# Patient Record
Sex: MALE | Race: WHITE | Employment: OTHER | URBAN - METROPOLITAN AREA
[De-identification: names, ages, dates, MRNs, and addresses within clinical notes are randomized per-mention and may not be internally consistent; named-entity substitution may affect disease eponyms.]

---

## 2017-01-24 ENCOUNTER — CARE COORDINATION (OUTPATIENT)
Dept: GERIATRIC MEDICINE | Facility: CLINIC | Age: 77
End: 2017-01-24

## 2017-01-24 NOTE — PROGRESS NOTES
1/24/2017  CM received notification from member's wife, aMttie, that she would like respite stay from Mid May until Mid June 2017.  Mattie stated she would like to arrange care at [facility] this year as she felt member received excellent care there.    CM informed Mattie that CM will contact [facility] this spring to arrange.    Jossy Cortes RN  [facility]   [phone]

## 2017-01-31 DIAGNOSIS — Z79.4 TYPE 2 DIABETES MELLITUS WITH DIABETIC NEUROPATHY, WITH LONG-TERM CURRENT USE OF INSULIN (H): Primary | ICD-10-CM

## 2017-01-31 DIAGNOSIS — E11.40 TYPE 2 DIABETES MELLITUS WITH DIABETIC NEUROPATHY, WITH LONG-TERM CURRENT USE OF INSULIN (H): Primary | ICD-10-CM

## 2017-01-31 NOTE — TELEPHONE ENCOUNTER
Recent Labs   Lab Test  03/23/16   0837  10/13/14   1335   08/09/12   0619  02/26/12   0608   CHOL  149   --    --   162  183   HDL  43   --    --   33*  39*   LDL  68  112   < >  90  111   TRIG  188*   --    --   195*  170*   CHOLHDLRATIO   --    --    --   5.0  5.0    < > = values in this interval not displayed.     AST        9   10/26/2016  ALT       16   10/26/2016  A1C      5.9   10/26/2016  A1C      6.5   3/23/2016  A1C      6.2   4/16/2015  A1C      6.2   10/13/2014  A1C      6.2   4/10/2014  CREATININE   Date Value Ref Range Status   10/26/2016 0.74 0.66 - 1.25 mg/dL Final   ]  POTASSIUM   Date Value Ref Range Status   10/26/2016 4.2 3.4 - 5.3 mmol/L Final   ]  TSH   Date Value Ref Range Status   10/26/2016 1.70 0.40 - 4.00 mU/L Final   10/13/2014 1.18 0.40 - 4.00 mU/L Final     Comment:     Effective 7/30/2014, the reference range for this assay has changed to reflect   new instrumentation/methodology.     09/24/2013 1.21 0.4 - 5.0 mU/L Final   08/29/2012 1.25 0.4 - 5.0 mU/L Final     BP Readings from Last 4 Encounters:   10/26/16 120/66   08/02/16 113/70   03/23/16 118/62   07/31/15 105/56       Last PHQ-9 score on record= 15

## 2017-02-08 ENCOUNTER — TELEPHONE (OUTPATIENT)
Dept: CARE COORDINATION | Facility: CLINIC | Age: 77
End: 2017-02-08

## 2017-02-08 NOTE — TELEPHONE ENCOUNTER
Ok to continue SN every other week for community wellness, assessments and education     Bettie Cabrera RN  [facility]   [phone]    [facility] now requests orders and shares plan of care/discharge summaries for some patients through [employer].  Please REPLY TO THIS MESSAGE in order to give authorization for orders when needed.  This is considered a verbal order, you will still receive a faxed copy of orders for signature.  Thank you for your assistance in improving collaboration for our patients.

## 2017-02-15 DIAGNOSIS — K59.00 CONSTIPATION, UNSPECIFIED CONSTIPATION TYPE: ICD-10-CM

## 2017-02-15 DIAGNOSIS — Z79.4 TYPE 2 DIABETES MELLITUS WITH COMPLICATION, WITH LONG-TERM CURRENT USE OF INSULIN (H): Primary | ICD-10-CM

## 2017-02-15 DIAGNOSIS — E11.8 TYPE 2 DIABETES MELLITUS WITH COMPLICATION, WITH LONG-TERM CURRENT USE OF INSULIN (H): Primary | ICD-10-CM

## 2017-02-17 NOTE — TELEPHONE ENCOUNTER
Metformin  Routing refill request to provider for review/approval because:  Labs out of range:  Microalbumin  A break in medication, patient should have been out of medication 8 months ago    Colace  Routing refill request to provider for review/approval because:  A break in medication, patient should have been out of medication in December    Maty Reyna RN  [facility]

## 2017-02-17 NOTE — TELEPHONE ENCOUNTER
Metformin         Last Written Prescription Date: 06/03/15  Last Fill Quantity: 60, # refills: 11  Last Office Visit with [facility], [facility] or  Health prescribing provider:  10/26/16        BP Readings from Last 3 Encounters:   10/26/16 120/66   08/02/16 113/70   03/23/16 118/62     Lab Results   Component Value Date    MICROL 60 03/23/2016     Lab Results   Component Value Date    MICROALBUMIN  10/28/2002      Comment:      SEE SCAN     Creatinine   Date Value Ref Range Status   10/26/2016 0.74 0.66 - 1.25 mg/dL Final   ]  GFR Estimate   Date Value Ref Range Status   10/26/2016 >90  Non  GFR Calc   >60 mL/min/1.7m2 Final   03/23/2016 >90  Non  GFR Calc   >60 mL/min/1.7m2 Final   07/31/2015 >90  Non  GFR Calc   >60 mL/min/1.7m2 Final     GFR Estimate If Black   Date Value Ref Range Status   10/26/2016 >90   GFR Calc   >60 mL/min/1.7m2 Final   03/23/2016 >90   GFR Calc   >60 mL/min/1.7m2 Final   07/31/2015 >90   GFR Calc   >60 mL/min/1.7m2 Final     Lab Results   Component Value Date    CHOL 149 03/23/2016     Lab Results   Component Value Date    HDL 43 03/23/2016     Lab Results   Component Value Date    LDL 68 03/23/2016     10/13/2014     Lab Results   Component Value Date    TRIG 188 03/23/2016     Lab Results   Component Value Date    CHOLHDLRATIO 5.0 08/09/2012     Lab Results   Component Value Date    AST 9 10/26/2016     Lab Results   Component Value Date    ALT 16 10/26/2016     Lab Results   Component Value Date    A1C 5.9 10/26/2016    A1C 6.5 03/23/2016    A1C 6.2 04/16/2015    A1C 6.2 10/13/2014    A1C 6.2 04/10/2014     Potassium   Date Value Ref Range Status   10/26/2016 4.2 3.4 - 5.3 mmol/L Final     colace      Last Written Prescription Date: 10/26/16  Last Fill Quantity: 60,  # refills: 1   Last Office Visit with [facility], [facility] or  Health prescribing provider: 10/26/16

## 2017-02-20 RX ORDER — ASPIRIN 81 MG
100 TABLET, DELAYED RELEASE (ENTERIC COATED) ORAL DAILY
Qty: 60 TABLET | Refills: 3 | Status: ON HOLD | OUTPATIENT
Start: 2017-02-20 | End: 2019-01-01

## 2017-02-28 DIAGNOSIS — E11.8 TYPE 2 DIABETES MELLITUS WITH COMPLICATION, WITH LONG-TERM CURRENT USE OF INSULIN (H): Primary | ICD-10-CM

## 2017-02-28 DIAGNOSIS — E11.40 TYPE 2 DIABETES MELLITUS WITH DIABETIC NEUROPATHY, WITH LONG-TERM CURRENT USE OF INSULIN (H): ICD-10-CM

## 2017-02-28 DIAGNOSIS — Z79.4 TYPE 2 DIABETES MELLITUS WITH COMPLICATION, WITH LONG-TERM CURRENT USE OF INSULIN (H): Primary | ICD-10-CM

## 2017-02-28 DIAGNOSIS — Z79.4 TYPE 2 DIABETES MELLITUS WITH DIABETIC NEUROPATHY, WITH LONG-TERM CURRENT USE OF INSULIN (H): ICD-10-CM

## 2017-02-28 RX ORDER — INSULIN GLARGINE 100 [IU]/ML
INJECTION, SOLUTION SUBCUTANEOUS
Qty: 45 ML | Refills: 1 | Status: SHIPPED | OUTPATIENT
Start: 2017-02-28 | End: 2018-01-19

## 2017-02-28 NOTE — TELEPHONE ENCOUNTER
TAE  UNIT/ML        Last Written Prescription Date: 10/26/16  Last Fill Quantity: 3 month, # refills: 1  Last Office Visit with [facility], P or [facility] prescribing provider:  10/26/16        BP Readings from Last 3 Encounters:   10/26/16 120/66   08/02/16 113/70   03/23/16 118/62     Lab Results   Component Value Date    MICROL 60 03/23/2016     Lab Results   Component Value Date    MICROALBUMIN  10/28/2002      Comment:      SEE SCAN     Creatinine   Date Value Ref Range Status   10/26/2016 0.74 0.66 - 1.25 mg/dL Final   ]  GFR Estimate   Date Value Ref Range Status   10/26/2016 >90  Non  GFR Calc   >60 mL/min/1.7m2 Final   03/23/2016 >90  Non  GFR Calc   >60 mL/min/1.7m2 Final   07/31/2015 >90  Non  GFR Calc   >60 mL/min/1.7m2 Final     GFR Estimate If Black   Date Value Ref Range Status   10/26/2016 >90   GFR Calc   >60 mL/min/1.7m2 Final   03/23/2016 >90   GFR Calc   >60 mL/min/1.7m2 Final   07/31/2015 >90   GFR Calc   >60 mL/min/1.7m2 Final     Lab Results   Component Value Date    CHOL 149 03/23/2016     Lab Results   Component Value Date    HDL 43 03/23/2016     Lab Results   Component Value Date    LDL 68 03/23/2016     10/13/2014     Lab Results   Component Value Date    TRIG 188 03/23/2016     Lab Results   Component Value Date    CHOLHDLRATIO 5.0 08/09/2012     Lab Results   Component Value Date    AST 9 10/26/2016     Lab Results   Component Value Date    ALT 16 10/26/2016     Lab Results   Component Value Date    A1C 5.9 10/26/2016    A1C 6.5 03/23/2016    A1C 6.2 04/16/2015    A1C 6.2 10/13/2014    A1C 6.2 04/10/2014     Potassium   Date Value Ref Range Status   10/26/2016 4.2 3.4 - 5.3 mmol/L Final         Recent Labs   Lab Test  03/23/16   0837  10/13/14   1335   08/09/12   0619  02/26/12   0608   CHOL  149   --    --   162  183   HDL  43   --    --   33*  39*   LDL  68  112   < >  90   111   TRIG  188*   --    --   195*  170*   CHOLHDLRATIO   --    --    --   5.0  5.0    < > = values in this interval not displayed.     Lab Results   Component Value Date    AST 9 10/26/2016     Lab Results   Component Value Date    ALT 16 10/26/2016     Lab Results   Component Value Date    A1C 5.9 10/26/2016    A1C 6.5 03/23/2016    A1C 6.2 04/16/2015    A1C 6.2 10/13/2014    A1C 6.2 04/10/2014     Creatinine   Date Value Ref Range Status   10/26/2016 0.74 0.66 - 1.25 mg/dL Final   ]  Potassium   Date Value Ref Range Status   10/26/2016 4.2 3.4 - 5.3 mmol/L Final   ]  TSH   Date Value Ref Range Status   10/26/2016 1.70 0.40 - 4.00 mU/L Final   10/13/2014 1.18 0.40 - 4.00 mU/L Final     Comment:     Effective 7/30/2014, the reference range for this assay has changed to reflect   new instrumentation/methodology.     09/24/2013 1.21 0.4 - 5.0 mU/L Final   08/29/2012 1.25 0.4 - 5.0 mU/L Final     BP Readings from Last 4 Encounters:   10/26/16 120/66   08/02/16 113/70   03/23/16 118/62   07/31/15 105/56       Last PHQ-9 score on record= 15

## 2017-03-17 DIAGNOSIS — I10 HTN, GOAL BELOW 140/90: ICD-10-CM

## 2017-03-20 RX ORDER — FUROSEMIDE 20 MG
TABLET ORAL
Qty: 30 TABLET | Refills: 5 | Status: SHIPPED | OUTPATIENT
Start: 2017-03-20 | End: 2017-10-09

## 2017-03-20 RX ORDER — LISINOPRIL 20 MG/1
TABLET ORAL
Qty: 30 TABLET | Refills: 5 | Status: SHIPPED | OUTPATIENT
Start: 2017-03-20 | End: 2017-10-09

## 2017-03-20 NOTE — TELEPHONE ENCOUNTER
Lasix      Last Written Prescription Date: 3/23/16  Last Fill Quantity: 30, # refills: 11  Last Office Visit with [facility], [facility] or [facility] prescribing provider: 10/26/16       Potassium   Date Value Ref Range Status   10/26/2016 4.2 3.4 - 5.3 mmol/L Final     Creatinine   Date Value Ref Range Status   10/26/2016 0.74 0.66 - 1.25 mg/dL Final     BP Readings from Last 3 Encounters:   10/26/16 120/66   08/02/16 113/70   03/23/16 118/62     Lisinopril      Last Written Prescription Date: 3/23/16  Last Fill Quantity: 30, # refills: 11  Last Office Visit with [facility], [facility] or [facility] prescribing provider: 10/26/16       Potassium   Date Value Ref Range Status   10/26/2016 4.2 3.4 - 5.3 mmol/L Final     Creatinine   Date Value Ref Range Status   10/26/2016 0.74 0.66 - 1.25 mg/dL Final     BP Readings from Last 3 Encounters:   10/26/16 120/66   08/02/16 113/70   03/23/16 118/62

## 2017-03-20 NOTE — TELEPHONE ENCOUNTER
Prescription approved per [facility] Refill Protocol.    Maty Reyna RN  [facility]

## 2017-04-10 ENCOUNTER — TELEPHONE (OUTPATIENT)
Dept: CARE COORDINATION | Facility: CLINIC | Age: 77
End: 2017-04-10

## 2017-04-10 NOTE — TELEPHONE ENCOUNTER
Ok to continue SN every 2 weeks for skilled assesement and education     Bettie Cabrera RN  [facility]   [phone]    [facility] now requests orders and shares plan of care/discharge summaries for some patients through [employer].  Please REPLY TO THIS MESSAGE in order to give authorization for orders when needed.  This is considered a verbal order, you will still receive a faxed copy of orders for signature.  Thank you for your assistance in improving collaboration for our patients.

## 2017-04-16 DIAGNOSIS — M79.662 PAIN OF LEFT LOWER LEG: ICD-10-CM

## 2017-04-16 DIAGNOSIS — E78.5 HYPERLIPIDEMIA LDL GOAL <100: ICD-10-CM

## 2017-04-17 NOTE — TELEPHONE ENCOUNTER
Gabapentin      Last Written Prescription Date:  12/30/16  Last Fill Quantity: 90,   # refills: 0  Last Office Visit with [facility], [facility] or [facility] prescribing provider: 10/26/16  Future Office visit:       Routing refill request to provider for review/approval because:  Drug not on the [facility], [facility] or [facility] refill protocol or controlled substance    Maty Reyna RN  [facility]

## 2017-04-17 NOTE — TELEPHONE ENCOUNTER
Simvastatin     Last Written Prescription Date: 6/3/15  Last Fill Quantity: 30, # refills: 11  Last Office Visit with [facility], P or [facility] prescribing provider: 10/26/16       Lab Results   Component Value Date    CHOL 149 03/23/2016     Lab Results   Component Value Date    HDL 43 03/23/2016     Lab Results   Component Value Date    LDL 68 03/23/2016     Lab Results   Component Value Date    TRIG 188 03/23/2016     Lab Results   Component Value Date    CHOLHDLRATIO 5.0 08/09/2012

## 2017-04-17 NOTE — TELEPHONE ENCOUNTER
Routing refill request to provider for review/approval because:  A break in medication, patient should have been out of medication 10 months ago    Maty Reyna RN  [facility]

## 2017-04-18 ENCOUNTER — CARE COORDINATION (OUTPATIENT)
Dept: GERIATRIC MEDICINE | Facility: CLINIC | Age: 77
End: 2017-04-18

## 2017-04-18 RX ORDER — GABAPENTIN 300 MG/1
CAPSULE ORAL
Qty: 90 CAPSULE | Refills: 0 | Status: SHIPPED | OUTPATIENT
Start: 2017-04-18 | End: 2017-09-09

## 2017-04-18 RX ORDER — SIMVASTATIN 20 MG
TABLET ORAL
Qty: 30 TABLET | Refills: 5 | Status: SHIPPED | OUTPATIENT
Start: 2017-04-18 | End: 2017-05-22

## 2017-04-18 NOTE — PROGRESS NOTES
4/18/2017  CM received a call from members spouse Mattie requesting preparation for members yearly respite stay.    CM confirmed that Mattie wishes to have member go to the [facility].  Mattie confirmed and stated she would like an admit date of May 36th, 27th or 28th.  Respite to be for the full month of June, Returning home the beginning of July.     CM left a VM message for Hanane in admissions at [facility]. [phone])  To review admission process and possibility of openings.  Awaiting return call.    Jossy Cortes RN  [facility]   [phone]       4/18/2017  JENNIFER received a call from Hanane in Admissions.  POC will be for Respite admission to occur on 5/26/2017.  Member will discharge home the first week of July. CM forwarded H&P and Demographics to Hanane today. (Fax: [phone])  (Email: [email or web address].)     Mattie notified of POC.  Mattie to schedule an OV with PCP the week of 5/22/2017.    No additional tasks to be done at this time.    Jossy Cortes RN  [facility]   [phone]

## 2017-05-16 ENCOUNTER — CARE COORDINATION (OUTPATIENT)
Dept: GERIATRIC MEDICINE | Facility: CLINIC | Age: 77
End: 2017-05-16

## 2017-05-16 NOTE — PROGRESS NOTES
5/16/2017  JENNIFER spoke with Hanane Fields and Mattie, members spouse.  Respite stay will begin on Thursday 5/25/2017 with a drop off of 1p.m.    Jossy Cortes RN-[facility]   [phone]

## 2017-05-22 ENCOUNTER — OFFICE VISIT (OUTPATIENT)
Dept: FAMILY MEDICINE | Facility: OTHER | Age: 77
End: 2017-05-22
Payer: COMMERCIAL

## 2017-05-22 VITALS
WEIGHT: 276 LBS | DIASTOLIC BLOOD PRESSURE: 65 MMHG | HEIGHT: 69 IN | RESPIRATION RATE: 24 BRPM | TEMPERATURE: 97 F | HEART RATE: 74 BPM | BODY MASS INDEX: 40.88 KG/M2 | SYSTOLIC BLOOD PRESSURE: 112 MMHG | OXYGEN SATURATION: 95 %

## 2017-05-22 DIAGNOSIS — I10 ESSENTIAL HYPERTENSION WITH GOAL BLOOD PRESSURE LESS THAN 140/90: ICD-10-CM

## 2017-05-22 DIAGNOSIS — F33.0 MAJOR DEPRESSIVE DISORDER, RECURRENT EPISODE, MILD (H): ICD-10-CM

## 2017-05-22 DIAGNOSIS — Z79.4 TYPE 2 DIABETES MELLITUS WITH DIABETIC NEUROPATHY, WITH LONG-TERM CURRENT USE OF INSULIN (H): Primary | ICD-10-CM

## 2017-05-22 DIAGNOSIS — E78.5 HYPERLIPIDEMIA LDL GOAL <100: ICD-10-CM

## 2017-05-22 DIAGNOSIS — E11.40 TYPE 2 DIABETES MELLITUS WITH DIABETIC NEUROPATHY, WITH LONG-TERM CURRENT USE OF INSULIN (H): Primary | ICD-10-CM

## 2017-05-22 DIAGNOSIS — B37.2 YEAST DERMATITIS: ICD-10-CM

## 2017-05-22 LAB
ALBUMIN SERPL-MCNC: 3.4 G/DL (ref 3.4–5)
ALP SERPL-CCNC: 110 U/L (ref 40–150)
ALT SERPL W P-5'-P-CCNC: 12 U/L (ref 0–70)
ANION GAP SERPL CALCULATED.3IONS-SCNC: 10 MMOL/L (ref 3–14)
AST SERPL W P-5'-P-CCNC: 12 U/L (ref 0–45)
BILIRUB SERPL-MCNC: 0.3 MG/DL (ref 0.2–1.3)
BUN SERPL-MCNC: 26 MG/DL (ref 7–30)
CALCIUM SERPL-MCNC: 9.3 MG/DL (ref 8.5–10.1)
CHLORIDE SERPL-SCNC: 105 MMOL/L (ref 94–109)
CHOLEST SERPL-MCNC: 179 MG/DL
CO2 SERPL-SCNC: 28 MMOL/L (ref 20–32)
CREAT SERPL-MCNC: 0.77 MG/DL (ref 0.66–1.25)
GFR SERPL CREATININE-BSD FRML MDRD: ABNORMAL ML/MIN/1.7M2
GLUCOSE SERPL-MCNC: 119 MG/DL (ref 70–99)
HBA1C MFR BLD: 6 % (ref 4.3–6)
HDLC SERPL-MCNC: 39 MG/DL
LDLC SERPL CALC-MCNC: 72 MG/DL
NONHDLC SERPL-MCNC: 140 MG/DL
POTASSIUM SERPL-SCNC: 4.7 MMOL/L (ref 3.4–5.3)
PROT SERPL-MCNC: 7.7 G/DL (ref 6.8–8.8)
SODIUM SERPL-SCNC: 143 MMOL/L (ref 133–144)
TRIGL SERPL-MCNC: 339 MG/DL
TSH SERPL DL<=0.05 MIU/L-ACNC: 1.48 MU/L (ref 0.4–4)

## 2017-05-22 PROCEDURE — 84443 ASSAY THYROID STIM HORMONE: CPT | Performed by: FAMILY MEDICINE

## 2017-05-22 PROCEDURE — 99213 OFFICE O/P EST LOW 20 MIN: CPT | Performed by: FAMILY MEDICINE

## 2017-05-22 PROCEDURE — 83036 HEMOGLOBIN GLYCOSYLATED A1C: CPT | Performed by: FAMILY MEDICINE

## 2017-05-22 PROCEDURE — 36415 COLL VENOUS BLD VENIPUNCTURE: CPT | Performed by: FAMILY MEDICINE

## 2017-05-22 PROCEDURE — 80053 COMPREHEN METABOLIC PANEL: CPT | Performed by: FAMILY MEDICINE

## 2017-05-22 PROCEDURE — 80061 LIPID PANEL: CPT | Performed by: FAMILY MEDICINE

## 2017-05-22 RX ORDER — MICONAZOLE NITRATE
POWDER (GRAM) MISCELLANEOUS DAILY PRN
Qty: 2 BOTTLE | Refills: 1 | Status: ON HOLD | OUTPATIENT
Start: 2017-05-22 | End: 2019-01-01

## 2017-05-22 ASSESSMENT — PAIN SCALES - GENERAL: PAINLEVEL: NO PAIN (0)

## 2017-05-22 NOTE — PROGRESS NOTES
"SUBJECTIVE:                                                    Derek Aragon is a 77 year old male who presents to clinic today for the following health issues:    Chief Complaint   Patient presents with     Consult     Nursing Home Placement on 5/25/17         Amount of exercise or physical activity: PT and OT    Problems taking medications regularly: No    Medication side effects: none    Diet: diabetic      Problem list and histories reviewed & adjusted, as indicated.    C: NEGATIVE for fever, chills, change in weight  E/M: NEGATIVE for ear, mouth and throat problems  R: NEGATIVE for significant cough or SOB  CV: NEGATIVE for chest pain, palpitations or peripheral edema  NEURO: hx CVA    OBJECTIVE:                                                    /65 (BP Location: Right arm, Patient Position: Chair, Cuff Size: Adult Large)  Pulse 74  Temp 97  F (36.1  C) (Temporal)  Resp 24  Ht 5' 9\" (1.753 m)  Wt 276 lb (125.2 kg)  SpO2 95%  BMI 40.76 kg/m2  Body mass index is 40.76 kg/(m^2).    See dictated note     ASSESSMENT/PLAN:                                                    Ok for admit to respite NH care    Salvador Duarte MD, MD  [facility]          "

## 2017-05-22 NOTE — MR AVS SNAPSHOT
After Visit Summary   5/22/2017    Derek Aragon    MRN: [id number]           Patient Information     Date Of Birth          1940        Visit Information        Provider Department      5/22/2017 1:30 PM Salvador Duarte MD [facility]        Today's Diagnoses     Type 2 diabetes mellitus with diabetic neuropathy, with long-term current use of insulin (H)    -  1    Yeast dermatitis        Essential hypertension with goal blood pressure less than 140/90        Major depressive disorder, recurrent episode, mild (H)        Hyperlipidemia LDL goal <100           Follow-ups after your visit        Who to contact     If you have questions or need follow up information about today's clinic visit or your schedule please contact [facility] directly at [phone].  Normal or non-critical lab and imaging results will be communicated to you by [employer], letter or phone within 4 business days after the clinic has received the results. If you do not hear from us within 7 days, please contact the clinic through [employer] or phone. If you have a critical or abnormal lab result, we will notify you by phone as soon as possible.  Submit refill requests through [employer] or call your pharmacy and they will forward the refill request to us. Please allow 3 business days for your refill to be completed.          Additional Information About Your Visit        MyChart Information     [employer] gives you secure access to your electronic health record. If you see a primary care provider, you can also send messages to your care team and make appointments. If you have questions, please call your primary care clinic.  If you do not have a primary care provider, please call [phone] and they will assist you.        Care EveryWhere ID     This is your Care EveryWhere ID. This could be used by other organizations to access your [place] medical records  [id number]        Your Vitals Were      "Pulse Temperature Respirations Height Pulse Oximetry BMI (Body Mass Index)    74 97  F (36.1  C) (Temporal) 24 5' 9\" (1.753 m) 95% 40.76 kg/m2       Blood Pressure from Last 3 Encounters:   05/22/17 112/65   10/26/16 120/66   08/02/16 113/70    Weight from Last 3 Encounters:   05/22/17 276 lb (125.2 kg)   10/26/16 287 lb (130.2 kg)   08/02/16 287 lb (130.2 kg)              We Performed the Following     C FOOT EXAM  NO CHARGE     Comprehensive metabolic panel     DEPRESSION ACTION PLAN (DAP)     EYE EXAM (SIMPLE-NONBILLABLE)     Hemoglobin A1c     Lipid panel reflex to direct LDL     TSH          Today's Medication Changes          These changes are accurate as of: 5/22/17  2:50 PM.  If you have any questions, ask your nurse or doctor.               Start taking these medicines.        Dose/Directions    Miconazole Nitrate Powd powder   Used for:  Yeast dermatitis   Started by:  Salvador Duarte MD        Apply topically daily as needed   Quantity:  2 Bottle   Refills:  1            Where to get your medicines      These medications were sent to [id number] - [place], [place] - [address] [place] [address]  [address] [place] [address]    Hours:  M-F 8:30-6:30; Sat 9-4; closed Sunday Phone:  [phone]     Miconazole Nitrate Powd powder                Primary Care Provider Office Phone # Fax #    Salvador Duarte [phone]       [facility] [address] [facility] [address]        Thank you!     Thank you for choosing [facility]  for your care. Our goal is always to provide you with excellent care. Hearing back from our patients is one way we can continue to improve our services. Please take a few minutes to complete the written survey that you may receive in the mail after your visit with us. Thank you!             Your Updated Medication List - Protect others around you: Learn how to safely use, store and throw away your medicines at www.disposemymeds.org. "          This list is accurate as of: 5/22/17  2:50 PM.  Always use your most recent med list.                   Brand Name Dispense Instructions for use    acyclovir 5 % cream    ZOVIRAX    5 g    Apply topically 5 times daily       albuterol (2.5 MG/3ML) 0.083% neb solution     90 mL    NEBULIZE 1 VIAL BY MOUTH EV NANCY 6 HOURS AS NEEDED FOR W HEEZING / SHORTNESS OF DREW TH /DYSPNEA       ALLERGY RELIEF 10 MG tablet   Generic drug:  loratadine     30 tablet    Take 1 tablet (10 mg) by mouth daily as needed for allergies BRAND NAME IS EQUATE       aspirin 325 MG EC tablet     100 tablet    Take 1 tablet (325 mg) by mouth daily       blood glucose monitoring lancets     3 Box    Use to test blood sugar 4 times daily or as directed.       blood glucose monitoring test strip    no brand specified    100 each    Test 4 times daily.       docusate sodium 100 MG tablet    COLACE    60 tablet    Take 100 mg by mouth daily       DULoxetine 30 MG EC capsule    CYMBALTA    180 capsule    Take 1 capsule (30 mg) by mouth 2 times daily       furosemide 20 MG tablet    LASIX    30 tablet    TAKE 1 TABLET BY MOUTH EVER Y DAY       gabapentin 300 MG capsule    NEURONTIN    90 capsule    TAKE 1 CAPSULE BY MOUTH RHONDA LY AT BEDTIME       hydrochlorothiazide 25 MG tablet    HYDRODIURIL    90 tablet    TAKE 1 TABLET BY MOUTH EVER Y DAY       insulin pen needle 30G X 8 MM    NOVOFINE    100 each    1 Device 2 times daily       LANTUS SOLOSTAR 100 UNIT/ML injection   Generic drug:  insulin glargine     45 mL    15 units every morning and 25 units every HS       lidocaine 5 % Patch    LIDODERM    30 patch    Place 1 patch onto the skin daily as needed To back PRN       lisinopril 20 MG tablet    PRINIVIL/ZESTRIL    30 tablet    TAKE 1 TABLET BY MOUTH EVER Y DAY       metFORMIN 1000 MG tablet    GLUCOPHAGE    60 tablet    TAKE 1 TABLET BY MOUTH TWIC E A DAY WITH MEALS       Miconazole Nitrate Powd powder     2 Bottle    Apply topically daily  as needed       NYSTOP 304947 UNIT/GM Powd   Generic drug:  nystatin     30 g    APPLY TO BUTTOCKS; GROIN; AND PERINEUM 3 TIMES A DAY AS NEEDED.       order for DME      Equipment being ordered: CPAP at previous home settings       rOPINIRole 0.25 MG tablet    REQUIP    90 tablet    Take 1 tablet (0.25 mg) by mouth At Bedtime       simvastatin 20 MG tablet    ZOCOR    30 tablet    TAKE 1 TABLET BY MOUTH EVER Y EVENING

## 2017-05-23 ENCOUNTER — TELEPHONE (OUTPATIENT)
Dept: FAMILY MEDICINE | Facility: OTHER | Age: 77
End: 2017-05-23

## 2017-05-23 ASSESSMENT — PATIENT HEALTH QUESTIONNAIRE - PHQ9: SUM OF ALL RESPONSES TO PHQ QUESTIONS 1-9: 9

## 2017-05-23 NOTE — PROGRESS NOTES
SUBJECTIVE:  Mr. Derek Aragon is a 77-year-old man in accompanied by his wife.      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  He continues to be obese but has had some weight loss recently.  He is in today because he is to be admitted to [facility] for respite for the next month or so as his wife has company coming and would not be able to care for him full-time.  He is really in for clearance exam.  The nursing home has requested a history and physical and sent a paper form.  I do fill that out, scanned it into the chart and faxed it.  He really has not had any change in his situation.  His wife is requesting  occupational therapy and physical therapy and does mention that he will need a 2-person lift to be managed and I put in orders for that on paper.      OBJECTIVE:     GENERAL:  Obese man in a wheelchair.   HEART:  Regular.    LUNGS:  Lungs sound clear.   ABDOMEN:  Obese but soft.     EXTREMITIES:  His left arm is in a sling.  He is weak in both the arm and leg.      MEDICATIONS:  List is included for the nursing home.         SAULO MEJIA M.D.             D: 2017 18:04   T: 2017 09:52   MT: TOLU#[id number]      Name:     DEREK ARAGON   MRN:      [id number]        Account:      [id number]   :      1940           Visit Date:   2017      Document: [id number]

## 2017-05-23 NOTE — TELEPHONE ENCOUNTER
Hanane from admissions at [facility].  Derek will be there for respite stay on Thursday.  They need you to fax the copy of the [place] script.  Fax [phone]  And Phone:  [phone]

## 2017-06-16 ENCOUNTER — CARE COORDINATION (OUTPATIENT)
Dept: GERIATRIC MEDICINE | Facility: CLINIC | Age: 77
End: 2017-06-16

## 2017-06-16 NOTE — PROGRESS NOTES
6/16/2017  CM received a VM message from members spouse Mattie.  Mattie stated that she would be picking up member early from [facility] on June 25th.  CM updated Lakehsia S. Of upcoming d/c date.      Jossy Cortes RN-[facility]   [phone]

## 2017-06-26 ENCOUNTER — CARE COORDINATION (OUTPATIENT)
Dept: GERIATRIC MEDICINE | Facility: CLINIC | Age: 77
End: 2017-06-26

## 2017-06-26 NOTE — PROGRESS NOTES
6/26/2017  Yearly LTCC visit scheduled for July 6th @ [facility]..    Jossy Cortes RN-[facility]   [phone]

## 2017-07-03 DIAGNOSIS — K59.00 CONSTIPATION, UNSPECIFIED CONSTIPATION TYPE: ICD-10-CM

## 2017-07-03 RX ORDER — SENNA AND DOCUSATE SODIUM 50; 8.6 MG/1; MG/1
TABLET, FILM COATED ORAL
Qty: 90 TABLET | Refills: 1 | Status: SHIPPED | OUTPATIENT
Start: 2017-07-03 | End: 2017-12-08

## 2017-07-03 NOTE — TELEPHONE ENCOUNTER
Routing refill request to provider for review/approval because:  Drug not active on patient's medication list    Maty Reyna RN  [facility]

## 2017-07-03 NOTE — TELEPHONE ENCOUNTER
Sennosides-Docusate      Last Written Prescription Date: NA  Last Fill Quantity: NA,  # refills: NA   Last Office Visit with [facility], [facility] or [facility] prescribing provider: 5/22/17

## 2017-07-06 ENCOUNTER — CARE COORDINATION (OUTPATIENT)
Dept: GERIATRIC MEDICINE | Facility: CLINIC | Age: 77
End: 2017-07-06

## 2017-07-07 ENCOUNTER — CARE COORDINATION (OUTPATIENT)
Dept: GERIATRIC MEDICINE | Facility: CLINIC | Age: 77
End: 2017-07-07

## 2017-07-07 ENCOUNTER — TRANSFERRED RECORDS (OUTPATIENT)
Dept: HEALTH INFORMATION MANAGEMENT | Facility: CLINIC | Age: 77
End: 2017-07-07

## 2017-07-10 ENCOUNTER — CARE COORDINATION (OUTPATIENT)
Dept: GERIATRIC MEDICINE | Facility: CLINIC | Age: 77
End: 2017-07-10

## 2017-07-10 NOTE — PROGRESS NOTES
Home visit/Colin Risk Assessment/EW screening completed on: 7/6/2017  Member resides: in a private home with his spouse.   Member currently receiving the following services: Case management, MA Transportation, Home Delivered Meals, 10.5 hrs of PCA Services and Supervision, Lifeline, Incontinent Supplies and SNV 2x Monthly  See EMR for a list of client's diagnoses and medications.   Medication management: Medications reviewed:Yes. Medication management: Care giver administers medication. Medication understanding:Caregiver has understanding of regimen and is able to complete med set up/administration Yes. MTM offered. Declined at this time.   Member Mood/behavior-PHQ9 score: 0/2  Any needs to f/u with PCP on PHQ9 score. No  Plan of Care: All services above will continue except Home Del Meals as spouse states member does not care for them.  PCA hours will increase to 11.5 hours. (Agency Notified of increase.)  Members spouse would also like an equipment review / safety visit to ensure ted lift and bed are working properly.   DTR completed for Home Del Meals and forwarded to Lakeshia KEARNS  CM reviewed Respite stay for 2018 and spouse verbalized understanding that it needs to be scheduled for less than 30 days for each stay.    Follow-Up Plan: Member informed of future contact, plan to f/u with member with a 6 month telephone assessment.  Contact information shared with member and family, encouraged member to call with any questions or concerns prior to this.  See [facility] for further detailed information    Jossy Cortes RN-[facility]   [phone]      7/11/2017  CM received the following notification:    Hello,     The DTR review team has reviewed your request for the member Derek Aragon.    Decision: The reviewer agrees with CC recommendation to terminate services.  Item/Service: Home Delivered Meals.  Amount:   Start date of termination: 07/21/17.      For the current and most up to date forms  please visit the Care Coordinator website at: [email or web address].    A letter will be sent to the member, provider/agency and MD and an authorization/denial has been put into the system so there is no need to send in a referral request form.    Thanks so much!  Sheila Beard, [facility]     No additional f/u needed.    Jossy Cortes RN-[facility]   [phone]     7/11/2017  CM notified [facility] of maintenance call that is needed for ted lift and hospital bed. Awaiting response from [facility] on cost for this service.    Jossy Cortes RN-[facility]   [phone]

## 2017-07-10 NOTE — PROGRESS NOTES
Received a request to submit a DTR for the termination of delivered meals. Documentation completed and faxed to the health plan.  aware.    Lakeshia Winkler RN  Utilization   [facility]  [phone]

## 2017-07-10 NOTE — PROGRESS NOTES
7/6/2017  New Episode to be created for continuation of charting.    Jossy Cortes RN-[facility]   [phone]

## 2017-07-12 ENCOUNTER — CARE COORDINATION (OUTPATIENT)
Dept: GERIATRIC MEDICINE | Facility: CLINIC | Age: 77
End: 2017-07-12

## 2017-07-12 NOTE — PROGRESS NOTES
7/12/2017   received notification from Jhonatan @ [facility] of the following:    Jossy,              We would charge 4 hours of labor.  So the cost would be $224.00.  Let me know if you are OK with this and I ll get order entered.  Thanks,  --Jhonatan    CPS updated with cost and submitted to CMS.      Members wife notified that [facility] will be contacting them.     Jossy Cortes RN-[facility]   [phone]

## 2017-07-13 ENCOUNTER — CARE COORDINATION (OUTPATIENT)
Dept: GERIATRIC MEDICINE | Facility: CLINIC | Age: 77
End: 2017-07-13

## 2017-07-13 NOTE — PROGRESS NOTES
Received after visit chart from care coordinator.  Completed following tasks: Mailed copy of care plan to client  Updated services in access  Submitted referrals/auths for ted lift and electric bed maintenance visit  Entered MMIS  Chart was returned to CC.     [employer]:  Faxed completed PCA assessment and authorization letter to PCA Agency and mailed copies to member.  Faxed authorization letter and MD Communication to PCP.  Emailed referral form for auth to Nathan.      Danelle Gant  Case Management Specialist   [facility] [place]   [phone]

## 2017-07-20 ENCOUNTER — TELEPHONE (OUTPATIENT)
Dept: FAMILY MEDICINE | Facility: OTHER | Age: 77
End: 2017-07-20

## 2017-07-20 ENCOUNTER — CARE COORDINATION (OUTPATIENT)
Dept: GERIATRIC MEDICINE | Facility: CLINIC | Age: 77
End: 2017-07-20

## 2017-07-20 NOTE — TELEPHONE ENCOUNTER
Reason for Call: Request for an order or referral:    Order or referral being requested: new episode of home care to be opened on 7/25/17    Date needed: as soon as possible    Has the patient been seen by the PCP for this problem? YES    Additional comments: ok to leave verbal on her secure cell number    Phone number Patient can be reached at:  Home number on file [phone] (home)    Best Time:  any    Can we leave a detailed message on this number?  YES, Mattie Santiago [phone]    Call taken on 7/20/2017 at 10:28 AM by So Jackman

## 2017-07-20 NOTE — PROGRESS NOTES
7/20/17: Covering CM received notification from [facility] that member's wife, Mattie, had dental coverage questions.   Covering CM placed call to wife, Mattie - she states member sees Dr. Alfonso in [place] and is in process of being fitted for bottom dentures.  Mattie states that if the bottom dentures do not fit properly then Dr. Alfonso mentioned that member would need mini implants. Mattie states they are still about 4 weeks out of knowing for sure but just wanted to find out her options.  Mattie was inquiring on coverage of mini implants.   Explained to her that Dr. Alfonso should be sending in pre treatment auth to [employer] and that he most likely is aware of this.   Implants are not a covered benefit.   Mattie had further questions.  JENNIFER and Mattie made conference call to [facility] - spoke with Kaylah and she verified for Mattie that dental implants are not a covered benefit and no exceptions.       Mattie verbalized understanding and asked if PCA hours could be reduced to use the money to pay for dental implants if needed - JENNIFER explained that this is not an option.  She verbalized understanding.       Mattie asked that JENNIFER Fenton, would f/u further with Mattie re: this.   Covering CM will inform Jossy.     Raina Hawkins RN, PHN  [employer] [facility]

## 2017-07-25 ENCOUNTER — TELEPHONE (OUTPATIENT)
Dept: CARE COORDINATION | Facility: CLINIC | Age: 77
End: 2017-07-25

## 2017-07-25 NOTE — TELEPHONE ENCOUNTER
Ok for SN  Every 2 weeks for cp assess, disease management education     Bettie Cabrera RN  [facility]   [phone]    [facility] utilizes an encounter to take the place of a direct phone call to your office. Please take a moment to review the below request. Your reply to this encounter will act as a verbal OK of orders if requested below. Thank you.

## 2017-08-02 ENCOUNTER — CARE COORDINATION (OUTPATIENT)
Dept: GERIATRIC MEDICINE | Facility: CLINIC | Age: 77
End: 2017-08-02

## 2017-08-02 NOTE — PROGRESS NOTES
8/2/2017  CM received notification from Sena DUPREE, Supervisor, that members spouse had concerns with the restart of SNV from [facility] following his Respite stay in June. Mattie, member's spouse, had reported that no visit was made until she contacted [facility]. Mattie was informed that the Home Care team was not aware that member had returned home from his respite stay. First SNV visit completed was during the week of July 24th.  CM believed she had contacted  Home Care to restart services, CM apologized for the oversight if the call had not been made or if the information was not relayed to the Home Care staff.    Mattie also verbalized that PCA staff were not aware of his return.  CM verbalized concern that I do not schedule the PCA as Mattie has always had direct communication with them as she chooses their schedule and when she wants coverage. CM informed Mattie that I had assumed that she had arranged for PCA coverage prior to his return home since he has PCA daily and that schedule would need to have been arranged or Mattie would not have had any PCA assistance on his 1st day home. CM verbalized apology for the confusion.    CM questioned if [facility] Maintenance visit had been scheduled / completed. Mattie stated she had not received a call from anyone regarding this service. Mattie stated she was not aware of who would be coming out as she had never been updated. CM reviewed that CM had contacted Mattie on 7/12/2017:  See note below       CM received notification from Jhonatan @ [facility] of the following:     Jossy,              We would charge 4 hours of labor.  So the cost would be $224.00.  Let me know if you are OK with this and I ll get order entered.  Thanks,  --[facility]     CPS updated with cost and submitted to CMS.       Members wife notified that [facility] will be contacting them.      Jossy Cortes RN-[facility]   [phone]    Mattie stated she had contacted the [employer] that the items were  delivered by since she was not updated on the POC.   CM encouraged Mattie to look at the Care Plan that was mailed by CMS on 7/13/2017 by CMS. As it had the name / number of provider.  Mattie denied receipt of the Care Plan summary and stated she has not received anything in the mail.  CM printed another copy and placed in 1st class mail today.    JENNIFER contacted [facility] and requested he contact Mattie as soon as possible to schedule the mainenance visit.  [facility] stated it was on their agenda and he would inform individual that was making the visit to call and schedule a time. [facility] stated that it will not be until the 2nd week of August.     CM updated Mattie of the conversation held with [facility].   She will contact  if she does not receive a call.    No additional needs at this time.     Jossy Cortes RN-[facility]   [phone]

## 2017-08-10 DIAGNOSIS — I10 ESSENTIAL HYPERTENSION WITH GOAL BLOOD PRESSURE LESS THAN 140/90: ICD-10-CM

## 2017-08-10 DIAGNOSIS — E78.5 HYPERLIPIDEMIA LDL GOAL <100: ICD-10-CM

## 2017-08-10 DIAGNOSIS — F43.21 ADJUSTMENT DISORDER WITH DEPRESSED MOOD: ICD-10-CM

## 2017-08-10 DIAGNOSIS — E11.40 TYPE 2 DIABETES MELLITUS WITH DIABETIC NEUROPATHY (H): ICD-10-CM

## 2017-08-10 NOTE — TELEPHONE ENCOUNTER
HCTZ  Routing refill request to provider for review/approval because:  A break in medication, patient should have been out of medication >1 month ago    Cymbalta  Routing refill request to provider for review/approval because:  PHQ-9 >4    Maty Reyna RN  [facility]

## 2017-08-10 NOTE — TELEPHONE ENCOUNTER
Aspirin      Last Written Prescription Date: 8/10/16  Last Fill Quantity: 100,  # refills: 3   Last Office Visit with G, P or [facility] prescribing provider: 5/22/17

## 2017-08-10 NOTE — TELEPHONE ENCOUNTER
Hydrochlorothiazide,    Last Written Prescription Date: 12/30/16  Last Fill Quantity: 90, # refills: 1  Last Office Visit with [facility] or [facility] prescribing provider: 5/22/17       Potassium   Date Value Ref Range Status   05/22/2017 4.7 3.4 - 5.3 mmol/L Final     Creatinine   Date Value Ref Range Status   05/22/2017 0.77 0.66 - 1.25 mg/dL Final     BP Readings from Last 3 Encounters:   05/22/17 112/65   10/26/16 120/66   08/02/16 113/70       Cymbalta       Last Written Prescription Date: 12/12/16  Last Fill Quantity: 180, # refills: 1  Last Office Visit with [facility], [facility] or [facility] prescribing provider: 5/22/17        BP Readings from Last 3 Encounters:   05/22/17 112/65   10/26/16 120/66   08/02/16 113/70     Pulse: (for Fetzima)  Creatinine   Date Value Ref Range Status   05/22/2017 0.77 0.66 - 1.25 mg/dL Final   ]    Last PHQ-9 score on record=   PHQ-9 SCORE 5/22/2017   Total Score -   Total Score 9

## 2017-08-10 NOTE — TELEPHONE ENCOUNTER
Prescription approved per [facility] Refill Protocol.    Maty Reyna RN  [facility]

## 2017-08-11 RX ORDER — HYDROCHLOROTHIAZIDE 25 MG/1
TABLET ORAL
Qty: 90 TABLET | Refills: 0 | Status: SHIPPED | OUTPATIENT
Start: 2017-08-11 | End: 2017-11-08

## 2017-08-11 RX ORDER — DULOXETIN HYDROCHLORIDE 30 MG/1
CAPSULE, DELAYED RELEASE ORAL
Qty: 180 CAPSULE | Refills: 0 | Status: SHIPPED | OUTPATIENT
Start: 2017-08-11 | End: 2017-11-08

## 2017-08-16 DIAGNOSIS — Z79.4 TYPE 2 DIABETES MELLITUS WITH COMPLICATION, WITH LONG-TERM CURRENT USE OF INSULIN (H): Primary | ICD-10-CM

## 2017-08-16 DIAGNOSIS — E11.8 TYPE 2 DIABETES MELLITUS WITH COMPLICATION, WITH LONG-TERM CURRENT USE OF INSULIN (H): Primary | ICD-10-CM

## 2017-09-07 ENCOUNTER — CARE COORDINATION (OUTPATIENT)
Dept: GERIATRIC MEDICINE | Facility: CLINIC | Age: 77
End: 2017-09-07

## 2017-09-07 NOTE — PROGRESS NOTES
9/7/2017  JENNIFER received a call from Mattie stating she had received a call from Niyah @ [facility].  There were concerns noted with the his bi-pap machine and Mattie was informed it needed to be fixed.  Niyah was unsure how much would be covered by insurance.   has approx. 10 hours of banked PCA weekly.   also has a small amount unallocated in his budget.  informed Mattie that what is not covered by insurance can be covered under members EW.  Mattie will update Niyah on CM contact information so that she can provide information regarding any uncovered amount and an Auth can be submitted.    CPS and CP to be updated when information is received.     [facility]   [phone]       9/8/2017  JENNIFER received a call from Niyah stating that when they bill insurance for the repair, any left over amount will need to be billed to family. Niyah stated that she would have family update me when / if they receive a bill.    CM to work with Mattie and cover the remaining cost, if any, under members EW.    [facility]   [phone]         9/22/2017  JENNIFER received notification regarding repair of home DME from Jhonatan at [facility]:    Holger Fenton is indicating next week.  --[facility]   [phone]       10/9/2017  JENNIFER contacted Mattie marybeth spouse.  Mattie stated that [facility] did come out and fix the sit to stand lift. A new pump was placed on it and the brakes were fixed.    Mattie informed  that members bi-pap machine is being repaired at this time.  Member has a loaner machine from [facility].  Mattie stated that his should be done this week and she will go return the loaner and  his machine.    No additional concerns or needs at this time.    [facility]   [phone]

## 2017-09-09 DIAGNOSIS — M79.662 PAIN OF LEFT LOWER LEG: ICD-10-CM

## 2017-09-11 RX ORDER — GABAPENTIN 300 MG/1
CAPSULE ORAL
Qty: 90 CAPSULE | Refills: 0 | Status: SHIPPED | OUTPATIENT
Start: 2017-09-11 | End: 2018-01-19

## 2017-09-11 NOTE — TELEPHONE ENCOUNTER
Gabapentin      Last Written Prescription Date:  4/18/17  Last Fill Quantity: 90,   # refills: 0  Last Office Visit with [facility], P or  Health prescribing provider: 5/22/17  Future Office visit:       Routing refill request to provider for review/approval because:  Drug not on the [facility] or [facility] refill protocol or controlled substance

## 2017-09-19 ENCOUNTER — TELEPHONE (OUTPATIENT)
Dept: CARE COORDINATION | Facility: CLINIC | Age: 77
End: 2017-09-19

## 2017-09-19 NOTE — TELEPHONE ENCOUNTER
Ok to continue home care skilled nursing visits every other week for community wellness, disease management education    Bettie Cabrera RN  [facility]   [phone]    [facility] utilizes an encounter to take the place of a direct phone call to your office. Please take a moment to review the below request. Your reply to this encounter will act as a verbal OK of orders if requested below. Thank you.

## 2017-10-09 DIAGNOSIS — Z79.4 TYPE 2 DIABETES MELLITUS WITH COMPLICATION, WITH LONG-TERM CURRENT USE OF INSULIN (H): ICD-10-CM

## 2017-10-09 DIAGNOSIS — E11.8 TYPE 2 DIABETES MELLITUS WITH COMPLICATION, WITH LONG-TERM CURRENT USE OF INSULIN (H): ICD-10-CM

## 2017-10-09 DIAGNOSIS — I10 HTN, GOAL BELOW 140/90: ICD-10-CM

## 2017-10-09 RX ORDER — FUROSEMIDE 20 MG
TABLET ORAL
Qty: 30 TABLET | Refills: 5 | Status: SHIPPED | OUTPATIENT
Start: 2017-10-09 | End: 2018-04-10

## 2017-10-09 RX ORDER — LISINOPRIL 20 MG/1
TABLET ORAL
Qty: 30 TABLET | Refills: 5 | Status: SHIPPED | OUTPATIENT
Start: 2017-10-09 | End: 2018-04-10

## 2017-10-09 NOTE — TELEPHONE ENCOUNTER
Lasix  Routing refill request to provider for review/approval because:  A break in medication, patient should have been out of medication >1 month ago    Lisinopril  Routing refill request to provider for review/approval because:  A break in medication, patient should have been out of medication >1 month ago    Metformin  Routing refill request to provider for review/approval because:  A break in medication, patient should have been out of medication >2 month ago    Maty Reyna RN  [facility]

## 2017-10-09 NOTE — TELEPHONE ENCOUNTER
Lasix      Last Written Prescription Date: 3/20/17  Last Fill Quantity: 30, # refills: 5  Last Office Visit with [facility] or [facility] prescribing provider: 5/22/17       Potassium   Date Value Ref Range Status   05/22/2017 4.7 3.4 - 5.3 mmol/L Final     Creatinine   Date Value Ref Range Status   05/22/2017 0.77 0.66 - 1.25 mg/dL Final     BP Readings from Last 3 Encounters:   05/22/17 112/65   10/26/16 120/66 08/02/16 113/70     Lisinopril      Last Written Prescription Date: 3/20/17  Last Fill Quantity: 30, # refills: 5  Last Office Visit with [facility], [facility] or [facility] prescribing provider: 5/22/17       Potassium   Date Value Ref Range Status   05/22/2017 4.7 3.4 - 5.3 mmol/L Final     Creatinine   Date Value Ref Range Status   05/22/2017 0.77 0.66 - 1.25 mg/dL Final     BP Readings from Last 3 Encounters:   05/22/17 112/65   10/26/16 120/66 08/02/16 113/70     Metformin         Last Written Prescription Date: 2/20/17  Last Fill Quantity: 60, # refills: 5  Last Office Visit with [facility], [facility] or [facility] prescribing provider:  5/22/17        BP Readings from Last 3 Encounters:   05/22/17 112/65   10/26/16 120/66   08/02/16 113/70     Lab Results   Component Value Date    MICROL 60 03/23/2016     Lab Results   Component Value Date    UMALCR 60.93 03/23/2016     Creatinine   Date Value Ref Range Status   05/22/2017 0.77 0.66 - 1.25 mg/dL Final   ]  GFR Estimate   Date Value Ref Range Status   05/22/2017 >90  Non  GFR Calc   >60 mL/min/1.7m2 Final   10/26/2016 >90  Non  GFR Calc   >60 mL/min/1.7m2 Final   03/23/2016 >90  Non  GFR Calc   >60 mL/min/1.7m2 Final     GFR Estimate If Black   Date Value Ref Range Status   05/22/2017 >90   GFR Calc   >60 mL/min/1.7m2 Final   10/26/2016 >90   GFR Calc   >60 mL/min/1.7m2 Final   03/23/2016 >90   GFR Calc   >60 mL/min/1.7m2 Final     Lab Results   Component Value Date    CHOL 179  05/22/2017     Lab Results   Component Value Date    HDL 39 05/22/2017     Lab Results   Component Value Date    LDL 72 05/22/2017     Lab Results   Component Value Date    TRIG 339 05/22/2017     Lab Results   Component Value Date    CHOLHDLRATIO 5.0 08/09/2012     Lab Results   Component Value Date    AST 12 05/22/2017     Lab Results   Component Value Date    ALT 12 05/22/2017     Lab Results   Component Value Date    A1C 6.0 05/22/2017    A1C 5.9 10/26/2016    A1C 6.5 03/23/2016    A1C 6.2 04/16/2015    A1C 6.2 10/13/2014     Potassium   Date Value Ref Range Status   05/22/2017 4.7 3.4 - 5.3 mmol/L Final

## 2017-10-11 ENCOUNTER — ALLIED HEALTH/NURSE VISIT (OUTPATIENT)
Dept: FAMILY MEDICINE | Facility: OTHER | Age: 77
End: 2017-10-11
Payer: COMMERCIAL

## 2017-10-11 DIAGNOSIS — Z23 NEED FOR PROPHYLACTIC VACCINATION AND INOCULATION AGAINST INFLUENZA: Primary | ICD-10-CM

## 2017-10-11 PROCEDURE — 90662 IIV NO PRSV INCREASED AG IM: CPT

## 2017-10-11 PROCEDURE — G0008 ADMIN INFLUENZA VIRUS VAC: HCPCS

## 2017-10-11 NOTE — PROGRESS NOTES
Injectable Influenza Immunization Documentation    1.  Is the person to be vaccinated sick today?   No    2. Does the person to be vaccinated have an allergy to a component   of the vaccine?   No    3. Has the person to be vaccinated ever had a serious reaction   to influenza vaccine in the past?   No    4. Has the person to be vaccinated ever had Guillain-Barré syndrome?   No    Form completed by Tracey De Jesus MA     10/11/2017

## 2017-10-11 NOTE — MR AVS SNAPSHOT
After Visit Summary   10/11/2017    Derek Aragon    MRN: [id number]           Patient Information     Date Of Birth          1940        Visit Information        Provider Department      10/11/2017 1:30 PM JOEL GARRETT NURSE, [facility]        Today's Diagnoses     Need for prophylactic vaccination and inoculation against influenza    -  1       Follow-ups after your visit        Who to contact     If you have questions or need follow up information about today's clinic visit or your schedule please contact [facility] directly at [phone].  Normal or non-critical lab and imaging results will be communicated to you by [email or web address], letter or phone within 4 business days after the clinic has received the results. If you do not hear from us within 7 days, please contact the clinic through [employer] or phone. If you have a critical or abnormal lab result, we will notify you by phone as soon as possible.  Submit refill requests through [employer] or call your pharmacy and they will forward the refill request to us. Please allow 3 business days for your refill to be completed.          Additional Information About Your Visit        MyChart Information     [employer] gives you secure access to your electronic health record. If you see a primary care provider, you can also send messages to your care team and make appointments. If you have questions, please call your primary care clinic.  If you do not have a primary care provider, please call [phone] and they will assist you.        Care EveryWhere ID     This is your Care EveryWhere ID. This could be used by other organizations to access your [place] medical records  [id number]         Blood Pressure from Last 3 Encounters:   05/22/17 112/65   10/26/16 120/66   08/02/16 113/70    Weight from Last 3 Encounters:   05/22/17 276 lb (125.2 kg)   10/26/16 287 lb (130.2 kg)   08/02/16 287 lb (130.2 kg)              We Performed the  Following     ADMIN INFLUENZA (For MEDICARE Patients ONLY) []     FLU VACCINE, INCREASED ANTIGEN, PRESV FREE, AGE 65+ [32289]        Primary Care Provider Office Phone # Fax #    Salvador Duarte [phone]       [address] [facility] [address]        Equal Access to Services     CASSIA HAWKINS : Hadii aad ku hadasho Soomaali, waaxda luqadaha, qaybta kaalmada adeegyada, waxay indioin hayaan adeeg kharash laderickn . So [facility] [phone].    ATENCIÓN: Si habla español, tiene a castro disposición servicios gratuitos de asistencia lingüística. Llame al [phone].    We comply with applicable federal civil rights laws and [place] laws. We do not discriminate on the basis of race, color, national origin, age, disability, sex, sexual orientation, or gender identity.            Thank you!     Thank you for choosing [facility]  for your care. Our goal is always to provide you with excellent care. Hearing back from our patients is one way we can continue to improve our services. Please take a few minutes to complete the written survey that you may receive in the mail after your visit with us. Thank you!             Your Updated Medication List - Protect others around you: Learn how to safely use, store and throw away your medicines at www.disposemymeds.org.          This list is accurate as of: 10/11/17  2:44 PM.  Always use your most recent med list.                   Brand Name Dispense Instructions for use Diagnosis    acyclovir 5 % cream    ZOVIRAX    5 g    Apply topically 5 times daily    HSV (herpes simplex virus) infection       albuterol (2.5 MG/3ML) 0.083% neb solution     90 mL    NEBULIZE 1 VIAL BY MOUTH EV NANCY 6 HOURS AS NEEDED FOR W HEEZING / SHORTNESS OF DREW TH /DYSPNEA    Chest congestion       ALLERGY RELIEF 10 MG tablet   Generic drug:  loratadine     30 tablet    Take 1 tablet (10 mg) by mouth daily as needed for allergies BRAND NAME IS EQUATE    Seasonal allergic  rhinitis       aspirin  MG EC tablet     100 tablet    TAKE 1 TABLET BY MOUTH EVERY DAY    Hyperlipidemia LDL goal <100, Type 2 diabetes mellitus with diabetic neuropathy (H), Essential hypertension with goal blood pressure less than 140/90       blood glucose monitoring lancets     3 Box    Use to test blood sugar 4 times daily or as directed.    Type 2 diabetes mellitus with diabetic neuropathy, with long-term current use of insulin (H)       * blood glucose monitoring test strip    no brand specified    100 each    Test 4 times daily.    Type 2 diabetes, HbA1c goal < 7% (H)       * blood glucose monitoring test strip    no brand specified    100 strip    Use to test blood sugar daily or as  times daily or as directed.    Type 2 diabetes mellitus with complication, with long-term current use of insulin (H)       docusate sodium 100 MG tablet    COLACE    60 tablet    Take 100 mg by mouth daily    Constipation, unspecified constipation type       DULoxetine 30 MG EC capsule    CYMBALTA    180 capsule    TAKE 1 CAPSULE BY MOUTH TWICE A DAY    Adjustment disorder with depressed mood       furosemide 20 MG tablet    LASIX    30 tablet    TAKE 1 TABLET BY MOUTH EVERY DAY    HTN, goal below 140/90       gabapentin 300 MG capsule    NEURONTIN    90 capsule    TAKE 1 CAPSULE BY MOUTH DAILY AT BEDTIME    Pain of left lower leg       hydrochlorothiazide 25 MG tablet    HYDRODIURIL    90 tablet    TAKE 1 TABLET BY MOUTH EVERY DAY    Essential hypertension with goal blood pressure less than 140/90       insulin pen needle 30G X 8 MM    NOVOFINE    100 each    1 Device 2 times daily    Type 2 diabetes mellitus with complication, with long-term current use of insulin (H), Type 2 diabetes mellitus with diabetic neuropathy, with long-term current use of insulin (H)       LANTUS SOLOSTAR 100 UNIT/ML injection   Generic drug:  insulin glargine     45 mL    15 units every morning and 25 units every HS    Type 2 diabetes  mellitus with complication, with long-term current use of insulin (H)       lidocaine 5 % Patch    LIDODERM    30 patch    Place 1 patch onto the skin daily as needed To back PRN    Back pain       lisinopril 20 MG tablet    PRINIVIL/ZESTRIL    30 tablet    TAKE 1 TABLET BY MOUTH EVERY DAY    HTN, goal below 140/90       * metFORMIN 1000 MG tablet    GLUCOPHAGE    60 tablet    TAKE 1 TABLET BY MOUTH TWIC E A DAY WITH MEALS    Type 2 diabetes, HbA1c goal < 7% (H)       * metFORMIN 1000 MG tablet    GLUCOPHAGE    60 tablet    TAKE 1 TABLET BY MOUTH TWICE A DAY WITH MEALS    Type 2 diabetes mellitus with complication, with long-term current use of insulin (H)       Miconazole Nitrate Powd powder     2 Bottle    Apply topically daily as needed    Yeast dermatitis       NYSTOP [id number] UNIT/GM Powd   Generic drug:  nystatin     30 g    APPLY TO BUTTOCKS; GROIN; AND PERINEUM 3 TIMES A DAY AS NEEDED.    Yeast dermatitis       order for DME      Equipment being ordered: CPAP at previous home settings    MIO (obstructive sleep apnea)       rOPINIRole 0.25 MG tablet    REQUIP    90 tablet    Take 1 tablet (0.25 mg) by mouth At Bedtime    Restless leg syndrome       SENNA-docusate sodium 8.6-50 MG Tabs     90 tablet    TAKE 1 TABLET BY MOUTH EVERY DAY    Constipation, unspecified constipation type       simvastatin 20 MG tablet    ZOCOR    30 tablet    TAKE 1 TABLET BY MOUTH EVER Y EVENING    Hyperlipidemia LDL goal <100       * Notice:  This list has 4 medication(s) that are the same as other medications prescribed for you. Read the directions carefully, and ask your doctor or other care provider to review them with you.

## 2017-11-08 DIAGNOSIS — F43.21 ADJUSTMENT DISORDER WITH DEPRESSED MOOD: ICD-10-CM

## 2017-11-08 DIAGNOSIS — I10 ESSENTIAL HYPERTENSION WITH GOAL BLOOD PRESSURE LESS THAN 140/90: ICD-10-CM

## 2017-11-08 RX ORDER — HYDROCHLOROTHIAZIDE 25 MG/1
TABLET ORAL
Qty: 90 TABLET | Refills: 0 | Status: SHIPPED | OUTPATIENT
Start: 2017-11-08 | End: 2018-02-01

## 2017-11-08 NOTE — TELEPHONE ENCOUNTER
Cymbalta  Routing refill request to provider for review/approval because:  PHQ-9 >4    HCTZ  Prescription approved per [facility] Refill Protocol.    Maty Reyna RN  [facility]

## 2017-11-08 NOTE — TELEPHONE ENCOUNTER
Requested Prescriptions   Pending Prescriptions Disp Refills     DULoxetine (CYMBALTA) 30 MG EC capsule [Pharmacy Med Name: DULOXETINE 30MG DR CAPSULE] 180 capsule      Sig: TAKE 1 CAPSULE BY MOUTH TWICE A DAY    Serotonin-Norepinephrine Reuptake Inhibitors  Failed    11/8/2017  8:56 AM       Failed - PHQ-9 score of less than 5 in past 6 months    Please review PHQ-9 score.          Passed - Blood pressure under 140/90    BP Readings from Last 3 Encounters:   05/22/17 112/65   10/26/16 120/66 08/02/16 113/70                Passed - Patient is age 18 or older       Passed - Recent (6 mo) or future visit with authorizing provider's specialty    Patient had office visit in the last 6 months or has a visit in the next 30 days with authorizing provider.  See chart review.             hydrochlorothiazide (HYDRODIURIL) 25 MG tablet [Pharmacy Med Name: HYDROCHLOROTHIAZIDE 25MG TAB] 90 tablet      Sig: TAKE 1 TABLET BY MOUTH EVERY DAY    Diuretics (Including Combos) Protocol Passed    11/8/2017  8:56 AM       Passed - Blood pressure under 140/90    BP Readings from Last 3 Encounters:   05/22/17 112/65   10/26/16 120/66   08/02/16 113/70                Passed - Recent or future visit with authorizing provider's specialty    Patient had office visit in the last year or has a visit in the next 30 days with authorizing provider.  See chart review.              Passed - Patient is age 18 or older       Passed - Normal serum creatinine on file in past 12 months    Recent Labs   Lab Test  05/22/17   1405   CR  0.77             Passed - Normal serum potassium on file in past 12 months    Recent Labs   Lab Test  05/22/17   1405   POTASSIUM  4.7                   Passed - Normal serum sodium on file in past 12 months    Recent Labs   Lab Test  05/22/17   1405   NA  143              PHQ-9 score:    PHQ-9 SCORE 5/22/2017   Total Score -   Total Score 9

## 2017-11-09 RX ORDER — DULOXETIN HYDROCHLORIDE 30 MG/1
CAPSULE, DELAYED RELEASE ORAL
Qty: 180 CAPSULE | Refills: 0 | Status: SHIPPED | OUTPATIENT
Start: 2017-11-09 | End: 2017-11-24

## 2017-11-10 ENCOUNTER — CARE COORDINATION (OUTPATIENT)
Dept: GERIATRIC MEDICINE | Facility: CLINIC | Age: 77
End: 2017-11-10

## 2017-11-10 NOTE — PROGRESS NOTES
11/10/2017  CM received notification from members spouse that they continue to get a bill from [facility] regarding the equipment repairs.    JENNIFER contacted [facility] to review these charges and determine if the fees being charged are part of the members monthly spend down.    Awaiting return call.    Jossy Cortes RN-[facility]   [phone]       11/10/2017  JENNIFER received the following response from [facility]:    Yup.  We have a spenddown here.  --[facility]        From: Jossy Cortes [[email or web address]]      updated Mattie, members spouse that she does need to pay this bill and does not need to pay the late fee.    Jossy Cortes RN-[facility]   [phone]       11/10/2017  JENNIFER received this confirmation r/t the late fee:    Jossy,              OK.  My  said they add that charge (even though it doesn t show up on the account).  But she says she will waive that fee if they pay the statement.  So you can let her know that it is just the $224.00 that she needs to pay and we will be  all good .  --Jhonatan Cortes RN-[facility]   [phone]

## 2017-11-22 ENCOUNTER — TELEPHONE (OUTPATIENT)
Dept: CARE COORDINATION | Facility: CLINIC | Age: 77
End: 2017-11-22

## 2017-11-22 NOTE — TELEPHONE ENCOUNTER
Ok to continue skilled nursing every other week, [facility] for disease management assessment and education     Ok for late recert, last day of recert was yesterday    Bettie Cabrera RN  [facility]   [phone]    [facility] utilizes an encounter to take the place of a direct phone call to your office. Please take a moment to review the below request. Your reply to this encounter will act as a verbal OK of orders if requested below. Thank you.

## 2017-11-24 DIAGNOSIS — F43.21 ADJUSTMENT DISORDER WITH DEPRESSED MOOD: ICD-10-CM

## 2017-11-24 NOTE — TELEPHONE ENCOUNTER
DULoxetine (CYMBALTA) 30 MG EC capsule    Last Written Prescription Date: 11-9-17  Last Fill Quantity: 180, # refills: 0  Last Office Visit with G, P or [facility] prescribing provider: 5-22-17        BP Readings from Last 3 Encounters:   05/22/17 112/65   10/26/16 120/66   08/02/16 113/70     Pulse: (for Fetzima)  Creatinine   Date Value Ref Range Status   05/22/2017 0.77 0.66 - 1.25 mg/dL Final   ]    Last PHQ-9 score on record=   PHQ-9 SCORE 5/22/2017   Total Score -   Total Score 9

## 2017-11-24 NOTE — TELEPHONE ENCOUNTER
Note from pharmacy: PT STATES TAKING 1C QD. IF CORRECT CAN WE GET A CURRENTPRESCRIPTION? THANKS    Will route to provider to advise.     Maty eRyna RN  [facility] [place] [facility]

## 2017-11-24 NOTE — TELEPHONE ENCOUNTER
Requested Prescriptions   Pending Prescriptions Disp Refills     DULoxetine (CYMBALTA) 30 MG EC capsule [Pharmacy Med Name: DULOXETINE 30MG DR CAPSULE] 180 capsule      Sig: TAKE 1 CAPSULE BY MOUTH TWICE A DAY    Serotonin-Norepinephrine Reuptake Inhibitors  Failed    11/24/2017 12:23 PM       Failed - PHQ-9 score of less than 5 in past 6 months    Please review PHQ-9 score.          Failed - Recent (6 mo) or future visit with authorizing provider's specialty    Patient had office visit in the last 6 months or has a visit in the next 30 days with authorizing provider.  See chart review.            Passed - Blood pressure under 140/90    BP Readings from Last 3 Encounters:   05/22/17 112/65   10/26/16 120/66   08/02/16 113/70                Passed - Recent or future visit with authorizing provider's specialty    Patient had office visit in the last year or has a visit in the next 30 days with authorizing provider.  See chart review.              Passed - Patient is age 18 or older        PHQ-9 score:    PHQ-9 SCORE 5/22/2017   Total Score -   Total Score 9

## 2017-11-27 PROCEDURE — G0179 MD RECERTIFICATION HHA PT: HCPCS | Performed by: FAMILY MEDICINE

## 2017-11-27 RX ORDER — DULOXETIN HYDROCHLORIDE 30 MG/1
CAPSULE, DELAYED RELEASE ORAL
Qty: 180 CAPSULE | Refills: 0 | Status: SHIPPED | OUTPATIENT
Start: 2017-11-27 | End: 2018-01-19

## 2017-12-08 DIAGNOSIS — E78.5 HYPERLIPIDEMIA LDL GOAL <100: ICD-10-CM

## 2017-12-08 DIAGNOSIS — K59.00 CONSTIPATION, UNSPECIFIED CONSTIPATION TYPE: ICD-10-CM

## 2017-12-08 NOTE — TELEPHONE ENCOUNTER
Requested Prescriptions   Pending Prescriptions Disp Refills     simvastatin (ZOCOR) 20 MG tablet [Pharmacy Med Name: SIMVASTATIN 20MG TAB] 30 tablet      Sig: TAKE 1 TABLET BY MOUTH EVERY EVENING    Statins Protocol Passed    12/8/2017 10:07 AM       Passed - LDL on file in past 12 months    Recent Labs   Lab Test  05/22/17   1405   LDL  72            Passed - No abnormal creatine kinase in past 12 months    No lab results found.         Passed - Recent or future visit with authorizing provider    Patient had office visit in the last year or has a visit in the next 30 days with authorizing provider.  See chart review.              Passed - Patient is age 18 or older        Sennosides-Docusate Sodium (SENNA-DOCUSATE SODIUM) 8.6-50 MG TABS [Pharmacy Med Name: DOCUSATE/SENNA 50MG-8.6MG TAB] 90 tablet      Sig: TAKE 1 TABLET BY MOUTH EVERY DAY    There is no refill protocol information for this order

## 2017-12-11 RX ORDER — SIMVASTATIN 20 MG
TABLET ORAL
Qty: 30 TABLET | Refills: 5 | Status: SHIPPED | OUTPATIENT
Start: 2017-12-11 | End: 2018-01-20

## 2017-12-11 RX ORDER — SENNA AND DOCUSATE SODIUM 50; 8.6 MG/1; MG/1
TABLET, FILM COATED ORAL
Qty: 90 TABLET | Refills: 1 | Status: SHIPPED | OUTPATIENT
Start: 2017-12-11 | End: 2018-01-01

## 2017-12-11 NOTE — TELEPHONE ENCOUNTER
Routing refill request to provider for review/approval because:  Labs out of range:  BRUCE Reyna RN  [facility]

## 2018-01-01 ENCOUNTER — TELEPHONE (OUTPATIENT)
Dept: FAMILY MEDICINE | Facility: OTHER | Age: 78
End: 2018-01-01

## 2018-01-01 ENCOUNTER — PATIENT OUTREACH (OUTPATIENT)
Dept: GERIATRIC MEDICINE | Facility: CLINIC | Age: 78
End: 2018-01-01

## 2018-01-01 ENCOUNTER — OFFICE VISIT (OUTPATIENT)
Dept: FAMILY MEDICINE | Facility: OTHER | Age: 78
End: 2018-01-01
Payer: COMMERCIAL

## 2018-01-01 ENCOUNTER — TELEPHONE (OUTPATIENT)
Dept: CARE COORDINATION | Facility: CLINIC | Age: 78
End: 2018-01-01

## 2018-01-01 VITALS
RESPIRATION RATE: 20 BRPM | BODY MASS INDEX: 41.5 KG/M2 | DIASTOLIC BLOOD PRESSURE: 58 MMHG | WEIGHT: 281 LBS | HEART RATE: 89 BPM | TEMPERATURE: 97.5 F | SYSTOLIC BLOOD PRESSURE: 110 MMHG | OXYGEN SATURATION: 96 %

## 2018-01-01 VITALS
RESPIRATION RATE: 20 BRPM | DIASTOLIC BLOOD PRESSURE: 70 MMHG | TEMPERATURE: 95.8 F | HEART RATE: 74 BPM | OXYGEN SATURATION: 97 % | SYSTOLIC BLOOD PRESSURE: 126 MMHG

## 2018-01-01 DIAGNOSIS — H61.23 BILATERAL IMPACTED CERUMEN: ICD-10-CM

## 2018-01-01 DIAGNOSIS — I10 ESSENTIAL HYPERTENSION WITH GOAL BLOOD PRESSURE LESS THAN 140/90: ICD-10-CM

## 2018-01-01 DIAGNOSIS — E66.01 MORBID OBESITY (H): ICD-10-CM

## 2018-01-01 DIAGNOSIS — Z79.4 TYPE 2 DIABETES MELLITUS WITH COMPLICATION, WITH LONG-TERM CURRENT USE OF INSULIN (H): ICD-10-CM

## 2018-01-01 DIAGNOSIS — K59.00 CONSTIPATION, UNSPECIFIED CONSTIPATION TYPE: ICD-10-CM

## 2018-01-01 DIAGNOSIS — F43.21 ADJUSTMENT DISORDER WITH DEPRESSED MOOD: ICD-10-CM

## 2018-01-01 DIAGNOSIS — Z79.4 TYPE 2 DIABETES MELLITUS WITH DIABETIC NEUROPATHY, WITH LONG-TERM CURRENT USE OF INSULIN (H): ICD-10-CM

## 2018-01-01 DIAGNOSIS — E11.8 TYPE 2 DIABETES MELLITUS WITH COMPLICATION, WITH LONG-TERM CURRENT USE OF INSULIN (H): ICD-10-CM

## 2018-01-01 DIAGNOSIS — Z02.2 ENCOUNTER FOR EXAMINATION FOR ADMISSION TO NURSING HOME: Primary | ICD-10-CM

## 2018-01-01 DIAGNOSIS — E11.8 TYPE 2 DIABETES MELLITUS WITH COMPLICATION, WITH LONG-TERM CURRENT USE OF INSULIN (H): Primary | ICD-10-CM

## 2018-01-01 DIAGNOSIS — E11.40 TYPE 2 DIABETES MELLITUS WITH DIABETIC NEUROPATHY, WITH LONG-TERM CURRENT USE OF INSULIN (H): ICD-10-CM

## 2018-01-01 DIAGNOSIS — Z23 NEED FOR PROPHYLACTIC VACCINATION AND INOCULATION AGAINST INFLUENZA: ICD-10-CM

## 2018-01-01 DIAGNOSIS — I10 HTN, GOAL BELOW 140/90: ICD-10-CM

## 2018-01-01 DIAGNOSIS — G25.81 RESTLESS LEG SYNDROME: ICD-10-CM

## 2018-01-01 DIAGNOSIS — Z79.4 TYPE 2 DIABETES MELLITUS WITH COMPLICATION, WITH LONG-TERM CURRENT USE OF INSULIN (H): Primary | ICD-10-CM

## 2018-01-01 DIAGNOSIS — E78.5 HYPERLIPIDEMIA LDL GOAL <100: ICD-10-CM

## 2018-01-01 DIAGNOSIS — Z86.73 HISTORY OF CVA (CEREBROVASCULAR ACCIDENT): ICD-10-CM

## 2018-01-01 LAB — HBA1C MFR BLD: 6.4 % (ref 0–5.6)

## 2018-01-01 PROCEDURE — 99214 OFFICE O/P EST MOD 30 MIN: CPT | Mod: 25 | Performed by: FAMILY MEDICINE

## 2018-01-01 PROCEDURE — 90662 IIV NO PRSV INCREASED AG IM: CPT | Performed by: FAMILY MEDICINE

## 2018-01-01 PROCEDURE — 36415 COLL VENOUS BLD VENIPUNCTURE: CPT | Performed by: FAMILY MEDICINE

## 2018-01-01 PROCEDURE — 99214 OFFICE O/P EST MOD 30 MIN: CPT | Performed by: FAMILY MEDICINE

## 2018-01-01 PROCEDURE — 83036 HEMOGLOBIN GLYCOSYLATED A1C: CPT | Performed by: FAMILY MEDICINE

## 2018-01-01 PROCEDURE — G0008 ADMIN INFLUENZA VIRUS VAC: HCPCS | Performed by: FAMILY MEDICINE

## 2018-01-01 RX ORDER — FUROSEMIDE 20 MG
20 TABLET ORAL DAILY
Qty: 90 TABLET | Refills: 3 | Status: SHIPPED | OUTPATIENT
Start: 2018-01-01

## 2018-01-01 RX ORDER — ASPIRIN 325 MG
TABLET, DELAYED RELEASE (ENTERIC COATED) ORAL
Qty: 100 TABLET | Refills: 1 | Status: ON HOLD | OUTPATIENT
Start: 2018-01-01 | End: 2019-01-01

## 2018-01-01 RX ORDER — LISINOPRIL 20 MG/1
20 TABLET ORAL DAILY
Qty: 90 TABLET | Refills: 1 | Status: SHIPPED | OUTPATIENT
Start: 2018-01-01 | End: 2019-01-01

## 2018-01-01 RX ORDER — HYDROCHLOROTHIAZIDE 25 MG/1
25 TABLET ORAL DAILY
Qty: 90 TABLET | Refills: 3 | Status: ON HOLD | OUTPATIENT
Start: 2018-01-01 | End: 2019-01-01

## 2018-01-01 RX ORDER — INSULIN GLARGINE 100 [IU]/ML
INJECTION, SOLUTION SUBCUTANEOUS
Qty: 45 ML | Refills: 5 | Status: ON HOLD | OUTPATIENT
Start: 2018-01-01 | End: 2019-01-01

## 2018-01-01 RX ORDER — DOCUSATE SODIUM AND SENNOSIDES 8.6; 5 MG/1; MG/1
TABLET, FILM COATED ORAL
Qty: 90 TABLET | Refills: 1 | Status: SHIPPED | OUTPATIENT
Start: 2018-01-01 | End: 2019-01-01

## 2018-01-01 RX ORDER — HYDROCHLOROTHIAZIDE 25 MG/1
TABLET ORAL
Qty: 90 TABLET | Refills: 0 | Status: SHIPPED | OUTPATIENT
Start: 2018-01-01 | End: 2018-01-01

## 2018-01-01 RX ORDER — DULOXETIN HYDROCHLORIDE 30 MG/1
CAPSULE, DELAYED RELEASE ORAL
Qty: 180 CAPSULE | Refills: 1 | Status: SHIPPED | OUTPATIENT
Start: 2018-01-01

## 2018-01-01 ASSESSMENT — PAIN SCALES - GENERAL
PAINLEVEL: NO PAIN (0)
PAINLEVEL: NO PAIN (0)

## 2018-01-01 ASSESSMENT — ACTIVITIES OF DAILY LIVING (ADL)
DEPENDENT_IADLS:: CLEANING;COOKING;LAUNDRY;SHOPPING;MEAL PREPARATION;MEDICATION MANAGEMENT;MONEY MANAGEMENT;TRANSPORTATION

## 2018-01-01 ASSESSMENT — PATIENT HEALTH QUESTIONNAIRE - PHQ9: SUM OF ALL RESPONSES TO PHQ QUESTIONS 1-9: 6

## 2018-01-09 ENCOUNTER — CARE COORDINATION (OUTPATIENT)
Dept: GERIATRIC MEDICINE | Facility: CLINIC | Age: 78
End: 2018-01-09

## 2018-01-09 NOTE — PROGRESS NOTES
Arranged transportation thru [facility] provider [facility] (phone: [phone]) for the below appt:  Appt Date & Time: 1/19/18 @ 9am  Clinic Name & Address:  [facility]  Transportation Provider: Ania   time:  8:30    Notified Mattie of  time.    Danelle Gant  Case Management Specialist   [facility]   [phone]

## 2018-01-12 ENCOUNTER — CARE COORDINATION (OUTPATIENT)
Dept: GERIATRIC MEDICINE | Facility: CLINIC | Age: 78
End: 2018-01-12

## 2018-01-12 NOTE — PROGRESS NOTES
1/12/2018  CM spoke with ;members spouse Mattie.  Mattie updated CM that she will be requesting Respite June 1 - 30th, 2018. CM requested she update CM if the dates change.    CM reviewed long-term of previous PCP.  Mattie stated they do have a clinic appointment scheduled the end of this month and will be selecting a new PCP at that time.      [facility] Six-Month Telephone Assessment    6 month telephone assessment completed on 1/12/2018.    ER visits: No  Hospitalizations: No  TCU stays Only for Respite Stays.   Significant health status changes: None   Falls/Injuries: No  ADL/IADL changes: No  Changes in services: No    Caregiver Assessment follow up: No concerns per Mattie    Goals: See POC in chart for goal progress documentation.     Will see client in 6 months for an annual health risk assessment.   Encouraged client to call CM with any questions or concerns in the meantime.     Jossy Cortes RN-[facility]   [phone]

## 2018-01-18 ENCOUNTER — TELEPHONE (OUTPATIENT)
Dept: CARE COORDINATION | Facility: CLINIC | Age: 78
End: 2018-01-18

## 2018-01-18 NOTE — TELEPHONE ENCOUNTER
Ok to continue skilled nursing every other week for assessment and education related to patients health status    Previously has seen Dr. Duarte and is seeing you tomorrow to establish primary care    Bettie Cabrera RN  [facility] [place]   [phone]    [facility] utilizes an encounter to take the place of a direct phone call to your office. Please take a moment to review the below request. Your reply to this encounter will act as a verbal OK of orders if requested below. Thank you.

## 2018-01-19 ENCOUNTER — OFFICE VISIT (OUTPATIENT)
Dept: FAMILY MEDICINE | Facility: OTHER | Age: 78
End: 2018-01-19
Payer: COMMERCIAL

## 2018-01-19 VITALS
HEART RATE: 70 BPM | WEIGHT: 276 LBS | DIASTOLIC BLOOD PRESSURE: 62 MMHG | RESPIRATION RATE: 20 BRPM | OXYGEN SATURATION: 99 % | SYSTOLIC BLOOD PRESSURE: 108 MMHG | BODY MASS INDEX: 40.76 KG/M2 | TEMPERATURE: 96.8 F

## 2018-01-19 DIAGNOSIS — Z86.73 HISTORY OF CVA (CEREBROVASCULAR ACCIDENT): ICD-10-CM

## 2018-01-19 DIAGNOSIS — E66.01 OBESITY, MORBID, BMI 40.0-49.9 (H): ICD-10-CM

## 2018-01-19 DIAGNOSIS — F43.21 ADJUSTMENT DISORDER WITH DEPRESSED MOOD: ICD-10-CM

## 2018-01-19 DIAGNOSIS — I73.9 PERIPHERAL VASCULAR DISEASE (H): ICD-10-CM

## 2018-01-19 DIAGNOSIS — Z79.4 TYPE 2 DIABETES MELLITUS WITH COMPLICATION, WITH LONG-TERM CURRENT USE OF INSULIN (H): ICD-10-CM

## 2018-01-19 DIAGNOSIS — E78.5 HYPERLIPIDEMIA LDL GOAL <100: ICD-10-CM

## 2018-01-19 DIAGNOSIS — E11.8 TYPE 2 DIABETES MELLITUS WITH COMPLICATION, WITH LONG-TERM CURRENT USE OF INSULIN (H): ICD-10-CM

## 2018-01-19 DIAGNOSIS — Z76.89 ENCOUNTER TO ESTABLISH CARE WITH NEW DOCTOR: Primary | ICD-10-CM

## 2018-01-19 DIAGNOSIS — G81.94 HEMIPARESIS OF LEFT NONDOMINANT SIDE DUE TO NONTRAUMATIC INTRACEREBRAL HEMORRHAGE (H): ICD-10-CM

## 2018-01-19 DIAGNOSIS — I61.9 HEMIPARESIS OF LEFT NONDOMINANT SIDE DUE TO NONTRAUMATIC INTRACEREBRAL HEMORRHAGE (H): ICD-10-CM

## 2018-01-19 DIAGNOSIS — F32.0 MILD MAJOR DEPRESSION (H): ICD-10-CM

## 2018-01-19 LAB
ANION GAP SERPL CALCULATED.3IONS-SCNC: 8 MMOL/L (ref 3–14)
BUN SERPL-MCNC: 25 MG/DL (ref 7–30)
CALCIUM SERPL-MCNC: 9 MG/DL (ref 8.5–10.1)
CHLORIDE SERPL-SCNC: 103 MMOL/L (ref 94–109)
CHOLEST SERPL-MCNC: 149 MG/DL
CO2 SERPL-SCNC: 29 MMOL/L (ref 20–32)
CREAT SERPL-MCNC: 0.86 MG/DL (ref 0.66–1.25)
GFR SERPL CREATININE-BSD FRML MDRD: 86 ML/MIN/1.7M2
GLUCOSE SERPL-MCNC: 111 MG/DL (ref 70–99)
HBA1C MFR BLD: 6.1 % (ref 4.3–6)
HDLC SERPL-MCNC: 40 MG/DL
LDLC SERPL CALC-MCNC: 60 MG/DL
NONHDLC SERPL-MCNC: 109 MG/DL
POTASSIUM SERPL-SCNC: 4.4 MMOL/L (ref 3.4–5.3)
SODIUM SERPL-SCNC: 140 MMOL/L (ref 133–144)
TRIGL SERPL-MCNC: 244 MG/DL

## 2018-01-19 PROCEDURE — 80048 BASIC METABOLIC PNL TOTAL CA: CPT | Performed by: FAMILY MEDICINE

## 2018-01-19 PROCEDURE — 36415 COLL VENOUS BLD VENIPUNCTURE: CPT | Performed by: FAMILY MEDICINE

## 2018-01-19 PROCEDURE — 99214 OFFICE O/P EST MOD 30 MIN: CPT | Performed by: FAMILY MEDICINE

## 2018-01-19 PROCEDURE — 80061 LIPID PANEL: CPT | Performed by: FAMILY MEDICINE

## 2018-01-19 PROCEDURE — 83036 HEMOGLOBIN GLYCOSYLATED A1C: CPT | Performed by: FAMILY MEDICINE

## 2018-01-19 RX ORDER — DULOXETIN HYDROCHLORIDE 30 MG/1
CAPSULE, DELAYED RELEASE ORAL
Qty: 180 CAPSULE | Refills: 0 | COMMUNITY
Start: 2018-01-19 | End: 2018-02-01

## 2018-01-19 RX ORDER — INSULIN GLARGINE 100 [IU]/ML
INJECTION, SOLUTION SUBCUTANEOUS
Qty: 45 ML | Refills: 3 | Status: SHIPPED | OUTPATIENT
Start: 2018-01-19 | End: 2018-01-01

## 2018-01-19 ASSESSMENT — PAIN SCALES - GENERAL: PAINLEVEL: NO PAIN (0)

## 2018-01-19 ASSESSMENT — PATIENT HEALTH QUESTIONNAIRE - PHQ9: SUM OF ALL RESPONSES TO PHQ QUESTIONS 1-9: 2

## 2018-01-19 NOTE — MR AVS SNAPSHOT
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      Future tests that were ordered for you today     Open Future Orders        Priority Expected Expires Ordered    Albumin Random Urine Quantitative with Creat Ratio Routine 1/19/2018 1/19/2019 1/19/2018            Who to contact     If you have questions or need follow up information about today's clinic visit or your schedule please contact [facility] directly at [phone].  Normal or non-critical lab and imaging results will be communicated to you by [employer], letter or phone within 4 business days after the clinic has received the results. If you do not hear from us within 7 days, please contact the clinic through [employer] or phone. If you have a critical or abnormal lab result, we will notify you by phone as soon as possible.  Submit refill requests through [employer] or call your pharmacy and they will forward the refill request to us. Please allow 3 business days for your refill to be completed.          Additional Information About Your Visit        MyChart Information     [employer] gives you secure access to your electronic health record. If  you see a primary care provider, you can also send messages to your care team and make appointments. If you have questions, please call your primary care clinic.  If you do not have a primary care provider, please call [phone] and they will assist you.        Care EveryWhere ID     This is your Care EveryWhere ID. This could be used by other organizations to access your [place] medical records  [id number]        Your Vitals Were     Pulse Temperature Respirations Pulse Oximetry BMI (Body Mass Index)       70 96.8  F (36  C) (Temporal) 20 99% 40.76 kg/m2        Blood Pressure from Last 3 Encounters:   01/19/18 108/62   05/22/17 112/65   10/26/16 120/66    Weight from Last 3 Encounters:   01/19/18 276 lb (125.2 kg)   05/22/17 276 lb (125.2 kg)   10/26/16 287 lb (130.2 kg)              We Performed the Following     Basic metabolic panel     Hemoglobin A1c     Lipid panel reflex to direct LDL Fasting          Today's Medication Changes          These changes are accurate as of: 1/19/18  9:51 AM.  If you have any questions, ask your nurse or doctor.               These medicines have changed or have updated prescriptions.        Dose/Directions    DULoxetine 30 MG EC capsule   Commonly known as:  CYMBALTA   This may have changed:  See the new instructions.   Used for:  Adjustment disorder with depressed mood   Changed by:  Rosalino Melton MD        TAKE 1 CAPSULE BY MOUTH ONCE  A DAY   Quantity:  180 capsule   Refills:  0            Where to get your medicines      These medications were sent to [id number] - [place], [place] - [address] [place] [id number]    Hours:  M-F 8:30-6:30; Sat 9-4; closed Sunday Phone:  [phone]     LANTUS SOLOSTAR 100 UNIT/ML injection                Primary Care Provider Office Phone # Fax #    Rosalino Melton [phone]       [address] [facility] [address]        Equal Access to Services     CASSIA HAWKINS AH: Matilde Dumont  wajourdanda luwileycarol, qaybta kabassam shepherd, evi deutschaaaleida ah. So [facility] [phone].    ATENCIÓN: Si emma daniel, tiene a castro disposición servicios gratuitos de asistencia lingüística. Catrina al [phone].    We comply with applicable federal civil rights laws and [place] laws. We do not discriminate on the basis of race, color, national origin, age, disability, sex, sexual orientation, or gender identity.            Thank you!     Thank you for choosing [facility]  for your care. Our goal is always to provide you with excellent care. Hearing back from our patients is one way we can continue to improve our services. Please take a few minutes to complete the written survey that you may receive in the mail after your visit with us. Thank you!             Your Updated Medication List - Protect others around you: Learn how to safely use, store and throw away your medicines at www.disposemymeds.org.          This list is accurate as of: 1/19/18  9:51 AM.  Always use your most recent med list.                   Brand Name Dispense Instructions for use Diagnosis    acyclovir 5 % cream    ZOVIRAX    5 g    Apply topically 5 times daily    HSV (herpes simplex virus) infection       albuterol (2.5 MG/3ML) 0.083% neb solution     90 mL    NEBULIZE 1 VIAL BY MOUTH EV NANCY 6 HOURS AS NEEDED FOR W HEEZING / SHORTNESS OF DRWE TH /DYSPNEA    Chest congestion       ALLERGY RELIEF 10 MG tablet   Generic drug:  loratadine     30 tablet    Take 1 tablet (10 mg) by mouth daily as needed for allergies BRAND NAME IS EQUATE    Seasonal allergic rhinitis       aspirin  MG EC tablet     100 tablet    TAKE 1 TABLET BY MOUTH EVERY DAY    Hyperlipidemia LDL goal <100, Type 2 diabetes mellitus with diabetic neuropathy (H), Essential hypertension with goal blood pressure less than 140/90       blood glucose monitoring lancets     3 Box    Use to test blood sugar 4 times daily or as directed.    Type  2 diabetes mellitus with diabetic neuropathy, with long-term current use of insulin (H)       blood glucose monitoring test strip    no brand specified    100 strip    Use to test blood sugar daily or as  times daily or as directed.    Type 2 diabetes mellitus with complication, with long-term current use of insulin (H)       docusate sodium 100 MG tablet    COLACE    60 tablet    Take 100 mg by mouth daily    Constipation, unspecified constipation type       DULoxetine 30 MG EC capsule    CYMBALTA    180 capsule    TAKE 1 CAPSULE BY MOUTH ONCE  A DAY    Adjustment disorder with depressed mood       furosemide 20 MG tablet    LASIX    30 tablet    TAKE 1 TABLET BY MOUTH EVERY DAY    HTN, goal below 140/90       hydrochlorothiazide 25 MG tablet    HYDRODIURIL    90 tablet    TAKE 1 TABLET BY MOUTH EVERY DAY    Essential hypertension with goal blood pressure less than 140/90       insulin pen needle 30G X 8 MM    NOVOFINE    100 each    1 Device 2 times daily    Type 2 diabetes mellitus with complication, with long-term current use of insulin (H), Type 2 diabetes mellitus with diabetic neuropathy, with long-term current use of insulin (H)       LANTUS SOLOSTAR 100 UNIT/ML injection   Generic drug:  insulin glargine     45 mL    15 units every morning and 25 units every HS    Type 2 diabetes mellitus with complication, with long-term current use of insulin (H)       lidocaine 5 % Patch    LIDODERM    30 patch    Place 1 patch onto the skin daily as needed To back PRN    Back pain       lisinopril 20 MG tablet    PRINIVIL/ZESTRIL    30 tablet    TAKE 1 TABLET BY MOUTH EVERY DAY    HTN, goal below 140/90       metFORMIN 1000 MG tablet    GLUCOPHAGE    60 tablet    TAKE 1 TABLET BY MOUTH TWICE A DAY WITH MEALS    Type 2 diabetes mellitus with complication, with long-term current use of insulin (H)       Miconazole Nitrate Powd powder     2 Bottle    Apply topically daily as needed    Yeast dermatitis       NYSTOP 202062  UNIT/GM Powd   Generic drug:  nystatin     30 g    APPLY TO BUTTOCKS; GROIN; AND PERINEUM 3 TIMES A DAY AS NEEDED.    Yeast dermatitis       order for DME      Equipment being ordered: CPAP at previous home settings    MIO (obstructive sleep apnea)       rOPINIRole 0.25 MG tablet    REQUIP    90 tablet    Take 1 tablet (0.25 mg) by mouth At Bedtime    Restless leg syndrome       SENNA-docusate sodium 8.6-50 MG Tabs     90 tablet    TAKE 1 TABLET BY MOUTH EVERY DAY    Constipation, unspecified constipation type       simvastatin 20 MG tablet    ZOCOR    30 tablet    TAKE 1 TABLET BY MOUTH EVERY EVENING    Hyperlipidemia LDL goal <100

## 2018-01-19 NOTE — PROGRESS NOTES
SUBJECTIVE:   Derek Aragon is a 77 year old male who presents to clinic today for the following health issues:        New Patient/Transfer of Care  Patient has been under the care of Salvador Jones who retired. They thought they were seeing Dr. Sharma today..Patient medications reconciled, PMH and Problem list reviewed and HM updated.      Thought they were seeing Zachary  Wants more labs done then A1C, Kidney function. Have microalbumin lab pended for future due to wife will have to collect sample and bring in.       Diabetes Follow-up    Patient is checking blood sugars: once daily.  Results are as follows:       am - 100-120        Diabetic concerns: None     Symptoms of hypoglycemia (low blood sugar): none     Paresthesias (numbness or burning in feet) or sores: No     Date of last diabetic eye exam: < 1 year    Hyperlipidemia Follow-Up      Rate your low fat/cholesterol diet?: good    Taking statin?  Yes, no muscle aches from statin. Taking low dose    Other lipid medications/supplements?:  none    Hypertension Follow-up      Outpatient blood pressures are being checked at home.  Results are stable.    Low Salt Diet: no added salt  BP Readings from Last 2 Encounters:   01/19/18 108/62   05/22/17 112/65     Hemoglobin A1C (%)   Date Value   05/22/2017 6.0   10/26/2016 5.9     LDL Cholesterol Calculated (mg/dL)   Date Value   05/22/2017 72   03/23/2016 68   Cerebrovascular Follow-up      Patient history: ischemic cerebrovascular incident    Residual symptoms: Weakness in the arm on the left and Weakness in the leg on the left    Worsened or new symptoms since last visit: No    Daily aspirin use: Yes    Hypertension controlled: Yes    Depression Followup    Status since last visit: Stable     See PHQ-9 for current symptoms.  Other associated symptoms: None    Complicating factors:   Significant life event:  Yes-  Stroke several years ago with left hemiparesis.   Current substance abuse:   None  Anxiety or Panic symptoms:  No    PHQ-9 Score and MyChart F/U Questions 10/26/2016 5/22/2017 1/19/2018   Total Score 18 9 2   Q9: Suicide Ideation Not at all Not at all Not at all       PHQ-9  English  PHQ-9   Any Language  Suicide Assessment Five-step Evaluation and Treatment (SAFE-T)          Problem list and histories reviewed & adjusted, as indicated.  Additional history: as documented    Patient Active Problem List   Diagnosis     Anxiety state     Hyperlipidemia LDL goal <100     Acute ischemic stroke (H)     MIO (obstructive sleep apnea)     Restless leg syndrome     Hemiparesis - left     DM circ dis type II, uncontrolled (ischemic CVA 2/25/12)     Morbid obesity (H)     Peripheral vascular disease (H)     Mild major depression (H)     Insomnia     History of CVA (cerebrovascular accident) wit left hemiparesis     Back pain     Constipation     Health Care Home     Essential hypertension with goal blood pressure less than 140/90     Advance Care Planning     Anal fissure     Chest pain     Anemia     Acute renal failure (H)     DVT prophylaxis     Other specified disease of nail     Dermatophytosis of nail     Generalized muscle weakness     Pyuria     Yeast dermatitis     Pain of left lower leg     Obesity, morbid, BMI 40.0-49.9 (H)     Type 2 diabetes mellitus with diabetic neuropathy (H)     Adjustment disorder with depressed mood     Type 2 diabetes mellitus with complication, with long-term current use of insulin (H)     Past Surgical History:   Procedure Laterality Date     C OPEN RX FEMUR FX+INTRAMED EDE Left     2016     COLONOSCOPY  12/21/2012    Procedure: COLONOSCOPY;  colonoscopy;  Surgeon: Chris Crockett MD;  Location:  GI     ENT SURGERY      Parotid gland tumor removal     HC REMOVAL GALLBLADDER      Cholecystectomy       Social History   Substance Use Topics     Smoking status: Former Smoker     Packs/day: 1.00     Years: 20.00     Types: Cigarettes     Quit date: 1/1/1985      Smokeless tobacco: Never Used     Alcohol use No     Family History   Problem Relation Age of Onset     Cardiovascular Mother      MI age 75 yrs     DIABETES Mother      Obesity Mother      CEREBROVASCULAR DISEASE Father      anneurysm age 52      Obesity Maternal Grandmother      Psychotic Disorder Maternal Grandmother      suicide     Psychotic Disorder Maternal Grandfather      suicide     Lipids Sister          Current Outpatient Prescriptions   Medication Sig Dispense Refill     DULoxetine (CYMBALTA) 30 MG EC capsule TAKE 1 CAPSULE BY MOUTH ONCE  A  capsule 0     LANTUS SOLOSTAR 100 UNIT/ML soln 15 units every morning and 25 units every HS 45 mL 3     simvastatin (ZOCOR) 20 MG tablet TAKE 1 TABLET BY MOUTH EVERY EVENING 30 tablet 5     Sennosides-Docusate Sodium (SENNA-DOCUSATE SODIUM) 8.6-50 MG TABS TAKE 1 TABLET BY MOUTH EVERY DAY 90 tablet 1     hydrochlorothiazide (HYDRODIURIL) 25 MG tablet TAKE 1 TABLET BY MOUTH EVERY DAY 90 tablet 0     metFORMIN (GLUCOPHAGE) 1000 MG tablet TAKE 1 TABLET BY MOUTH TWICE A DAY WITH MEALS 60 tablet 5     furosemide (LASIX) 20 MG tablet TAKE 1 TABLET BY MOUTH EVERY DAY 30 tablet 5     lisinopril (PRINIVIL/ZESTRIL) 20 MG tablet TAKE 1 TABLET BY MOUTH EVERY DAY 30 tablet 5     blood glucose monitoring (NO BRAND SPECIFIED) test strip Use to test blood sugar daily or as  times daily or as directed. 100 strip 3     aspirin  MG EC tablet TAKE 1 TABLET BY MOUTH EVERY  tablet 1     Miconazole Nitrate POWD powder Apply topically daily as needed 2 Bottle 1     insulin pen needle (NOVOFINE) 30G X 8 MM 1 Device 2 times daily 100 each 6     docusate sodium (COLACE) 100 MG tablet Take 100 mg by mouth daily 60 tablet 3     blood glucose monitoring (MICHELLE MICROLET) lancets Use to test blood sugar 4 times daily or as directed. 3 Box 3     acyclovir (ZOVIRAX) 5 % cream Apply topically 5 times daily 5 g 3     rOPINIRole (REQUIP) 0.25 MG tablet Take 1 tablet (0.25 mg)  by mouth At Bedtime 90 tablet 1     loratadine (ALLERGY RELIEF) 10 MG tablet Take 1 tablet (10 mg) by mouth daily as needed for allergies BRAND NAME IS EQUATE 30 tablet 0     albuterol (2.5 MG/3ML) 0.083% nebulizer solution NEBULIZE 1 VIAL BY MOUTH EV NANCY 6 HOURS AS NEEDED FOR W HEEZING / SHORTNESS OF DREW TH /DYSPNEA 90 mL 3     Nystatin (NYSTOP) 983243 UNIT/GM POWD APPLY TO BUTTOCKS; GROIN; AND PERINEUM 3 TIMES A DAY AS NEEDED. 30 g 3     ORDER FOR DME, SET TO LOCAL PRINT, Equipment being ordered: CPAP at previous home settings       [DISCONTINUED] DULoxetine (CYMBALTA) 30 MG EC capsule TAKE 1 CAPSULE BY MOUTH TWICE A DAY (Patient taking differently: TAKE 1 CAPSULE BY MOUTH ONCE  A DAY) 180 capsule 0     [DISCONTINUED] LANTUS SOLOSTAR 100 UNIT/ML soln 15 units every morning and 25 units every HS 45 mL 1     lidocaine (LIDODERM) 5 % patch Place 1 patch onto the skin daily as needed To back PRN (Patient not taking: Reported on 1/19/2018) 30 patch      [DISCONTINUED] metFORMIN (GLUCOPHAGE) 1000 MG tablet TAKE 1 TABLET BY MOUTH TWIC E A DAY WITH MEALS 60 tablet 11     [DISCONTINUED] simvastatin (ZOCOR) 20 MG tablet TAKE 1 TABLET BY MOUTH EVER Y EVENING 30 tablet 11     Allergies   Allergen Reactions     No Known Drug Allergies      Recent Labs   Lab Test  05/22/17   1405  10/26/16   1151  03/23/16   0837  07/31/15   1245   10/13/14   1335   08/09/12   0619   A1C  6.0  5.9  6.5*   --    < >  6.2*   < >   --    LDL  72   --   68   --    --   112   < >  90   HDL  39*   --   43   --    --    --    --   33*   TRIG  339*   --   188*   --    --    --    --   195*   ALT  12  16   --   18   < >  18   < >   --    CR  0.77  0.74  0.79  0.78   < >  0.82   < >   --    GFRESTIMATED  >90  Non  GFR Calc    >90  Non  GFR Calc    >90  Non  GFR Calc    >90  Non  GFR Calc     < >  >90  Non  GFR Calc     < >   --    GFRESTBLACK  >90   GFR  Calc    >90   GFR Calc    >90   GFR Calc    >90   GFR Calc     < >  >90   GFR Calc     < >   --    POTASSIUM  4.7  4.2  4.1  4.5   < >  4.4   < >   --    TSH  1.48  1.70   --    --    --   1.18   < >   --     < > = values in this interval not displayed.      BP Readings from Last 3 Encounters:   01/19/18 108/62   05/22/17 112/65   10/26/16 120/66    Wt Readings from Last 3 Encounters:   01/19/18 276 lb (125.2 kg)   05/22/17 276 lb (125.2 kg)   10/26/16 287 lb (130.2 kg)                  Labs reviewed in EPIC          Reviewed and updated as needed this visit by clinical staffTobacco  Allergies  Meds  Problems  Med Hx  Surg Hx  Fam Hx  Soc Hx        Reviewed and updated as needed this visit by Provider  Allergies  Meds  Problems         ROS:  C: NEGATIVE for fever, chills, change in weight  E/M: NEGATIVE for ear, mouth and throat problems  R: NEGATIVE for significant cough or SOB  CV: NEGATIVE for chest pain, palpitations or peripheral edema  ROS otherwise negative    OBJECTIVE:     /62 (BP Location: Right arm, Patient Position: Chair, Cuff Size: Adult Large)  Pulse 70  Temp 96.8  F (36  C) (Temporal)  Resp 20  Wt 276 lb (125.2 kg)  SpO2 99%  BMI 40.76 kg/m2  Body mass index is 40.76 kg/(m^2).  GENERAL: alert, no distress and obese  EYES: Eyes grossly normal to inspection, PERRL and conjunctivae and sclerae normal  HENT: ear canals and TM's normal, nose and mouth without ulcers or lesions  NECK: no adenopathy, no asymmetry, masses, or scars and thyroid normal to palpation  RESP: lungs clear to auscultation - no rales, rhonchi or wheezes  CV: regular rate and rhythm, normal S1 S2, no S3 or S4, no murmur, click or rub, no peripheral edema and peripheral pulses strong  ABDOMEN: soft, nontender, no hepatosplenomegaly, no masses and bowel sounds normal  NEURO: Dense left hemiparesis upper and lower extremities. Some proximal muscle  "control  PSYCH: mentation appears normal and affect flat    Diagnostic Test Results:  Results for orders placed or performed in visit on 01/19/18   Hemoglobin A1c   Result Value Ref Range    Hemoglobin A1C 6.1 (H) 4.3 - 6.0 %   Lipid panel reflex to direct LDL Fasting   Result Value Ref Range    Cholesterol 149 <200 mg/dL    Triglycerides 244 (H) <150 mg/dL    HDL Cholesterol 40 >39 mg/dL    LDL Cholesterol Calculated 60 <100 mg/dL    Non HDL Cholesterol 109 <130 mg/dL   Basic metabolic panel   Result Value Ref Range    Sodium 140 133 - 144 mmol/L    Potassium 4.4 3.4 - 5.3 mmol/L    Chloride 103 94 - 109 mmol/L    Carbon Dioxide 29 20 - 32 mmol/L    Anion Gap 8 3 - 14 mmol/L    Glucose 111 (H) 70 - 99 mg/dL    Urea Nitrogen 25 7 - 30 mg/dL    Creatinine 0.86 0.66 - 1.25 mg/dL    GFR Estimate 86 >60 mL/min/1.7m2    GFR Estimate If Black >90 >60 mL/min/1.7m2    Calcium 9.0 8.5 - 10.1 mg/dL       ASSESSMENT/PLAN:   2. Obesity, morbid, BMI 40.0-49.9 (H)  BMI:   Estimated body mass index is 40.76 kg/(m^2) as calculated from the following:    Height as of 5/22/17: 5' 9\" (1.753 m).    Weight as of this encounter: 276 lb (125.2 kg).   Weight management plan: Discussed healthy diet and exercise guidelines and patient will follow up in 3 months in clinic to re-evaluate.      1. Encounter to establish care with new doctor  Patient has been under the care of Dr. Duarte who retired. Patient requests transfer care to me.   Patient medications reconciled, PMH and Problem list reviewed and HM updated.      3. Type 2 diabetes mellitus with complication, with long-term current use of insulin (H)  Chronic controlled. A1C is at goal. BP at goal. Continues daily ASA. Currently on low dose statin. Will increase to high dose and recheck in 3-4 months.  - Albumin Random Urine Quantitative with Creat Ratio; Future  - Hemoglobin A1c  - Lipid panel reflex to direct LDL Fasting  - Basic metabolic panel  - LANTUS SOLOSTAR 100 UNIT/ML " soln; 15 units every morning and 25 units every HS  Dispense: 45 mL; Refill: 3    4. Mild major depression (H)  Chronic due to prior CVA. In remission. The current medical regimen is effective;  continue present plan and medications.  - DULoxetine (CYMBALTA) 30 MG EC capsule; TAKE 1 CAPSULE BY MOUTH ONCE  A DAY  Dispense: 180 capsule; Refill: 0    5. Peripheral vascular disease (H)  Chronic controlled. A1C is at goal. BP at goal. Continues daily ASA. Currently on low dose statin. Will increase to high dose and recheck in 3-4 months.    6. 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 A1C is at goal. BP at goal. Continues daily ASA. Currently on low dose statin. Will increase to high dose and recheck in 3-4 months. Continue with home PT/OT      FUTURE LABS:       - Schedule non-fasting labs in 6 months  FUTURE APPOINTMENTS:       - Follow-up visit in 3-4 months he will need admission exam for respite care   SELF MONITORING:       - Please check blood glucose readings daily       - Please check blood pressure readings daily  Work on weight loss  Regular exercise    Rosalino Melton MD  [facility]

## 2018-01-19 NOTE — NURSING NOTE
"Chief Complaint   Patient presents with     Establish Care       Initial /62 (BP Location: Right arm, Patient Position: Chair, Cuff Size: Adult Large)  Pulse 70  Temp 96.8  F (36  C) (Temporal)  Resp 20  Wt 276 lb (125.2 kg)  SpO2 99%  BMI 40.76 kg/m2 Estimated body mass index is 40.76 kg/(m^2) as calculated from the following:    Height as of 5/22/17: 5' 9\" (1.753 m).    Weight as of this encounter: 276 lb (125.2 kg).  Medication Reconciliation: complete     Kiah Garcia MA 1/19/2018  9:06 AM          "

## 2018-01-20 RX ORDER — SIMVASTATIN 40 MG
40 TABLET ORAL AT BEDTIME
Qty: 90 TABLET | Refills: 3 | Status: SHIPPED | OUTPATIENT
Start: 2018-01-20 | End: 2019-01-01

## 2018-01-23 DIAGNOSIS — E11.8 TYPE 2 DIABETES MELLITUS WITH COMPLICATION, WITH LONG-TERM CURRENT USE OF INSULIN (H): ICD-10-CM

## 2018-01-23 DIAGNOSIS — Z79.4 TYPE 2 DIABETES MELLITUS WITH COMPLICATION, WITH LONG-TERM CURRENT USE OF INSULIN (H): ICD-10-CM

## 2018-01-23 LAB
CREAT UR-MCNC: 50 MG/DL
MICROALBUMIN UR-MCNC: 24 MG/L
MICROALBUMIN/CREAT UR: 47.09 MG/G CR (ref 0–17)

## 2018-01-23 PROCEDURE — 82043 UR ALBUMIN QUANTITATIVE: CPT | Performed by: FAMILY MEDICINE

## 2018-01-24 NOTE — TELEPHONE ENCOUNTER
Senexon was prescribed 12/11/17 #90 with 1 refill. Refill not needed. Tracey De Jesus MA     1/24/2018

## 2018-02-01 DIAGNOSIS — I10 ESSENTIAL HYPERTENSION WITH GOAL BLOOD PRESSURE LESS THAN 140/90: ICD-10-CM

## 2018-02-01 DIAGNOSIS — E11.8 TYPE 2 DIABETES MELLITUS WITH COMPLICATION, WITH LONG-TERM CURRENT USE OF INSULIN (H): ICD-10-CM

## 2018-02-01 DIAGNOSIS — Z79.4 TYPE 2 DIABETES MELLITUS WITH DIABETIC NEUROPATHY, WITH LONG-TERM CURRENT USE OF INSULIN (H): ICD-10-CM

## 2018-02-01 DIAGNOSIS — F43.21 ADJUSTMENT DISORDER WITH DEPRESSED MOOD: ICD-10-CM

## 2018-02-01 DIAGNOSIS — I10 HTN, GOAL BELOW 140/90: ICD-10-CM

## 2018-02-01 DIAGNOSIS — K59.00 CONSTIPATION, UNSPECIFIED CONSTIPATION TYPE: ICD-10-CM

## 2018-02-01 DIAGNOSIS — Z79.4 TYPE 2 DIABETES MELLITUS WITH COMPLICATION, WITH LONG-TERM CURRENT USE OF INSULIN (H): ICD-10-CM

## 2018-02-01 DIAGNOSIS — E78.5 HYPERLIPIDEMIA LDL GOAL <100: ICD-10-CM

## 2018-02-01 DIAGNOSIS — E11.40 TYPE 2 DIABETES MELLITUS WITH DIABETIC NEUROPATHY, WITH LONG-TERM CURRENT USE OF INSULIN (H): ICD-10-CM

## 2018-02-01 RX ORDER — FUROSEMIDE 20 MG
20 TABLET ORAL DAILY
Qty: 30 TABLET | Refills: 5 | Status: CANCELLED | OUTPATIENT
Start: 2018-02-01

## 2018-02-01 RX ORDER — SENNA AND DOCUSATE SODIUM 50; 8.6 MG/1; MG/1
1 TABLET, FILM COATED ORAL DAILY
Qty: 90 TABLET | Refills: 1 | Status: CANCELLED | OUTPATIENT
Start: 2018-02-01

## 2018-02-01 RX ORDER — LISINOPRIL 20 MG/1
20 TABLET ORAL DAILY
Qty: 30 TABLET | Refills: 5 | Status: CANCELLED | OUTPATIENT
Start: 2018-02-01

## 2018-02-02 RX ORDER — DULOXETIN HYDROCHLORIDE 30 MG/1
CAPSULE, DELAYED RELEASE ORAL
Qty: 180 CAPSULE | Refills: 1 | Status: SHIPPED | OUTPATIENT
Start: 2018-02-02 | End: 2018-01-01

## 2018-02-02 RX ORDER — HYDROCHLOROTHIAZIDE 25 MG/1
25 TABLET ORAL DAILY
Qty: 90 TABLET | Refills: 1 | Status: SHIPPED | OUTPATIENT
Start: 2018-02-02 | End: 2018-01-01

## 2018-02-02 NOTE — TELEPHONE ENCOUNTER
RN reviewed all 9 medication requests. Looks like 5 of the 9 RXs patient should still have refills. RN called and spoke to enrike barrera. She said it looks like yes, patient has refills on those medications. He was requesting RX's to come through from [facility] so in the future when he needs refills, it will come to [facility]. These were all last refilled with Dr. PINEDA. Pharmacy said they would keep the RX's on profile. RN advised since he has refills, he will likely need to just use up those refills and when he is out, [facility] then can address refilling them.     RN kept the 4 medications pended that patient needs refills on.     The 5 medications that he has refills on were; Test strips (refilled 8/16/17 #100 with 3 refills), Senna (refilled 12/11/17 #90 with 1 refill), Lasix (refilled 10/9/17 #30 with 5 refills), Lisinopril (refilled 10/9/17 #30 with 5 refills), and Metformin (refilled 10/9/17 #60 with 5 refills). RN un pended these medications. If Dr. Melton would like to refill and have them on profile, please route back to RN and RN can repend them.     Will route to provider to advise.     Maty Reyna RN  [facility]

## 2018-02-02 NOTE — TELEPHONE ENCOUNTER
"Requested Prescriptions   Pending Prescriptions Disp Refills     blood glucose monitoring (NO BRAND SPECIFIED) test strip 100 strip 3     Sig: Use to test blood sugar daily or as  times daily or as directed.    Diabetic Supplies Protocol Passed    2/1/2018  2:22 PM       Passed - Patient is 18 years of age or older       Passed - Patient has had appt within past 6 mos    IV to PO - Antibiotics     None                    Sennosides-Docusate Sodium (SENNA-DOCUSATE SODIUM) 8.6-50 MG TABS 90 tablet 1     Sig: Take 1 tablet by mouth daily    There is no refill protocol information for this order        furosemide (LASIX) 20 MG tablet 30 tablet 5     Sig: Take 1 tablet (20 mg) by mouth daily    Diuretics (Including Combos) Protocol Passed    2/1/2018  2:22 PM       Passed - Blood pressure under 140/90    BP Readings from Last 3 Encounters:   01/19/18 108/62   05/22/17 112/65   10/26/16 120/66                Passed - Recent or future visit with authorizing provider's specialty    Patient had office visit in the last year or has a visit in the next 30 days with authorizing provider.  See \"Patient Info\" tab in inbasket, or \"Choose Columns\" in Meds & Orders section of the refill encounter.            Passed - Patient is age 18 or older       Passed - Normal serum creatinine on file in past 12 months    Recent Labs   Lab Test  01/19/18   0938   CR  0.86             Passed - Normal serum potassium on file in past 12 months    Recent Labs   Lab Test  01/19/18   0938   POTASSIUM  4.4                   Passed - Normal serum sodium on file in past 12 months    Recent Labs   Lab Test  01/19/18   0938   NA  140              lisinopril (PRINIVIL/ZESTRIL) 20 MG tablet 30 tablet 5     Sig: Take 1 tablet (20 mg) by mouth daily    ACE Inhibitors (Including Combos) Protocol Passed    2/1/2018  2:22 PM       Passed - Blood pressure under 140/90    BP Readings from Last 3 Encounters:   01/19/18 108/62   05/22/17 112/65   10/26/16 120/66 " "               Passed - Recent or future visit with authorizing provider's specialty    Patient had office visit in the last year or has a visit in the next 30 days with authorizing provider.  See \"Patient Info\" tab in inbasket, or \"Choose Columns\" in Meds & Orders section of the refill encounter.            Passed - Patient is age 18 or older       Passed - Normal serum creatinine on file in past 12 months    Recent Labs   Lab Test  01/19/18   0938   CR  0.86            Passed - Normal serum potassium on file in past 12 months    Recent Labs   Lab Test  01/19/18   0938   POTASSIUM  4.4             metFORMIN (GLUCOPHAGE) 1000 MG tablet 60 tablet 5    Biguanide Agents Passed    2/1/2018  2:22 PM       Passed - Patient's BP is less than 140/90    BP Readings from Last 3 Encounters:   01/19/18 108/62   05/22/17 112/65   10/26/16 120/66                Passed - Patient has documented LDL within the past 12 mos.    Recent Labs   Lab Test  01/19/18   0938   LDL  60            Passed - Patient has had a Microalbumin in the past 12 mos.    Recent Labs   Lab Test  01/23/18   0928   MICROL  24   UMALCR  47.09*            Passed - Patient is age 10 or older       Passed - Patient has documented A1c within the specified period of time.    Recent Labs   Lab Test  01/19/18   0938   A1C  6.1*            Passed - Patient's CR is NOT>1.4 OR Patient's EGFR is NOT<45 within past 12 mos.    Recent Labs   Lab Test  01/19/18   0938   GFRESTIMATED  86   GFRESTBLACK  >90       Recent Labs   Lab Test  01/19/18   0938   CR  0.86            Passed - Patient does NOT have a diagnosis of CHF.       Passed - Recent (6 mos) or future visit with authorizing provider's specialty    Patient had office visit in the last 6 months or has a visit in the next 30 days with authorizing provider.  See \"Patient Info\" tab in inbasket, or \"Choose Columns\" in Meds & Orders section of the refill encounter.            aspirin  MG EC tablet 100 tablet 1     " "Sig: Take 1 tablet (325 mg) by mouth daily    Analgesics (Non-Narcotic Tylenol and ASA Only) Passed    2018  2:22 PM       Passed - Recent or future visit with authorizing provider's specialty    Patient had office visit in the last year or has a visit in the next 30 days with authorizing provider.  See \"Patient Info\" tab in inbasket, or \"Choose Columns\" in Meds & Orders section of the refill encounter.            Passed - Patient is age 20 years or older    If ASA is flagged for ages under 20 years old. Forward to provider for confirmation Ryes Syndrome is not a concern.              DULoxetine (CYMBALTA) 30 MG EC capsule 180 capsule 0     Sig: TAKE 1 CAPSULE BY MOUTH ONCE  A DAY    Serotonin-Norepinephrine Reuptake Inhibitors  Passed    2018  2:22 PM       Passed - Blood pressure under 140/90    BP Readings from Last 3 Encounters:   18 108/62   17 112/65   10/26/16 120/66                Passed - PHQ-9 score of less than 5 in past 6 months    The PHQ-9 criteria is meant to fail. It requires a PHQ-9 score review         Passed - Recent or future visit with authorizing provider's specialty    Patient had office visit in the last year or has a visit in the next 30 days with authorizing provider.  See \"Patient Info\" tab in inbasket, or \"Choose Columns\" in Meds & Orders section of the refill encounter.            Passed - Patient is age 18 or older       Passed - Recent (6 mo) or future visit with authorizing provider's specialty    Patient had office visit in the last 6 months or has a visit in the next 30 days with authorizing provider.  See \"Patient Info\" tab in inbasket, or \"Choose Columns\" in Meds & Orders section of the refill encounter.            insulin pen needle (NOVOFINE) 30G X 8  each 6     Si Device 2 times daily    Diabetic Supplies Protocol Passed    2018  2:22 PM       Passed - Patient is 18 years of age or older       Passed - Patient has had appt within past 6 mos    " "IV to PO - Antibiotics     None                    hydrochlorothiazide (HYDRODIURIL) 25 MG tablet 90 tablet 0     Sig: Take 1 tablet (25 mg) by mouth daily    Diuretics (Including Combos) Protocol Passed    2/1/2018  2:22 PM       Passed - Blood pressure under 140/90    BP Readings from Last 3 Encounters:   01/19/18 108/62   05/22/17 112/65   10/26/16 120/66                Passed - Recent or future visit with authorizing provider's specialty    Patient had office visit in the last year or has a visit in the next 30 days with authorizing provider.  See \"Patient Info\" tab in inbasket, or \"Choose Columns\" in Meds & Orders section of the refill encounter.            Passed - Patient is age 18 or older       Passed - Normal serum creatinine on file in past 12 months    Recent Labs   Lab Test  01/19/18   0938   CR  0.86             Passed - Normal serum potassium on file in past 12 months    Recent Labs   Lab Test  01/19/18   0938   POTASSIUM  4.4                   Passed - Normal serum sodium on file in past 12 months    Recent Labs   Lab Test  01/19/18   0938   NA  140              "

## 2018-02-19 ENCOUNTER — CARE COORDINATION (OUTPATIENT)
Dept: GERIATRIC MEDICINE | Facility: CLINIC | Age: 78
End: 2018-02-19

## 2018-02-19 NOTE — PROGRESS NOTES
2/19/2018  CM received a VM message from members wife stating she would like her respite started on June 5th, 2018.    Jossy Cortes RN-[facility]   [phone]

## 2018-02-19 NOTE — PROGRESS NOTES
2/19/2018  JENNIFER received a call from Suzanna [facility].  Suzanna stated she visited member this a.m. And would like JENNIFER to arrange a SNF stay following a knee replacement he has scheduled for next week. Member would prefer to go to [facility] and 2nd choice will be [facility]. CM updated Suzanna that the d/c planners at the hospital will arrange that during his hospital stay as we will not know if they have an open bed until that date.    Jossy Cortes RN-[facility]   [phone]

## 2018-03-06 ENCOUNTER — CARE COORDINATION (OUTPATIENT)
Dept: GERIATRIC MEDICINE | Facility: CLINIC | Age: 78
End: 2018-03-06

## 2018-03-06 NOTE — PROGRESS NOTES
3/6/2018  CM received notification that member has lost his Right Hearing Aid Sometime ago.    Awaiting cost for replacement and a BE will be submitted to [employer] when a quote is received.    Members spouse stated members Left hearing aid has not been working and she will bring it into the hearing aid store to have it adjusted.    Jossy Cortes RN-[facility]   [phone]

## 2018-03-19 ENCOUNTER — CARE COORDINATION (OUTPATIENT)
Dept: GERIATRIC MEDICINE | Facility: CLINIC | Age: 78
End: 2018-03-19

## 2018-03-19 NOTE — PROGRESS NOTES
3/19/2018  CM received a VM message from member's wife, Mattie. Mattie stated that members CPAP is not working. She went to the clinic and obtained a loaner machine.  Member has an appt for next month and is scheduled for a replacement machine.    No additional f/u needed at this time.    Jossy Cortes RN-[facility]   [phone]

## 2018-03-20 ENCOUNTER — TELEPHONE (OUTPATIENT)
Dept: CARE COORDINATION | Facility: CLINIC | Age: 78
End: 2018-03-20

## 2018-03-29 ENCOUNTER — PATIENT OUTREACH (OUTPATIENT)
Dept: GERIATRIC MEDICINE | Facility: CLINIC | Age: 78
End: 2018-03-29

## 2018-03-29 NOTE — PROGRESS NOTES
[facility] Care Coordination Contact  Arranged transportation thru [facility] provider Ania (phone: [phone]) for the below appt:  Appt Date & Time: 4/19 @ 2:45  Clinic Name & Address:  [facility]  Transportation Provider: Ania   time:  2:15     Notified Mattie of  time.    Danelle Gant  Case Management Specialist   [facility]   [phone]

## 2018-04-10 DIAGNOSIS — E11.8 TYPE 2 DIABETES MELLITUS WITH COMPLICATION, WITH LONG-TERM CURRENT USE OF INSULIN (H): ICD-10-CM

## 2018-04-10 DIAGNOSIS — I10 HTN, GOAL BELOW 140/90: ICD-10-CM

## 2018-04-10 DIAGNOSIS — Z79.4 TYPE 2 DIABETES MELLITUS WITH COMPLICATION, WITH LONG-TERM CURRENT USE OF INSULIN (H): ICD-10-CM

## 2018-04-10 RX ORDER — FUROSEMIDE 20 MG
TABLET ORAL
Qty: 30 TABLET | Refills: 5 | Status: SHIPPED | OUTPATIENT
Start: 2018-04-10 | End: 2018-01-01

## 2018-04-10 RX ORDER — LISINOPRIL 20 MG/1
TABLET ORAL
Qty: 30 TABLET | Refills: 8 | Status: SHIPPED | OUTPATIENT
Start: 2018-04-10 | End: 2018-01-01

## 2018-04-10 NOTE — TELEPHONE ENCOUNTER
Routing refill request to provider for review/approval because:  Labs out of range (metformin):  microalbumin     Prescription approved per RN refill protocol- lisinopril/lasix  Demetra Rios, RN, BSN

## 2018-04-10 NOTE — TELEPHONE ENCOUNTER
"Requested Prescriptions   Pending Prescriptions Disp Refills     lisinopril (PRINIVIL/ZESTRIL) 20 MG tablet [Pharmacy Med Name: LISINOPRIL 20MG TAB] 30 tablet      Sig: TAKE 1 TABLET BY MOUTH ONCE DAILY    ACE Inhibitors (Including Combos) Protocol Passed    4/10/2018 10:20 AM       Passed - Blood pressure under 140/90 in past 12 months    BP Readings from Last 3 Encounters:   01/19/18 108/62   05/22/17 112/65   10/26/16 120/66                Passed - Recent (12 mo) or future (30 days) visit within the authorizing provider's specialty    Patient had office visit in the last 12 months or has a visit in the next 30 days with authorizing provider or within the authorizing provider's specialty.  See \"Patient Info\" tab in inbasket, or \"Choose Columns\" in Meds & Orders section of the refill encounter.           Passed - Patient is age 18 or older       Passed - Normal serum creatinine on file in past 12 months    Recent Labs   Lab Test  01/19/18   0938   CR  0.86            Passed - Normal serum potassium on file in past 12 months    Recent Labs   Lab Test  01/19/18   0938   POTASSIUM  4.4             metFORMIN (GLUCOPHAGE) 1000 MG tablet [Pharmacy Med Name: METFORMIN 1000MG TAB] 60 tablet      Sig: TAKE 1 TABLET BY MOUTH TWICE A DAY WITH MEALS    Biguanide Agents Passed    4/10/2018 10:20 AM       Passed - Blood pressure less than 140/90 in past 6 months    BP Readings from Last 3 Encounters:   01/19/18 108/62   05/22/17 112/65   10/26/16 120/66                Passed - Patient has documented LDL within the past 12 mos.    Recent Labs   Lab Test  01/19/18   0938   LDL  60            Passed - Patient has had a Microalbumin in the past 12 mos.    Recent Labs   Lab Test  01/23/18   0928   MICROL  24   UMALCR  47.09*            Passed - Patient is age 10 or older       Passed - Patient has documented A1c within the specified period of time.    Recent Labs   Lab Test  01/19/18   0938   A1C  6.1*            Passed - Patient's " "CR is NOT>1.4 OR Patient's EGFR is NOT<45 within past 12 mos.    Recent Labs   Lab Test  01/19/18   0938   GFRESTIMATED  86   GFRESTBLACK  >90       Recent Labs   Lab Test  01/19/18   0938   CR  0.86            Passed - Patient does NOT have a diagnosis of CHF.       Passed - Recent (6 mo) or future (30 days) visit within the authorizing provider's specialty    Patient had office visit in the last 6 months or has a visit in the next 30 days with authorizing provider or within the authorizing provider's specialty.  See \"Patient Info\" tab in inbasket, or \"Choose Columns\" in Meds & Orders section of the refill encounter.            furosemide (LASIX) 20 MG tablet [Pharmacy Med Name: FUROSEMIDE 20MG TAB] 30 tablet      Sig: TAKE 1 TABLET BY MOUTH ONCE DAILY    Diuretics (Including Combos) Protocol Passed    4/10/2018 10:20 AM       Passed - Blood pressure under 140/90 in past 12 months    BP Readings from Last 3 Encounters:   01/19/18 108/62   05/22/17 112/65   10/26/16 120/66                Passed - Recent (12 mo) or future (30 days) visit within the authorizing provider's specialty    Patient had office visit in the last 12 months or has a visit in the next 30 days with authorizing provider or within the authorizing provider's specialty.  See \"Patient Info\" tab in inbasket, or \"Choose Columns\" in Meds & Orders section of the refill encounter.           Passed - Patient is age 18 or older       Passed - Normal serum creatinine on file in past 12 months    Recent Labs   Lab Test  01/19/18   0938   CR  0.86             Passed - Normal serum potassium on file in past 12 months    Recent Labs   Lab Test  01/19/18   0938   POTASSIUM  4.4                   Passed - Normal serum sodium on file in past 12 months    Recent Labs   Lab Test  01/19/18   0938   NA  140                Last Written Prescription Date:  10/9/17  Last Fill Quantity: 30,  # refills: 5   Last Office Visit with [facility] or [facility] prescribing provider:  " 1/19/18   Future Office Visit:     Metformin 1000 MG       Last Written Prescription Date:  10/9/17  Last Fill Quantity: 60,   # refills: 5  Last Office Visit: 1/19/18  Future Office visit:         Lasix       Last Written Prescription Date:  10/9/17  Last Fill Quantity: 30,   # refills: 5  Last Office Visit: 1/19/18  Future Office visit:

## 2018-04-16 ENCOUNTER — PATIENT OUTREACH (OUTPATIENT)
Dept: GERIATRIC MEDICINE | Facility: CLINIC | Age: 78
End: 2018-04-16

## 2018-04-16 NOTE — PROGRESS NOTES
[facility] Care Coordination Contact  CM contacted members spouse, Mattie, regarding  replacement hearing aid that is needed. Quote was received and after discussion with CM superviosr, BE was completed.  However, Mattie stated that the hearing aid replacement was covered by insurance.       JENNIFER had completed a BE and intended to submit to  Medical Director.  CM will not submit at this time per Mattie stating the cost was covered by insurance.    Jossy Cortes RN-[facility]   [phone]

## 2018-04-18 NOTE — NURSING NOTE
"Chief Complaint   Patient presents with     Sleep Problem     MIO, need supplies, has not seen a provider for 5+ years     Consult       Initial There were no vitals taken for this visit. Estimated body mass index is 40.76 kg/(m^2) as calculated from the following:    Height as of 5/22/17: 1.753 m (5' 9\").    Weight as of 1/19/18: 125.2 kg (276 lb).  Medication Reconciliation: complete    Stop bang=5  Neck circumference: 50 cm  Patient unable to stand wheelchair  "

## 2018-04-18 NOTE — PROGRESS NOTES
Sleep Consultation:    Date on this visit: 4/19/2018    Derek Aragon is sent by No ref. provider found for a sleep consultation regarding his severe MIO.    Primary Physician: Rosalino Melton     Derek Aragon was diagnosd with severe MIO 5 years ago AHI 63 and started on AUTO CPAP 10 to 15cm.  Even though his compliance is at 100% 14 hours avg use, he is still experiencing EDS.  Patient is wheel chair bound with his wife attending to many of his needs.  His CPAP metrics show 95% pressure requirement 13.7 with AHI 15.2 and high leak at 56.8 L.  His mask slides up during the night causing the leak.      Allergies:    Allergies   Allergen Reactions     No Known Drug Allergies        Medications:    Current Outpatient Prescriptions   Medication Sig Dispense Refill     acyclovir (ZOVIRAX) 5 % cream Apply topically 5 times daily 5 g 3     albuterol (2.5 MG/3ML) 0.083% nebulizer solution NEBULIZE 1 VIAL BY MOUTH EV NANCY 6 HOURS AS NEEDED FOR W HEEZING / SHORTNESS OF DREW TH /DYSPNEA 90 mL 3     aspirin  MG EC tablet Take 1 tablet (325 mg) by mouth daily 100 tablet 1     blood glucose monitoring (MICHELLE MICROLET) lancets Use to test blood sugar 4 times daily or as directed. 3 Box 3     blood glucose monitoring (NO BRAND SPECIFIED) test strip Use to test blood sugar daily or as  times daily or as directed. 100 strip 3     docusate sodium (COLACE) 100 MG tablet Take 100 mg by mouth daily 60 tablet 3     DULoxetine (CYMBALTA) 30 MG EC capsule TAKE 1 CAPSULE BY MOUTH ONCE  A  capsule 1     furosemide (LASIX) 20 MG tablet TAKE 1 TABLET BY MOUTH ONCE DAILY 30 tablet 5     hydrochlorothiazide (HYDRODIURIL) 25 MG tablet Take 1 tablet (25 mg) by mouth daily 90 tablet 1     insulin pen needle (NOVOFINE) 30G X 8 MM 1 Device 2 times daily 100 each 6     LANTUS SOLOSTAR 100 UNIT/ML soln 15 units every morning and 25 units every HS 45 mL 3     lidocaine (LIDODERM) 5 % patch Place 1 patch onto the skin daily as needed To  back PRN (Patient not taking: Reported on 1/19/2018) 30 patch      lisinopril (PRINIVIL/ZESTRIL) 20 MG tablet TAKE 1 TABLET BY MOUTH ONCE DAILY 30 tablet 8     loratadine (ALLERGY RELIEF) 10 MG tablet Take 1 tablet (10 mg) by mouth daily as needed for allergies BRAND NAME IS EQUATE 30 tablet 0     metFORMIN (GLUCOPHAGE) 1000 MG tablet TAKE 1 TABLET BY MOUTH TWICE A DAY WITH MEALS 60 tablet 3     Miconazole Nitrate POWD powder Apply topically daily as needed 2 Bottle 1     Nystatin (NYSTOP) 784791 UNIT/GM POWD APPLY TO BUTTOCKS; GROIN; AND PERINEUM 3 TIMES A DAY AS NEEDED. 30 g 3     ORDER FOR DME, SET TO LOCAL PRINT, Equipment being ordered: CPAP at previous home settings       rOPINIRole (REQUIP) 0.25 MG tablet Take 1 tablet (0.25 mg) by mouth At Bedtime 90 tablet 1     Sennosides-Docusate Sodium (SENNA-DOCUSATE SODIUM) 8.6-50 MG TABS TAKE 1 TABLET BY MOUTH EVERY DAY 90 tablet 1     simvastatin (ZOCOR) 40 MG tablet Take 1 tablet (40 mg) by mouth At Bedtime 90 tablet 3       Problem List:  Patient Active Problem List    Diagnosis Date Noted     Chest pain 01/22/2013     Priority: High     Problem list name updated by automated process. Provider to review and confirm  [facility] Update utility       Anal fissure 12/21/2012     Priority: High     Acute ischemic stroke (H) 02/25/2012     Priority: High     Type 2 diabetes mellitus with complication, with long-term current use of insulin (H) 10/26/2016     Priority: Medium     Adjustment disorder with depressed mood 08/15/2016     Priority: Medium     Type 2 diabetes mellitus with diabetic neuropathy (H) 11/30/2015     Priority: Medium     Obesity, morbid, BMI 40.0-49.9 (H) 10/27/2015     Priority: Medium     Pain of left lower leg 10/20/2015     Priority: Medium     Yeast dermatitis 06/19/2015     Priority: Medium     Generalized muscle weakness 06/02/2015     Priority: Medium     Pyuria 06/02/2015     Priority: Medium     Other specified disease of nail 08/13/2014      "Priority: Medium     Dermatophytosis of nail 2014     Priority: Medium     Anemia 2013     Priority: Medium     Acute renal failure (H) 2013     Priority: Medium     Essential hypertension with goal blood pressure less than 140/90 2012     Priority: Medium     Health Care Home 2012     Priority: Medium     [facility]    Jossy Cortes RN  [phone]  [facility] CARE PLAN SUMMARY    Client Name:  Derek Aragon  Address:  [address] [place] [address] Phone: [phone] (home)    :  1940 Date of Assessment:  2017   Health Plan:  [employer] [facility]  Health Plan Number: [id number] Medical Assistance Number: [id number]  Financial Worker:  Anastasiia ZIMMER [phone]  Case #:  [id number]   [facility] :  Jossy Cortes RN  Phone:  [phone]   Fax:  [phone]   [facility] Enrollment Date: 2012 Case Mix:  J  Rate Cell:  B  Waiver Type:  EW   Emergency Contact: TAYE ARAGON  Address: [address] [place] [address]   Home Phone: [phone]  Mobile Phone: [phone]  Relation: Spouse    Secondary Emergency Contact: KAZLORETTA  Home Phone: [phone]  Mobile Phone: [phone]  Relation: Daughter Language:  English  :  No   Health Care Agent/POA:  No Advanced Directives/Living Will:  POLST on file with [facility]   Primary Care Clinic/Phone/Fax:  [facility]/(p) [phone], (f) [phone] Primary Dx: Diabetes E11.59  Secondary Dx: Hemiplegia G81.90   Primary Physician:  Rosalino Melton   Height:  5' 9\"   Weight:  276 lbs   Specialty Physician:    Audiologist:     Eye Care Provider:   Dental Care Provider:    [facility]: [facility] [phone]   Other:          [facility] CURRENT SERVICES SUMMARY  Equipment owned/DME history: [place]: Sit to stand lift, hospital bed and trapeeze, rolling commode   [place] mobility: shower bench with sliding seat reliable medical: wheelchair, exercise pedals    SERVICE " TYPE/PROVIDER NAME/PHONE AUTH DATE FREQUENCY Units OR $ Amt DESCRIPTION   Medical Transportation: [facility] [phone] 7/1/2017 - 6/30/2018 as needed Varies    PCA: [facility] [phone]  Fax: [phone] 7/6/2017 -   7/5/2018 daily $5,728.07 /Mo    $30.08/Mo 11hrs/day    Supervision - Per agency POC   [facility] [phone]  Fax:  7/1/2017 - 6/30/2018 2 Visits per Month  Medicare    Personal Emergency Response: [facility] [phone]  Fax:  7/1/2017 - 6/30/2018 daily $39.00/Mo Standard Unit    Supplies: [facility] [phone]  Fax:  7/1/2017 - 6/30/2018 monthly $31.92/Mo Depends, personal wipes  Monthly delivery     DME: [facility] [employer] [phone]  Fax:  7/1/2017 - 6/30/2018 1x Purchase $224 [employer] Lift and Electric Bed Maintenance Visit               * For ADC please select ADC provider and [employer] in order to process auth            DX V65.8 REPLACED WITH [id number] HEALTH CARE HOME (04/08/2013)                                             History of CVA (cerebrovascular accident) wit left hemiparesis 03/22/2012     Priority: Medium     Ischemic etiology       Back pain 03/22/2012     Priority: Medium     Insomnia 03/14/2012     Priority: Medium     DM circ dis type II, uncontrolled (ischemic CVA 2/25/12) 03/04/2012     Priority: Medium     Peripheral vascular disease (H) 03/04/2012     Priority: Medium     Problem list name updated by automated process. Provider to review       Hemiparesis - left 02/27/2012     Priority: Medium     Anxiety state 07/01/2002     Priority: Medium     Problem list name updated by automated process. Provider to review       DVT prophylaxis 01/22/2013     Priority: Low     Advance Care Planning 12/19/2012     Priority: Low       Advance Care Planning 7/14/2016 / Reviewed on 7/12/17: Receipt of ACP document:  Received: Health Care Directive which was witnessed or notarized  on 90.  Document previously scanned on 6-5-15.  Code Status reflects choices in most recent ACP document.  Confirmed/documented designated decision maker(s).  Added by Jossy Cortes       Advance Care Planning 2015: Receipt of ACP document:  Received: Health Care Directive which was witnessed or notarized on 90.  Document previously scanned on 6-5-15.  Validation form completed and sent to be scanned.  Code Status reflects choices in most recent ACP document.  Confirmed/documented designated decision maker(s).  Added by Cassandra Hunter RN, System ACP Coordinator Honoring Choices   Advance Care Plannin/3/14  Receipt of ACP document:  Received: Resuscitation Guidelines order which was witnessed and signed by provider on 6-10-14.  Document previously scanned on 6-10-14. Also in receipt of POLST signed and scanned on 3-1-12. Orders reviewed and found to be valid.  Code Status reflects choices in most recent ACP document.  Confirmed/documented designated decision maker(s). See permanent comments section of demographics in clinical tab. View document(s) and details by clicking on code status.  Added by Cassandra Hunter RN, System ACP Coordinator Honoring Choices  on 12/3/2014.               Constipation 2012     Priority: Low     Morbid obesity (H) 2012     Priority: Low     Mild major depression (H) 2012     Priority: Low     MIO (obstructive sleep apnea) 2012     Priority: Low     Restless leg syndrome 2012     Priority: Low     Hyperlipidemia LDL goal <100 10/31/2010     Priority: Low        Past Medical/Surgical History:  Past Medical History:   Diagnosis Date     Anxiety state, unspecified     Mixed anxiety/depression     Diabetic eye exam (H) 10/10/12     Impotence of organic origin      Obesity, morbid, BMI 40.0-49.9 (H) 10/27/2015     Obesity, unspecified      Pure hypercholesterolemia      Sleep disturbance, unspecified     Sleep apnea     Type II or unspecified type  diabetes mellitus without mention of complication, not stated as uncontrolled      Unspecified cerebral artery occlusion with cerebral infarction      Unspecified essential hypertension      Past Surgical History:   Procedure Laterality Date     C OPEN RX FEMUR FX+INTRAMED EDE Left     2016     COLONOSCOPY  2012    Procedure: COLONOSCOPY;  colonoscopy;  Surgeon: Chris Crockett MD;  Location:  GI     ENT SURGERY      Parotid gland tumor removal     HC REMOVAL GALLBLADDER      Cholecystectomy       Social History:  Social History     Social History     Marital status:      Spouse name: Mattie     Number of children: 7     Years of education: N/A     Occupational History     Not on file.     Social History Main Topics     Smoking status: Former Smoker     Packs/day: 1.00     Years: 20.00     Types: Cigarettes     Quit date: 1985     Smokeless tobacco: Never Used     Alcohol use No     Drug use: No     Sexual activity: Not Currently     Other Topics Concern     Parent/Sibling W/ Cabg, Mi Or Angioplasty Before 65f 55m? No     Social History Narrative       Family History:  Family History   Problem Relation Age of Onset     Cardiovascular Mother      MI age 75 yrs     DIABETES Mother      Obesity Mother      CEREBROVASCULAR DISEASE Father      anneurysm age 52      Obesity Maternal Grandmother      Psychotic Disorder Maternal Grandmother      suicide     Psychotic Disorder Maternal Grandfather      suicide     Lipids Sister        Review of Systems:  A complete review of systems reviewed by me is negative with the exeption of what has been mentioned in the history of present illness.  CONSTITUTIONAL: NEGATIVE for weight gain/loss, fever, chills, sweats or night sweats, drug allergies.  EYES: NEGATIVE for changes in vision, blind spots, double vision.  ENT: NEGATIVE for ear pain, sore throat, sinus pain, post-nasal drip, runny nose, bloody nose  CARDIAC: NEGATIVE for fast heartbeats or  fluttering in chest, chest pain or pressure, breathlessness when lying flat, swollen legs or swollen feet.  NEUROLOGIC: NEGATIVE headaches, weakness or numbness in the arms or legs.  DERMATOLOGIC: NEGATIVE for rashes, new moles or change in mole(s)  PULMONARY: NEGATIVE SOB at rest, SOB with activity, dry cough, productive cough, coughing up blood, wheezing or whistling when breathing.    GASTROINTESTINAL: NEGATIVE for nausea or vomitting, loose or watery stools, fat or grease in stools, constipation, abdominal pain, bowel movements black in color or blood noted.  GENITOURINARY: NEGATIVE for pain during urination, blood in urine, urinating more frequently than usual, irregular menstrual periods.  MUSCULOSKELETAL: NEGATIVE for muscle pain, bone or joint pain, swollen joints.  ENDOCRINE: NEGATIVE for increased thirst or urination, diabetes.  LYMPHATIC: NEGATIVE for swollen lymph nodes, lumps or bumps in the breasts or nipple discharge.    Physical Examination:  Vitals: /55  Pulse 73  SpO2 96%  BMI= There is no height or weight on file to calculate BMI.  Pt unable to stand for Ht or Wt. Wheelchair   Stop bang=5  Neck circumference: 50 cm      No flowsheet data found.    GENERAL APPEARANCE: healthy, alert and no distress  EYES: Eyes grossly normal to inspection, PERRL and conjunctivae and sclerae normal  HENT: ear canals and TM's normal and nose and mouth without ulcers or lesions  NECK: no adenopathy, no asymmetry, masses, or scars and thyroid normal to palpation  NEURO: Normal strength and tone, mentation intact and speech normal  Mallampati Class: III.  Tonsillar Stage: 1  hidden by pillars.    Impression/Plan:    The patient will be getting new CPAP device and new type of full face mask to try and reduce the leak.  Literature provided regarding sleep apnea.      He will follow up with me in approximately two weeks after his sleep study has been competed to review the results and discuss plan of care.        Polysomnography reviewed.  Obstructive sleep apnea reviewed.  Complications of untreated sleep apnea were reviewed.  Your BMI is There is no height or weight on file to calculate BMI.    What is BMI?  Body mass index (BMI) is one way to tell whether you are at a healthy weight, overweight, or obese. It measures your weight in relation to your height.  A BMI of 18.5 to 24.9 is in the healthy range. A person with a BMI of 25 to 29.9 is considered overweight, and someone with a BMI of 30 or greater is considered obese.  Another way to find out if you are at risk for health problems caused by overweight and obesity is to measure your waist. If you are a woman and your waist is more than 35 inches, or if you are a man and your waist is more than 40 inches, your risk of disease may be higher.  More than two-thirds of American adults are considered overweight or obese. Being overweight or obese increases the risk for further weight gain.  Excess weight may lead to heart disease and diabetes. Creating and following plans for healthy eating and physical activity may help you improve your health.    Methods for maintaining or losing weight.  Weight control is part of healthy lifestyle and includes exercise, emotional health, and healthy eating habits.  Careful eating habits lifelong is the mainstay of weight control.  Though there are significant health benefits from weight loss, long-term weight loss with diet alone may be very difficult to achieve- studies show long-term success with dietary management in less than 10% of people. Attaining a healthy weight may be especially difficult to achieve in those with severe obesity. In some cases, medications, devices and surgical management might be considered.    What can you do?  If you are overweight or obese and are interested in methods for weight loss, you should discuss this with your provider. In addition, we recommend that you review healthy life styles and methods for  weight loss available through the National Institutes of Health patient information sites:   http://win.niddk.nih.gov/publications/index.htm        The patient will return in 2 months.    CC: No ref. provider found

## 2018-04-19 ENCOUNTER — OFFICE VISIT (OUTPATIENT)
Dept: SLEEP MEDICINE | Facility: CLINIC | Age: 78
End: 2018-04-19
Payer: COMMERCIAL

## 2018-04-19 ENCOUNTER — DOCUMENTATION ONLY (OUTPATIENT)
Dept: SLEEP MEDICINE | Facility: CLINIC | Age: 78
End: 2018-04-19
Payer: COMMERCIAL

## 2018-04-19 VITALS — SYSTOLIC BLOOD PRESSURE: 108 MMHG | HEART RATE: 73 BPM | OXYGEN SATURATION: 96 % | DIASTOLIC BLOOD PRESSURE: 55 MMHG

## 2018-04-19 DIAGNOSIS — G47.33 OSA (OBSTRUCTIVE SLEEP APNEA): Primary | ICD-10-CM

## 2018-04-19 PROCEDURE — 99213 OFFICE O/P EST LOW 20 MIN: CPT | Performed by: OTOLARYNGOLOGY

## 2018-04-19 NOTE — MR AVS SNAPSHOT
After Visit Summary   4/19/2018    Derek Aragon    MRN: [id number]           Patient Information     Date Of Birth          1940        Visit Information        Provider Department      4/19/2018 2:45 PM Mike Santamaria MD [facility]        Today's Diagnoses     MIO (obstructive sleep apnea)    -  1       Follow-ups after your visit        Your next 10 appointments already scheduled     Apr 19, 2018  3:15 PM CDT   DME RETURN with  SLEEP CENTER DME   [facility] ([facility])    [address] [address] [phone]            Jun 01, 2018 11:20 AM CDT   Office Visit with Rosalino Melton MD   [facility]    [address] [facility] [address] [phone]           Bring a current list of meds and any records pertaining to this visit. For Physicals, please bring immunization records and any forms needing to be filled out. Please arrive 10 minutes early to complete paperwork.              Who to contact     If you have questions or need follow up information about today's clinic visit or your schedule please contact [facility] directly at [phone].  Normal or non-critical lab and imaging results will be communicated to you by MyChart, letter or phone within 4 business days after the clinic has received the results. If you do not hear from us within 7 days, please contact the clinic through [email or web address] or phone. If you have a critical or abnormal lab result, we will notify you by phone as soon as possible.  Submit refill requests through [employer] or call your pharmacy and they will forward the refill request to us. Please allow 3 business days for your refill to be completed.          Additional Information About Your Visit        [email or web address] Information     [employer] gives you secure access to your electronic health record. If you see a primary care  provider, you can also send messages to your care team and make appointments. If you have questions, please call your primary care clinic.  If you do not have a primary care provider, please call [phone] and they will assist you.        Care EveryWhere ID     This is your Care EveryWhere ID. This could be used by other organizations to access your [facility] medical records  [id number]        Your Vitals Were     Pulse Pulse Oximetry                73 96%           Blood Pressure from Last 3 Encounters:   04/19/18 108/55   01/19/18 108/62   05/22/17 112/65    Weight from Last 3 Encounters:   01/19/18 125.2 kg (276 lb)   05/22/17 125.2 kg (276 lb)   10/26/16 130.2 kg (287 lb)              Today, you had the following     No orders found for display       Primary Care Provider Office Phone # Fax #    Rosalino Melton [phone]       [address] [facility] [address]        Equal Access to Services     CASSIA HAWKINS : Hadii aad ku hadasho Soomaali, waaxda luqadaha, qaybta kaalmada adeegyada, evi trujillo . So [facility] [phone].    ATENCIÓN: Si habla español, tiene a castro disposición servicios gratuitos de asistencia lingüística. Llame al [phone].    We comply with applicable federal civil rights laws and [place] laws. We do not discriminate on the basis of race, color, national origin, age, disability, sex, sexual orientation, or gender identity.            Thank you!     Thank you for choosing [facility]  for your care. Our goal is always to provide you with excellent care. Hearing back from our patients is one way we can continue to improve our services. Please take a few minutes to complete the written survey that you may receive in the mail after your visit with us. Thank you!             Your Updated Medication List - Protect others around you: Learn how to safely use, store and throw away your medicines at www.disposemymeds.org.           This list is accurate as of 4/19/18  3:02 PM.  Always use your most recent med list.                   Brand Name Dispense Instructions for use Diagnosis    acyclovir 5 % cream    ZOVIRAX    5 g    Apply topically 5 times daily    HSV (herpes simplex virus) infection       albuterol (2.5 MG/3ML) 0.083% neb solution     90 mL    NEBULIZE 1 VIAL BY MOUTH EV NANCY 6 HOURS AS NEEDED FOR W HEEZING / SHORTNESS OF DREW TH /DYSPNEA    Chest congestion       ALLERGY RELIEF 10 MG tablet   Generic drug:  loratadine     30 tablet    Take 1 tablet (10 mg) by mouth daily as needed for allergies BRAND NAME IS EQUATE    Seasonal allergic rhinitis       aspirin 325 MG EC tablet     100 tablet    Take 1 tablet (325 mg) by mouth daily    Hyperlipidemia LDL goal <100, Essential hypertension with goal blood pressure less than 140/90, Type 2 diabetes mellitus with diabetic neuropathy, with long-term current use of insulin (H)       blood glucose monitoring lancets     3 Box    Use to test blood sugar 4 times daily or as directed.    Type 2 diabetes mellitus with diabetic neuropathy, with long-term current use of insulin (H)       blood glucose monitoring test strip    no brand specified    100 strip    Use to test blood sugar daily or as  times daily or as directed.    Type 2 diabetes mellitus with complication, with long-term current use of insulin (H)       docusate sodium 100 MG tablet    COLACE    60 tablet    Take 100 mg by mouth daily    Constipation, unspecified constipation type       DULoxetine 30 MG EC capsule    CYMBALTA    180 capsule    TAKE 1 CAPSULE BY MOUTH ONCE  A DAY    Adjustment disorder with depressed mood       furosemide 20 MG tablet    LASIX    30 tablet    TAKE 1 TABLET BY MOUTH ONCE DAILY    HTN, goal below 140/90       hydrochlorothiazide 25 MG tablet    HYDRODIURIL    90 tablet    Take 1 tablet (25 mg) by mouth daily    Essential hypertension with goal blood pressure less than 140/90       insulin pen needle  30G X 8 MM    NOVOFINE    100 each    1 Device 2 times daily    Type 2 diabetes mellitus with complication, with long-term current use of insulin (H), Type 2 diabetes mellitus with diabetic neuropathy, with long-term current use of insulin (H)       LANTUS SOLOSTAR 100 UNIT/ML injection   Generic drug:  insulin glargine     45 mL    15 units every morning and 25 units every HS    Type 2 diabetes mellitus with complication, with long-term current use of insulin (H)       lidocaine 5 % Patch    LIDODERM    30 patch    Place 1 patch onto the skin daily as needed To back PRN    Back pain       lisinopril 20 MG tablet    PRINIVIL/ZESTRIL    30 tablet    TAKE 1 TABLET BY MOUTH ONCE DAILY    HTN, goal below 140/90       metFORMIN 1000 MG tablet    GLUCOPHAGE    60 tablet    TAKE 1 TABLET BY MOUTH TWICE A DAY WITH MEALS    Type 2 diabetes mellitus with complication, with long-term current use of insulin (H)       Miconazole Nitrate Powd powder     2 Bottle    Apply topically daily as needed    Yeast dermatitis       NYSTOP [id number] UNIT/GM Powd   Generic drug:  nystatin     30 g    APPLY TO BUTTOCKS; GROIN; AND PERINEUM 3 TIMES A DAY AS NEEDED.    Yeast dermatitis       order for DME      Equipment being ordered: CPAP at previous home settings    MIO (obstructive sleep apnea)       rOPINIRole 0.25 MG tablet    REQUIP    90 tablet    Take 1 tablet (0.25 mg) by mouth At Bedtime    Restless leg syndrome       SENNA-docusate sodium 8.6-50 MG Tabs     90 tablet    TAKE 1 TABLET BY MOUTH EVERY DAY    Constipation, unspecified constipation type       simvastatin 40 MG tablet    ZOCOR    90 tablet    Take 1 tablet (40 mg) by mouth At Bedtime    Hyperlipidemia LDL goal <100

## 2018-04-19 NOTE — PROGRESS NOTES
Patient was offered choice of vendor and chose [facility].  Patient Derek Aragon was set up at [place] on April 19, 2018. Patient received a Resmed AirSense 10 Auto. Pressures were set at 10-15 cm H2O.   Patient s ramp is 6 cm H2O for Off and FLEX/EPR is EPR.  Patient is not enrolled in the STM Program and does need to meet compliance. Patient has a follow up on TBD with Dr. Santamaria.    Veena Kaur

## 2018-05-24 ENCOUNTER — PATIENT OUTREACH (OUTPATIENT)
Dept: GERIATRIC MEDICINE | Facility: CLINIC | Age: 78
End: 2018-05-24

## 2018-05-24 NOTE — PROGRESS NOTES
[facility] Care Coordination Contact  Arranged transportation thru STS provider Ania (phone: [phone]) for the below appt:  Appt Date & Time: 6/1 @ 11:20 - Ride for 2 adults  Clinic Name & Address:  [facility]  Transportation Provider: Ania   time:  11am    Notified wife Mattie of  time.    Danelle Gant  Case Management Specialist   [facility]   [phone]

## 2018-05-30 NOTE — PROGRESS NOTES
Communication History     User: Jossy Cortes RN Date/time: 5/30/2018  9:17 AM    Comment: Respite Arrangements    Context:  Outcome:     Phone number: [phone] Phone Type: Home Phone    Comm. type: Telephone Call type: Outgoing    Contact: TAYE PIERCE Relation to patient: Emergency Contact        [facility] Care Coordination Contact  CM has completed the final arrangements for members yearly 1 month Respite Stay.    Member will transfer to [facility] in [place] on 6/5/2018.  Member will bring his own WC. CM has requested a Bariatric Bed and Trapeze be provided by the facility as requested by his spouse. CM submitted this request to Hanane Mandel, intake @ [facility].    JENNIFER updated Hanane that member does have a face to face with his PCP in [place] on 5/1/2018. Hanane will request all of the needed MD paperwork from [facility].    JENNIFER requested DELMI Mcclendon forward via fax,  the following information:         current medication list, H & P, demographics with billing info, and a Level 1 faxed to me at [phone].    JENNIFER will fax an updated Obra on Friday, 6/1/2018.    JENNIFER requested DELMI Mcclendon place members incontinent products on hold at this time.    JENNIFER requested DELMI Mcclendon arrange transport for 6/5/2018 @ 9a.m. To [facility].     JENNIFER updated [facility]. JENNIFER received the following message from Prabha Yip regarding billing:     Jess you will need to write a respite (NF) auth for the period of 6/5/18 - 7/5/18, let me know if you have questions about this.    Prabha RODRIGUEZ will place all other services on hold on 8/5/2018. ([facility], PCA and SNV)    Jossy Cortes RN-[facility]   [phone]

## 2018-05-31 NOTE — PROGRESS NOTES
[facility] Care Coordination Contact  Arranged transportation one way thru STS provider JIMY Black (phone: [phone]) for the below appt:  Appt Date & Time: 6/5  Clinic Name & Address: [facility] ([address], [place]  [address])   time:  9:00    Notified Maurice of  time.    Maria Elena Cavazos  [facility]  Dept.   [phone]

## 2018-05-31 NOTE — TELEPHONE ENCOUNTER
Reason for Call:  Form, our goal is to have forms completed with 72 hours, however, some forms may require a visit or additional information.    Type of letter, form or note:  admission orders, standing orders, H & P    Who is the form from?: [facility]  (if other please explain)    Where did the form come from: form was faxed in    What clinic location was the form placed at?: [place]    Where the form was placed: [facility]    What number is listed as a contact on the form?: [phone]       Additional comments: Patient is scheduled to see Dr Melton on 6-1-18, can these orders be placed and paperwork be completed at this time.       Call taken on 5/31/2018 at 2:36 PM by Isidra Frankel

## 2018-05-31 NOTE — PROGRESS NOTES
[facility] Care Coordination Contact  Per  request order placed on hold with Donna until July 5th due to his 1 month stay     Danelle Topeteatoangela  Case Management Specialist   [facility]   [phone]

## 2018-06-01 NOTE — MR AVS SNAPSHOT
After Visit Summary   6/1/2018    Derek Aragon    MRN: [id number]           Patient Information     Date Of Birth          1940        Visit Information        Provider Department      6/1/2018 11:20 AM Rosalino Melton MD [facility]        Today's Diagnoses     Encounter for examination for admission to nursing home    -  1    History of CVA (cerebrovascular accident) wit left hemiparesis        Essential hypertension with goal blood pressure less than 140/90        Type 2 diabetes mellitus with complication, with long-term current use of insulin (H)        Restless leg syndrome        Morbid obesity (H)           Follow-ups after your visit        Follow-up notes from your care team     Return in about 3 months (around 9/1/2018) for Routine Visit.      Who to contact     If you have questions or need follow up information about today's clinic visit or your schedule please contact [facility] directly at [phone].  Normal or non-critical lab and imaging results will be communicated to you by MyChart, letter or phone within 4 business days after the clinic has received the results. If you do not hear from us within 7 days, please contact the clinic through [employer] or phone. If you have a critical or abnormal lab result, we will notify you by phone as soon as possible.  Submit refill requests through TP[employer] or call your pharmacy and they will forward the refill request to us. Please allow 3 business days for your refill to be completed.          Additional Information About Your Visit        MyChart Information     TPACK gives you secure access to your electronic health record. If you see a primary care provider, you can also send messages to your care team and make appointments. If you have questions, please call your primary care clinic.  If you do not have a primary care provider, please call [phone] and they will assist you.        Care EveryWhere ID      This is your Care EveryWhere ID. This could be used by other organizations to access your [facility] medical records  [id number]        Your Vitals Were     Pulse Temperature Respirations Pulse Oximetry BMI (Body Mass Index)       89 97.5  F (36.4  C) (Temporal) 20 96% 41.5 kg/m2        Blood Pressure from Last 3 Encounters:   06/01/18 110/58   04/19/18 108/55   01/19/18 108/62    Weight from Last 3 Encounters:   06/01/18 281 lb (127.5 kg)   01/19/18 276 lb (125.2 kg)   05/22/17 276 lb (125.2 kg)              Today, you had the following     No orders found for display       Primary Care Provider Office Phone # Fax #    Rosalino Melton [phone]       [address] [facility] [address]        Equal Access to Services     CASSIA HAWKINS : Hadii benny moseley hadasho Soomaali, waaxda luqadaha, qaybta kaalmada joao, evi trujillo . So [facility] [phone].    ATENCIÓN: Si habla español, tiene a castro disposición servicios gratuitos de asistencia lingüística. Llame al [phone].    We comply with applicable federal civil rights laws and [place] laws. We do not discriminate on the basis of race, color, national origin, age, disability, sex, sexual orientation, or gender identity.            Thank you!     Thank you for choosing [facility]  for your care. Our goal is always to provide you with excellent care. Hearing back from our patients is one way we can continue to improve our services. Please take a few minutes to complete the written survey that you may receive in the mail after your visit with us. Thank you!             Your Updated Medication List - Protect others around you: Learn how to safely use, store and throw away your medicines at www.disposemymeds.org.          This list is accurate as of 6/1/18 12:13 PM.  Always use your most recent med list.                   Brand Name Dispense Instructions for use Diagnosis    acyclovir 5 % cream    ZOVIRAX    5 g     Apply topically 5 times daily    HSV (herpes simplex virus) infection       albuterol (2.5 MG/3ML) 0.083% neb solution     90 mL    NEBULIZE 1 VIAL BY MOUTH EV NANCY 6 HOURS AS NEEDED FOR W HEEZING / SHORTNESS OF DREW TH /DYSPNEA    Chest congestion       ALLERGY RELIEF 10 MG tablet   Generic drug:  loratadine     30 tablet    Take 1 tablet (10 mg) by mouth daily as needed for allergies BRAND NAME IS EQUATE    Seasonal allergic rhinitis       aspirin 325 MG EC tablet     100 tablet    Take 1 tablet (325 mg) by mouth daily    Hyperlipidemia LDL goal <100, Essential hypertension with goal blood pressure less than 140/90, Type 2 diabetes mellitus with diabetic neuropathy, with long-term current use of insulin (H)       blood glucose monitoring lancets     3 Box    Use to test blood sugar 4 times daily or as directed.    Type 2 diabetes mellitus with diabetic neuropathy, with long-term current use of insulin (H)       blood glucose monitoring test strip    no brand specified    100 strip    Use to test blood sugar daily or as  times daily or as directed.    Type 2 diabetes mellitus with complication, with long-term current use of insulin (H)       docusate sodium 100 MG tablet    COLACE    60 tablet    Take 100 mg by mouth daily    Constipation, unspecified constipation type       DULoxetine 30 MG EC capsule    CYMBALTA    180 capsule    TAKE 1 CAPSULE BY MOUTH ONCE  A DAY    Adjustment disorder with depressed mood       furosemide 20 MG tablet    LASIX    30 tablet    TAKE 1 TABLET BY MOUTH ONCE DAILY    HTN, goal below 140/90       hydrochlorothiazide 25 MG tablet    HYDRODIURIL    90 tablet    Take 1 tablet (25 mg) by mouth daily    Essential hypertension with goal blood pressure less than 140/90       insulin pen needle 30G X 8 MM    NOVOFINE    100 each    1 Device 2 times daily    Type 2 diabetes mellitus with complication, with long-term current use of insulin (H), Type 2 diabetes mellitus with diabetic neuropathy,  with long-term current use of insulin (H)       LANTUS SOLOSTAR 100 UNIT/ML injection   Generic drug:  insulin glargine     45 mL    15 units every morning and 25 units every HS    Type 2 diabetes mellitus with complication, with long-term current use of insulin (H)       lidocaine 5 % Patch    LIDODERM    30 patch    Place 1 patch onto the skin daily as needed To back PRN    Back pain       lisinopril 20 MG tablet    PRINIVIL/ZESTRIL    30 tablet    TAKE 1 TABLET BY MOUTH ONCE DAILY    HTN, goal below 140/90       metFORMIN 1000 MG tablet    GLUCOPHAGE    60 tablet    TAKE 1 TABLET BY MOUTH TWICE A DAY WITH MEALS    Type 2 diabetes mellitus with complication, with long-term current use of insulin (H)       Miconazole Nitrate Powd powder     2 Bottle    Apply topically daily as needed    Yeast dermatitis       NYSTOP 687416 UNIT/GM Powd   Generic drug:  nystatin     30 g    APPLY TO BUTTOCKS; GROIN; AND PERINEUM 3 TIMES A DAY AS NEEDED.    Yeast dermatitis       order for DME      Equipment ordered: RESMED Auto PAP Mask type: Full face  Settings: 5-10 CM H2O        order for DME      Equipment being ordered: CPAP at previous home settings    MIO (obstructive sleep apnea)       rOPINIRole 0.25 MG tablet    REQUIP    90 tablet    Take 1 tablet (0.25 mg) by mouth At Bedtime    Restless leg syndrome       SENNA-docusate sodium 8.6-50 MG Tabs     90 tablet    TAKE 1 TABLET BY MOUTH EVERY DAY    Constipation, unspecified constipation type       simvastatin 40 MG tablet    ZOCOR    90 tablet    Take 1 tablet (40 mg) by mouth At Bedtime    Hyperlipidemia LDL goal <100

## 2018-06-01 NOTE — PROGRESS NOTES
SUBJECTIVE:   Derek Aragon is a 78 year old male who presents to clinic today for the following health issues:      Diabetes Follow-up    Patient is checking blood sugars: once daily.  Results are as follows:         am - average of 114    Diabetic concerns: None     Symptoms of hypoglycemia (low blood sugar): none     Paresthesias (numbness or burning in feet) or sores: No     Date of last diabetic eye exam: summer of 2017    Hyperlipidemia Follow-Up      Rate your low fat/cholesterol diet?: not monitoring fat    Taking statin?  Yes, states once in awhile right leg has to keep moving leg    Other lipid medications/supplements?:  none    Hypertension Follow-up      Outpatient blood pressures are not being checked.    Low Salt Diet: not monitoring salt    BP Readings from Last 2 Encounters:   06/01/18 110/58   04/19/18 108/55     Hemoglobin A1C (%)   Date Value   01/19/2018 6.1 (H)   05/22/2017 6.0     LDL Cholesterol Calculated (mg/dL)   Date Value   01/19/2018 60   05/22/2017 72       Amount of exercise or physical activity: 6-7 days/week for an average of 45-60 minutes    Problems taking medications regularly: No    Medication side effects: muscle aches right leg    Diet: regular (no restrictions)            Problem list and histories reviewed & adjusted, as indicated.  Additional history: Patient will be admitted to [facility] in [place] next week for 30 day respite stay. Standing orders signed today.    Patient Active Problem List   Diagnosis     Anxiety state     Hyperlipidemia LDL goal <100     Acute ischemic stroke (H)     MIO (obstructive sleep apnea)     Restless leg syndrome     Hemiparesis - left     DM circ dis type II, uncontrolled (ischemic CVA 2/25/12)     Morbid obesity (H)     Peripheral vascular disease (H)     Mild major depression (H)     Insomnia     History of CVA (cerebrovascular accident) wit left hemiparesis     Back pain     Constipation     Health Care Home      Essential hypertension with goal blood pressure less than 140/90     Advance Care Planning     Anal fissure     Chest pain     Anemia     Acute renal failure (H)     DVT prophylaxis     Other specified disease of nail     Dermatophytosis of nail     Generalized muscle weakness     Pyuria     Yeast dermatitis     Pain of left lower leg     Obesity, morbid, BMI 40.0-49.9 (H)     Type 2 diabetes mellitus with diabetic neuropathy (H)     Adjustment disorder with depressed mood     Type 2 diabetes mellitus with complication, with long-term current use of insulin (H)     Past Surgical History:   Procedure Laterality Date     C OPEN RX FEMUR FX+INTRAMED EDE Left          COLONOSCOPY  2012    Procedure: COLONOSCOPY;  colonoscopy;  Surgeon: Chris Crockett MD;  Location:  GI     ENT SURGERY      Parotid gland tumor removal     HC REMOVAL GALLBLADDER      Cholecystectomy       Social History   Substance Use Topics     Smoking status: Former Smoker     Packs/day: 1.00     Years: 20.00     Types: Cigarettes     Quit date: 1985     Smokeless tobacco: Never Used     Alcohol use No     Family History   Problem Relation Age of Onset     Cardiovascular Mother      MI age 75 yrs     DIABETES Mother      Obesity Mother      CEREBROVASCULAR DISEASE Father      anneurysm age 52      Obesity Maternal Grandmother      Psychotic Disorder Maternal Grandmother      suicide     Psychotic Disorder Maternal Grandfather      suicide     Lipids Sister          Current Outpatient Prescriptions   Medication Sig Dispense Refill     aspirin  MG EC tablet Take 1 tablet (325 mg) by mouth daily 100 tablet 1     blood glucose monitoring (MICHELLE MICROLET) lancets Use to test blood sugar 4 times daily or as directed. 3 Box 3     blood glucose monitoring (NO BRAND SPECIFIED) test strip Use to test blood sugar daily or as  times daily or as directed. 100 strip 3     docusate sodium (COLACE) 100 MG tablet Take 100 mg by  mouth daily 60 tablet 3     DULoxetine (CYMBALTA) 30 MG EC capsule TAKE 1 CAPSULE BY MOUTH ONCE  A  capsule 1     furosemide (LASIX) 20 MG tablet TAKE 1 TABLET BY MOUTH ONCE DAILY 30 tablet 5     hydrochlorothiazide (HYDRODIURIL) 25 MG tablet Take 1 tablet (25 mg) by mouth daily 90 tablet 1     insulin pen needle (NOVOFINE) 30G X 8 MM 1 Device 2 times daily 100 each 6     LANTUS SOLOSTAR 100 UNIT/ML soln 15 units every morning and 25 units every HS 45 mL 3     lisinopril (PRINIVIL/ZESTRIL) 20 MG tablet TAKE 1 TABLET BY MOUTH ONCE DAILY 30 tablet 8     metFORMIN (GLUCOPHAGE) 1000 MG tablet TAKE 1 TABLET BY MOUTH TWICE A DAY WITH MEALS 60 tablet 3     rOPINIRole (REQUIP) 0.25 MG tablet Take 1 tablet (0.25 mg) by mouth At Bedtime 90 tablet 1     Sennosides-Docusate Sodium (SENNA-DOCUSATE SODIUM) 8.6-50 MG TABS TAKE 1 TABLET BY MOUTH EVERY DAY 90 tablet 1     simvastatin (ZOCOR) 40 MG tablet Take 1 tablet (40 mg) by mouth At Bedtime 90 tablet 3     acyclovir (ZOVIRAX) 5 % cream Apply topically 5 times daily (Patient not taking: Reported on 6/1/2018) 5 g 3     albuterol (2.5 MG/3ML) 0.083% nebulizer solution NEBULIZE 1 VIAL BY MOUTH EV NANCY 6 HOURS AS NEEDED FOR W HEEZING / SHORTNESS OF DREW TH /DYSPNEA (Patient not taking: Reported on 6/1/2018) 90 mL 3     lidocaine (LIDODERM) 5 % patch Place 1 patch onto the skin daily as needed To back PRN (Patient not taking: Reported on 1/19/2018) 30 patch      loratadine (ALLERGY RELIEF) 10 MG tablet Take 1 tablet (10 mg) by mouth daily as needed for allergies BRAND NAME IS EQUATE 30 tablet 0     Miconazole Nitrate POWD powder Apply topically daily as needed (Patient not taking: Reported on 6/1/2018) 2 Bottle 1     Nystatin (NYSTOP) 219674 UNIT/GM POWD APPLY TO BUTTOCKS; GROIN; AND PERINEUM 3 TIMES A DAY AS NEEDED. (Patient not taking: Reported on 6/1/2018) 30 g 3     ORDER FOR DME, SET TO LOCAL PRINT, Equipment being ordered: CPAP at previous home settings (Patient not  taking: Reported on 6/1/2018)       order for DME Equipment ordered: RESMED Auto PAP Mask type: Full face  Settings: 5-10 CM H2O       Allergies   Allergen Reactions     No Known Drug Allergies      Recent Labs   Lab Test  01/19/18   0938  05/22/17   1405  10/26/16   1151  03/23/16   0837  07/31/15   1245   A1C  6.1*  6.0  5.9  6.5*   --    LDL  60  72   --   68   --    HDL  40  39*   --   43   --    TRIG  244*  339*   --   188*   --    ALT   --   12  16   --   18   CR  0.86  0.77  0.74  0.79  0.78   GFRESTIMATED  86  >90  Non  GFR Calc    >90  Non  GFR Calc    >90  Non  GFR Calc    >90  Non  GFR Calc     GFRESTBLACK  >90  >90  African American GFR Calc    >90   GFR Calc    >90   GFR Calc    >90   GFR Calc     POTASSIUM  4.4  4.7  4.2  4.1  4.5   TSH   --   1.48  1.70   --    --       BP Readings from Last 3 Encounters:   06/01/18 110/58   04/19/18 108/55   01/19/18 108/62    Wt Readings from Last 3 Encounters:   06/01/18 281 lb (127.5 kg)   01/19/18 276 lb (125.2 kg)   05/22/17 276 lb (125.2 kg)                  Labs reviewed in EPIC    Reviewed and updated as needed this visit by clinical staff  Tobacco  Allergies  Meds  Problems  Med Hx  Surg Hx  Fam Hx  Soc Hx        Reviewed and updated as needed this visit by Provider  Allergies  Meds  Problems         ROS:  CONSTITUTIONAL: NEGATIVE for fever, chills, change in weight  ENT/MOUTH: NEGATIVE for ear, mouth and throat problems  RESP: NEGATIVE for significant cough or SOB  CV: NEGATIVE for chest pain, palpitations or peripheral edema  ROS otherwise negative    OBJECTIVE:     /58  Pulse 89  Temp 97.5  F (36.4  C) (Temporal)  Resp 20  Wt 281 lb (127.5 kg)  SpO2 96%  BMI 41.5 kg/m2  Body mass index is 41.5 kg/(m^2).  GENERAL: alert, no distress and obese  EYES: Eyes grossly normal to inspection, PERRL and conjunctivae and sclerae  normal  HENT: ear canals and TM's normal, nose and mouth without ulcers or lesions  NECK: no adenopathy, no asymmetry, masses, or scars and thyroid normal to palpation  RESP: lungs clear to auscultation - no rales, rhonchi or wheezes  CV: regular rate and rhythm, normal S1 S2, no S3 or S4, no murmur, click or rub, no peripheral edema and peripheral pulses strong  ABDOMEN: soft, nontender, no hepatosplenomegaly, no masses and bowel sounds normal  NEURO: Dense left hemiparesis upper and lower extremities. Some proximal muscle control  PSYCH: mentation appears normal and affect flat      Diagnostic Test Results:  none     ASSESSMENT/PLAN:     1. Encounter for examination for admission to nursing home  Patient will be admitted to [facility] in [place] next week for 30 day respite stay. Standing orders signed today.    2. History of CVA (cerebrovascular accident) wit left hemiparesis  Prior CVA with dense left hemiparesis. He requires full time care which is provided by wife and home care nursing. He will be entering [facility] in [place] next week for 30 day respite stay. His risk factors are well controlled. BP at goal. DM at goal with A1C=6.1. High dose Statin therapy, ASA full strength and ACEI. Continue with PT/OT while in respite stay.     3. Essential hypertension with goal blood pressure less than 140/90  Chronic controlled. The current medical regimen is effective;  continue present plan and medications.     4. Type 2 diabetes mellitus with complication, with long-term current use of insulin (H)  Chronic controlled. DM at goal with A1C=6.1. High dose Statin therapy, ASA full strength and ACEI. The current medical regimen is effective;  continue present plan and medications.      5. Restless leg syndrome  Chronic stable. Continue Requip at bedtime prn    6. Morbid obesity (H)  Chronic. At risk for pressure ulcers.       FUTURE APPOINTMENTS:       - Follow-up visit in 3  months.    Rosalino Melton MD  [facility]

## 2018-06-13 NOTE — PROGRESS NOTES
Communication History     User: Jossy Cortes RN Date/time: 6/13/2018  2:06 PM    Comment: CM contacted SNF to obtain an update on member. DEVENDRA Valerio stated member is doing well and they have no concerns. CM updated Mattie, members spouse via VM of above update.    Context:  Outcome:     Phone number:  Phone Type:     Comm. type: Telephone Call type: Outgoing    Contact: [facility]  Relation to patient: Provider        Jossy Cortes RN-[facility]   [phone]

## 2018-06-28 NOTE — PROGRESS NOTES
[facility] Care Coordination Contact  CM contacted Mattie, members spouse to verify that member will be returning home from Respite @ [facility] on July5th.    Mattie stated he will be returning on the 5th. Discharge is planned for 2p.m.    Mattie will contact DELMI Mcclendon if a ride is needed as she is unsure if the SNF staff have already scheduled a ride for member.    JENNIFER informed Mattie that I would be doing my yearly LTCC visit on July 2nd or 3rd at [facility] d/t member being due on the 6th and CM not being available the 4th or the 5th. Mattie verbalized understanding.  CM will f/u with Mattie as needed following the visit.     JENNIFER has contacted the following today, 6/28/2018, to notify them that services need to be restarted on 7/5/2018.      [facility] - Spoke with [facility] - Spoke with Mattie [facility] - Spoke with Evelin Thompson / [facility] - Avelina Cortes RN-[facility]   [phone]

## 2018-07-02 NOTE — TELEPHONE ENCOUNTER
Patient was in respite for 4 weeks, he is back home now, ok to resume every other week home care for community wellness      Bettie Cabrera RN  [facility] [place]   [phone]     [facility] utilizes an encounter to take the place of a direct phone call to your office. Please take a moment to review the below request. Please reply or route message to author of this encounter.  Message will act as a verbal OK of orders requested below. Thank you.

## 2018-07-03 NOTE — TELEPHONE ENCOUNTER
[facility] is calling stating that he isn't actually home yet. He is discharging on Thursday July 5th. Request was faxed over requesting discharge orders. It was a 5 page fax with current information. Please contact Iman at [phone] once this has been addressed.  Thank you,  Mariela Melton   for [facility]

## 2018-07-03 NOTE — PROGRESS NOTES
[facility] Care Coordination Contact  Arranged transportation thru STS provider Ania (phone: [phone]) for the below appt:  Appt Date & Time: 7/5/18 @ 2:30 1 way ride  Clinic Name & Address:  Going home from Respite stay  Transportation Provider: Ania   time:  2:30     Notified Kendra [facility] of  time.    Danelle Gant  Case Management Specialist   [facility]   [phone]

## 2018-07-09 NOTE — PROGRESS NOTES
[facility] Care Coordination Contact    [facility] Home Visit Assessment     Home visit for Health Risk Assessment with Derek KOCH Mahesh completed on July 3, 2018       Current living arrangement:: I live in a private home with spouse     Assessment completed with:: Patient    Current Care Plan  Member currently receiving the following home care services:   PCA, PCA Supervision, Lifeline, Incontinent Supplies.   Member currently receiving the following community resources:  None       Medication Review  Medication reconciliation completed in Epic: No - Member is currently in the hospital for a 1 month Respite Stay.   Medication set-up & administration: Family/informal caregiver sets up weekly.  Family caregiver administers medications.  Medication understanding concerns (by member, family or CC): No  Medication adherence concerns (by member, family or CC): No    Mental/Behavioral Health   Depression Screening: See PHQ assessment flowsheet.            Falls Assessment:   Fallen 2 or more times in the past year?: No   Any fall with injury in the past year?: No    ADL/IADL Dependencies:   Dependent ADLs:: Wheelchair-with assist, Transfers, Positioning, Grooming, Eating, Dressing, Bathing  Dependent IADLs:: Cleaning, Cooking, Laundry, Shopping, Meal Preparation, Medication Management, Money Management, Transportation    [facility] sponsored benefits: Shared information re: Silver Sneakers/gym memberships, ASA, Calcium +D.    PCA Assessment completed at visit: Yes     Elderly Waiver Eligibility: Yes-will continue on EW    Care Plan & Recommendations:  All services will remain in place, no changes in PCA hours.    See CC for detailed assessment information.    CM met with member at [facility].  Members 365 day assessment was due while in the facility.  Members spouse was not present.  Member elected to not sign his forms as his wife has always been present at the visit and signs all needed  paperwork. CM will need to meet with spouse next week to obtain signature.     Follow-Up Plan: Member informed of future contact, plan to f/u with member with a 6 month telephone assessment.  Contact information shared with member and family, encouraged member to call with any questions or concerns at any time.    [facility] continuum providers: Please refer to Health Care Home on the Epic Problem List to view this patient's [facility] Care Plan Summary.    Jossy Cortes RN-[facility]   [phone]            [facility] Care Coordination Contact  CM met with members wife, 7/9/2018, at her home and obtained signature on PCA and LTCC forms.    Member was present and is doing well.    Member returned home on 7/5/2018 from his respite stay at the SNF.    Members spouse stated she was frustrated as it appears they were giving him stool softners and he has had several looses stools this a.m.  CM and spouse reviewed possible usage of a standing order at the SNF. Spouse stated that for the next respite in 2019, she would prefer we request no standing orders be used.      Overall, spouse rated this respite stay a 7 or 8 out of 10.     Spouse verbalized frustration during our visit that members son cannot be hired through the PCA agency d/t a back ground check flag.  CM educated her that CM cannot do anything about that situation.    No additional needs reported form spouse at this time.    Jossy Cortes RN-BC  [facility]   [phone]

## 2018-07-12 NOTE — LETTER
July 12, 2018    Important Plan Information    DEREK KOCH KARI  [address]  [facility] [address]  Your Care Plan  Dear Derek,  When we spoke recently, I promised to send you a Care Plan. The plan enclosed is a summary of our discussion. It includes the steps we agreed would help you meet your health goals. In addition, I can help you with:  Oblldvy-T-SnffNC  This program is available to members who need a ride to medical and dental visits. To schedule a ride, call [phone] or [phone] (toll free). TTY/TTD: 711. You can call Monday - Thursday 8 a.m. to 5 p.m. and Fridays 9 a.m. to 5 p.m.   Statwing   The Statwing program empowers you to improve your health through education and exercise. To learn more, visit ISORG, or call [employer] at [phone] (toll free) (TTY:711) from 7 a.m. - 7 p.m. Central Time, Monday-Friday.  Health Care Directive   This form helps you outline your health care wishes. You can request a form from me and I will answer any questions you have before you discuss it with your doctor.   Annual Physical  Take a key step on your path to good health and set up an annual physical at your clinic.  Questions?  Call me at [phone] Monday-Friday between 8am and 5pm.  TTY/TTD: 711. As we discussed, I plan to be in touch with you again in 6 months to follow up via phone.  Sincerely,      Jossy Cortes RN-[facility]  [phone]    cc: member records            American Indians can continue to use [facility] and [facility] ([facility]) clinics. We will not require prior approval or impose any conditions for you to get services at these clinics. For elders age 65 years and older this includes Elderly Waiver (EW) services accessed through the [facility]. If a doctor or other provider in a [facility] or [facility] clinic refers you to a provider in our network, we will not require you to see your primary care provider prior to the referral.    For  accessible formats of this publication or assistance with equal or access to our services, visit [email or web address], or call [phone] (toll free) or use your preferred relay service.    Auxiliary Aids and Services.   [facility] provides auxiliary aids and services, like qualified interpreters or information in accessible formats, free of charge and in a timely manner, to ensure an equal opportunity to participate in our health care programs. Contact [facility] Customer Service at Alert[employer]prise/contactmedicaid or call [phone] (toll free) or use your preferred relay service.    Language Assistance Services.   [facility] provides translated documents and spoken language interpreting, free of charge and in a timely manner, when language assistance services are necessary to ensure limited English speakers have meaningful access to our information and services. Contact [employer] Service at Alert[employer]prise/contactmedicaid or call [phone] (toll free) or use your preferred relay service.     Civil Rights Notice  Discrimination is against the law. [facility] does not discriminate on the basis of any of the following:    Race    Color    National Origin    Creed    Temple    Age    Public Assistance Status    Receipt of Health Care Services    Disability (including physical or mental impairment)    Sex (including sex stereotypes and gender identity)    Marital Status    Political Beliefs    Medical Condition    Genetic Information    Sexual Orientation    Claims Experience    Medical History    Health Status    Civil Rights Complaints.   You have the right to file a discrimination complaint if you believe you were treated in a discriminatory way by [facility]. You may contact any of the following four agencies directly to file a discrimination complaint.    U.S. Department of Health and Human Services  Office for Civil Rights (OCR)  You have the right to file a complaint with the OCR, a federal agency, if you believe  you have been discriminated against because of any of the following:    Race    Disability    Color    Sex (including sex stereotypes and gender identity)    National Origin    Age    Contact the OCR directly to file a complaint:         Director         U.S. Department of Health and Human Services  Office for Civil Rights         [address] [place] [address] [address]         [phone] (Voice)         [phone] (TDD)         Complaint Portal - [email or web address]/[email or web address]     [place] Department of Human Rights (MDHR)  In [place], you have the right to file a complaint with the MDHR if you believe you have been discriminated against because of any of the following:      Race    Color    National Origin    Rastafarian    Creed    Sex    Sexual Orientation    Marital Status    Public Assistance Status    Contact the MD directly to file a complaint:         [place] Department of Human Rights         [facility], [address] [place] [address]         [phone] (voice)          [phone] (toll free)         711 or [phone] ([place] Relay)         [phone] (Fax)         [email or web address]. (Email)     [place] Department of Human Services (DHS)  You have the right to file a complaint with [facility] if you believe you have been discriminated against in our health care programs because of any of the following:    Race    Color    National Origin    Creed    Rastafarian    Age    Public Assistance Status    Receipt of Health Care Services    Disability (including physical or mental impairment)    Sex (including sex stereotypes and gender identity)    Marital Status    Political Beliefs    Medical Condition    Genetic Information    Sexual Orientation    Claims Experience    Medical History    Health Status    Complaints must be in writing and filed within 180 days of the date you discovered the alleged  discrimination. The complaint must contain your name and address and describe the discrimination you are complaining about. After we get your complaint, we will review it and notify you in writing about whether we have authority to investigate. If we do, we will investigate the complaint.      [facility] will notify you in writing of the investigation s outcome. You have a right to appeal the outcome if you disagree with the decision. To appeal, you must send a written request to have [facility] review the investigation outcome period. Be brief and state why you disagree with the decision. Include additional information you think is important.      If you file a complaint in this way, the people who work for the agency named in the complaint cannot retaliate against you. This means they cannot punish you in any way for filing a complaint. Filing a complaint in this way does not stop you from seeking out other legal or administration actions.     Contact [facility] directly to file a discrimination complaint:        ATTN: Civil Rights Coordinator        [place] Department of Human Services        Equal Opportunity and Access Division        [address] [address]        [place] [address] [phone] (voice) or use your preferred relay service     [facility] Complaint Notice   Contact [facility] directly to file a discrimination complaint:  [facility] Civil Rights Coordinator  Mail Route   [address]  [place] [address] [phone] (voice) or use your preferred relay service  [email or web address]

## 2018-07-12 NOTE — PROGRESS NOTES
[facility] Care Coordination Contact  Received after visit chart from care coordinator.  Completed following tasks: Mailed copy of care plan to client, Updated services in access, Submitted referrals/auths for PCA and Entered MMIS  Chart was returned to CC.     [facility]:  Faxed completed PCA assessment to PCA Agency and mailed copies to member.  Faxed MD Communication to PCP.  Emailed referral form for auth to [facility].    Provider Signature - Full Care Plan:  Member indicates that they would like their POC shared with the following EW providers:  [facility] and [facility].  Letter and full POC faxed to providers for signature.    Danelle Gant  Case Management Specialist   [facility]   [phone]

## 2018-07-17 NOTE — TELEPHONE ENCOUNTER
Ok to continue every other week Skilled Nursing for community wellness, disease management education, diabetic assess, skin checks/assess and 2 PRLAI Cabrera RN  [facility]   [phone]    [facility] utilizes an encounter to take the place of a direct phone call to your office. Please take a moment to review the below request. Please reply or route message to author of this encounter.  Message will act as a verbal OK of orders requested below. Thank you

## 2018-09-27 NOTE — TELEPHONE ENCOUNTER
**patient has an appointment scheduled with PCP for 10/1/18**    Routing refill request to provider for review/approval because:  Labs out of range:  Microalbumin  Labs not current:  A1C    CABRERA Ye, RN  [facility]

## 2018-09-27 NOTE — TELEPHONE ENCOUNTER
"Requested Prescriptions   Pending Prescriptions Disp Refills     hydrochlorothiazide (HYDRODIURIL) 25 MG tablet [Pharmacy Med Name: HYDROCHLOROTHIAZIDE 25MG TAB] 90 tablet     Last Written Prescription Date:  2/2/18  Last Fill Quantity: 90,  # refills: 1   Last office visit: 6/1/2018 with prescribing provider:  6/1/18   Future Office Visit:   Next 5 appointments (look out 90 days)     Oct 01, 2018 10:20 AM CDT   [facility]    [address] [facility] [id number] [phone]                  Sig: TAKE 1 TABLET BY MOUTH EVERY DAY    Diuretics (Including Combos) Protocol Passed    9/27/2018  1:04 AM       Passed - Blood pressure under 140/90 in past 12 months    BP Readings from Last 3 Encounters:   06/01/18 110/58   04/19/18 108/55   01/19/18 108/62                Passed - Recent (12 mo) or future (30 days) visit within the authorizing provider's specialty    Patient had office visit in the last 12 months or has a visit in the next 30 days with authorizing provider or within the authorizing provider's specialty.  See \"Patient Info\" tab in inbasket, or \"Choose Columns\" in Meds & Orders section of the refill encounter.           Passed - Patient is age 18 or older       Passed - Normal serum creatinine on file in past 12 months    Recent Labs   Lab Test  01/19/18   0938   CR  0.86             Passed - Normal serum potassium on file in past 12 months    Recent Labs   Lab Test  01/19/18   0938   POTASSIUM  4.4                   Passed - Normal serum sodium on file in past 12 months    Recent Labs   Lab Test  01/19/18   0938   NA  140              aspirin 325 MG EC tablet [Pharmacy Med Name: ASPIRIN 325MG EC TAB] 100 tablet     Last Written Prescription Date:  2/2/18  Last Fill Quantity: 100,  # refills: 1   Last office visit: 6/1/2018 with prescribing provider:  6/1/18   Future Office Visit:   Next 5 appointments (look out 90 days)     Oct " "01, 2018 10:20 AM CDT   Office Visit with Rosalino Melton MD   [facility])    [address] [place] [id number] [phone]                  Sig: TAKE 1 TABLET BY MOUTH EVERY DAY    Analgesics (Non-Narcotic Tylenol and ASA Only) Passed    9/27/2018  1:04 AM       Passed - Recent (12 mo) or future (30 days) visit within the authorizing provider's specialty    Patient had office visit in the last 12 months or has a visit in the next 30 days with authorizing provider or within the authorizing provider's specialty.  See \"Patient Info\" tab in inbasket, or \"Choose Columns\" in Meds & Orders section of the refill encounter.           Passed - Patient is age 20 years or older    If ASA is flagged for ages under 20 years old. Forward to provider for confirmation Ryes Syndrome is not a concern.              metFORMIN (GLUCOPHAGE) 1000 MG tablet [Pharmacy Med Name: METFORMIN 1000MG TAB] 60 tablet     Last Written Prescription Date:  4/10/18  Last Fill Quantity: 60,  # refills: 3   Last office visit: 6/1/2018 with prescribing provider:  6/1/18   Future Office Visit:   Next 5 appointments (look out 90 days)     Oct 01, 2018 10:20 AM CDT   [facility]    [address] [place] [id number] [phone]                  Sig: TAKE 1 TABLET BY MOUTH TWICE A DAY WITH MEALS    Biguanide Agents Failed    9/27/2018  1:04 AM       Failed - Patient has documented A1c within the specified period of time.    If HgbA1C is 8 or greater, it needs to be on file within the past 3 months.  If less than 8, must be on file within the past 6 months.     Recent Labs   Lab Test  01/19/18   0938   A1C  6.1*            Passed - Blood pressure less than 140/90 in past 6 months    BP Readings from Last 3 Encounters:   06/01/18 110/58   04/19/18 108/55   01/19/18 108/62                Passed - Patient has documented LDL within " "the past 12 mos.    Recent Labs   Lab Test  01/19/18   0938   LDL  60            Passed - Patient has had a Microalbumin in the past 15 mos.    Recent Labs   Lab Test  01/23/18   0928   MICROL  24   UMALCR  47.09*            Passed - Patient is age 10 or older       Passed - Patient's CR is NOT>1.4 OR Patient's EGFR is NOT<45 within past 12 mos.    Recent Labs   Lab Test  01/19/18   0938   GFRESTIMATED  86   GFRESTBLACK  >90       Recent Labs   Lab Test  01/19/18   0938   CR  0.86            Passed - Patient does NOT have a diagnosis of CHF.       Passed - Recent (6 mo) or future (30 days) visit within the authorizing provider's specialty    Patient had office visit in the last 6 months or has a visit in the next 30 days with authorizing provider or within the authorizing provider's specialty.  See \"Patient Info\" tab in inbasket, or \"Choose Columns\" in Meds & Orders section of the refill encounter.              "

## 2018-09-28 NOTE — PROGRESS NOTES
[facility] Care Coordination Contact  Arranged transportation thru [facility] provider Ania (phone: [phone]) for the below appt:  Appt Date & Time: 10/1 @ 10:20am  Clinic Name & Address:  [facility]  Transportation Provider: Ania VOGEL   time:  9:50-10:10am    Notified Mattie wife of Derek of  time.      Danelle Gant  Case Management Specialist   [facility]   [phone]

## 2018-10-01 NOTE — PROGRESS NOTES
SUBJECTIVE:   Derek Aragon is a 78 year old male who presents to clinic today for the following health issues:        Diabetes Follow-up    Patient is checking blood sugars: once daily.  Results are as follows:         am - 100 to 120    Diabetic concerns: None     Symptoms of hypoglycemia (low blood sugar): none     Paresthesias (numbness or burning in feet) or sores: No     Date of last diabetic eye exam: 2 years ago    BP Readings from Last 2 Encounters:   10/01/18 126/70   06/01/18 110/58     Hemoglobin A1C (%)   Date Value   01/19/2018 6.1 (H)   05/22/2017 6.0     LDL Cholesterol Calculated (mg/dL)   Date Value   01/19/2018 60   05/22/2017 72       Diabetes Management Resources    Amount of exercise or physical activity: None    Problems taking medications regularly: No    Medication side effects: none    Diet: moderate what is eaten    Concern: Have been having a hard time hearing.     Cerebrovascular Follow-up      Patient history: ischemic cerebrovascular incident    Residual symptoms: Difficulty speaking, Weakness in the arm on the left and Weakness in the leg on the left    Worsened or new symptoms since last visit: No    Daily aspirin use: Yes    Hypertension controlled: Yes      Problem list and histories reviewed & adjusted, as indicated.  Additional history: as documented    Patient Active Problem List   Diagnosis     Anxiety state     Hyperlipidemia LDL goal <100     Acute ischemic stroke (H)     MIO (obstructive sleep apnea)     Restless leg syndrome     Hemiparesis - left     DM circ dis type II, uncontrolled (ischemic CVA 2/25/12)     Morbid obesity (H)     Peripheral vascular disease (H)     Mild major depression (H)     Insomnia     History of CVA (cerebrovascular accident) wit left hemiparesis     Back pain     Constipation     Health Care Home     Essential hypertension with goal blood pressure less than 140/90     Advance Care Planning     Anal fissure     Chest pain     Anemia     Acute  renal failure (H)     DVT prophylaxis     Other specified disease of nail     Dermatophytosis of nail     Generalized muscle weakness     Pyuria     Yeast dermatitis     Pain of left lower leg     Obesity, morbid, BMI 40.0-49.9 (H)     Type 2 diabetes mellitus with diabetic neuropathy (H)     Adjustment disorder with depressed mood     Type 2 diabetes mellitus with complication, with long-term current use of insulin (H)     Past Surgical History:   Procedure Laterality Date     C OPEN RX FEMUR FX+INTRAMED EDE Left     2016     COLONOSCOPY  2012    Procedure: COLONOSCOPY;  colonoscopy;  Surgeon: Chris Crockett MD;  Location:  GI     ENT SURGERY      Parotid gland tumor removal     HC REMOVAL GALLBLADDER      Cholecystectomy       Social History   Substance Use Topics     Smoking status: Former Smoker     Packs/day: 1.00     Years: 20.00     Types: Cigarettes     Quit date: 1985     Smokeless tobacco: Never Used     Alcohol use No     Family History   Problem Relation Age of Onset     Cardiovascular Mother      MI age 75 yrs     Diabetes Mother      Obesity Mother      Cerebrovascular Disease Father      anneurysm age 52      Obesity Maternal Grandmother      Psychotic Disorder Maternal Grandmother      suicide     Psychotic Disorder Maternal Grandfather      suicide     Lipids Sister          Current Outpatient Prescriptions   Medication Sig Dispense Refill     aspirin 325 MG EC tablet TAKE 1 TABLET BY MOUTH EVERY  tablet 0     blood glucose monitoring (MICHELLE MICROLET) lancets Use to test blood sugar 4 times daily or as directed. 3 Box 3     blood glucose monitoring (NO BRAND SPECIFIED) test strip Use to test blood sugar daily or as  times daily or as directed. 100 strip 3     DULoxetine (CYMBALTA) 30 MG EC capsule TAKE 1 CAPSULE BY MOUTH ONCE  A  capsule 1     furosemide (LASIX) 20 MG tablet TAKE 1 TABLET BY MOUTH ONCE DAILY 30 tablet 5     hydrochlorothiazide (HYDRODIURIL)  25 MG tablet TAKE 1 TABLET BY MOUTH EVERY DAY 90 tablet 0     insulin pen needle (NOVOFINE) 30G X 8 MM 1 Device 2 times daily 100 each 6     LANTUS SOLOSTAR 100 UNIT/ML soln 15 units every morning and 25 units every HS 45 mL 3     lisinopril (PRINIVIL/ZESTRIL) 20 MG tablet TAKE 1 TABLET BY MOUTH ONCE DAILY 30 tablet 8     loratadine (ALLERGY RELIEF) 10 MG tablet Take 1 tablet (10 mg) by mouth daily as needed for allergies BRAND NAME IS EQUATE 30 tablet 0     metFORMIN (GLUCOPHAGE) 1000 MG tablet TAKE 1 TABLET BY MOUTH TWICE A DAY WITH MEALS 60 tablet 0     order for DME Equipment ordered: RESMED Auto PAP Mask type: Full face  Settings: 5-10 CM H2O       rOPINIRole (REQUIP) 0.25 MG tablet Take 1 tablet (0.25 mg) by mouth At Bedtime 90 tablet 1     Sennosides-Docusate Sodium (SENNA-DOCUSATE SODIUM) 8.6-50 MG TABS TAKE 1 TABLET BY MOUTH EVERY DAY 90 tablet 1     simvastatin (ZOCOR) 40 MG tablet Take 1 tablet (40 mg) by mouth At Bedtime 90 tablet 3     acyclovir (ZOVIRAX) 5 % cream Apply topically 5 times daily (Patient not taking: Reported on 6/1/2018) 5 g 3     albuterol (2.5 MG/3ML) 0.083% nebulizer solution NEBULIZE 1 VIAL BY MOUTH EV NANCY 6 HOURS AS NEEDED FOR W HEEZING / SHORTNESS OF DREW TH /DYSPNEA (Patient not taking: Reported on 6/1/2018) 90 mL 3     docusate sodium (COLACE) 100 MG tablet Take 100 mg by mouth daily (Patient not taking: Reported on 10/1/2018) 60 tablet 3     lidocaine (LIDODERM) 5 % patch Place 1 patch onto the skin daily as needed To back PRN (Patient not taking: Reported on 1/19/2018) 30 patch      Miconazole Nitrate POWD powder Apply topically daily as needed (Patient not taking: Reported on 10/1/2018) 2 Bottle 1     Nystatin (NYSTOP) 289941 UNIT/GM POWD APPLY TO BUTTOCKS; GROIN; AND PERINEUM 3 TIMES A DAY AS NEEDED. (Patient not taking: Reported on 6/1/2018) 30 g 3     ORDER FOR DME, SET TO LOCAL PRINT, Equipment being ordered: CPAP at previous home settings (Patient not taking: Reported on  6/1/2018)       Allergies   Allergen Reactions     No Known Drug Allergies      Recent Labs   Lab Test  01/19/18   0938  05/22/17   1405  10/26/16   1151  03/23/16   0837  07/31/15   1245   A1C  6.1*  6.0  5.9  6.5*   --    LDL  60  72   --   68   --    HDL  40  39*   --   43   --    TRIG  244*  339*   --   188*   --    ALT   --   12  16   --   18   CR  0.86  0.77  0.74  0.79  0.78   GFRESTIMATED  86  >90  Non  GFR Calc    >90  Non  GFR Calc    >90  Non  GFR Calc    >90  Non  GFR Calc     GFRESTBLACK  >90  >90  African American GFR Calc    >90   GFR Calc    >90   GFR Calc    >90   GFR Calc     POTASSIUM  4.4  4.7  4.2  4.1  4.5   TSH   --   1.48  1.70   --    --       BP Readings from Last 3 Encounters:   10/01/18 126/70   06/01/18 110/58   04/19/18 108/55    Wt Readings from Last 3 Encounters:   06/01/18 281 lb (127.5 kg)   01/19/18 276 lb (125.2 kg)   05/22/17 276 lb (125.2 kg)                  Labs reviewed in EPIC    Reviewed and updated as needed this visit by clinical staff  Tobacco  Allergies  Meds  Problems  Med Hx  Surg Hx  Fam Hx  Soc Hx        Reviewed and updated as needed this visit by Provider  Allergies  Meds  Problems         ROS:  Constitutional, HEENT, cardiovascular, pulmonary, gi and gu systems are negative, except as otherwise noted.    OBJECTIVE:     /70 (BP Location: Right arm, Patient Position: Chair, Cuff Size: Adult Large)  Pulse 74  Temp 95.8  F (35.4  C) (Temporal)  Resp 20  SpO2 97%  There is no height or weight on file to calculate BMI.  GENERAL: alert, no distress, obese and aphasic  EYES: Eyes grossly normal to inspection, PERRL and conjunctivae and sclerae normal  HENT: normal cephalic/atraumatic, both ears: occluded with wax, right ear irrigated clear, left ear persistent small cerumen. Patient did not tolerate further irrigation or curette, nose and  mouth without ulcers or lesions, oropharynx clear and oral mucous membranes moist  NECK: no adenopathy, no asymmetry, masses, or scars and thyroid normal to palpation  RESP: lungs clear to auscultation - no rales, rhonchi or wheezes  CV: regular rate and rhythm, normal S1 S2, no S3 or S4, no murmur, click or rub, no peripheral edema and peripheral pulses strong  ABDOMEN: soft, nontender, no hepatosplenomegaly, no masses and bowel sounds normal  MS: 2+ edema to left knee  NEURO: Dense left hemiparesis    Diagnostic Test Results:  Results for orders placed or performed in visit on 10/01/18 (from the past 24 hour(s))   Hemoglobin A1c   Result Value Ref Range    Hemoglobin A1C 6.4 (H) 0 - 5.6 %       ASSESSMENT/PLAN:     1. Type 2 diabetes mellitus with complication, with long-term current use of insulin (H)  Chronic, controlled with prior stroke. The current medical regimen is effective;  continue present plan and medications.   - Hemoglobin A1c  - JUST IN CASE  - LANTUS SOLOSTAR 100 UNIT/ML soln; 15 units every morning and 25 units every HS  Dispense: 45 mL; Refill: 5  - metFORMIN (GLUCOPHAGE) 1000 MG tablet; TAKE 1 TABLET BY MOUTH TWICE A DAY WITH MEALS  Dispense: 180 tablet; Refill: 1    2. HTN, goal below 140/90  Chronic controlled. The current medical regimen is effective;  continue present plan and medications.   - furosemide (LASIX) 20 MG tablet; Take 1 tablet (20 mg) by mouth daily  Dispense: 90 tablet; Refill: 3  - lisinopril (PRINIVIL/ZESTRIL) 20 MG tablet; Take 1 tablet (20 mg) by mouth daily  Dispense: 90 tablet; Refill: 1    4. Adjustment disorder with depressed mood  Chronic due to prior stroke. The current medical regimen is effective;  continue present plan and medications.   - DULoxetine (CYMBALTA) 30 MG EC capsule; TAKE 1 CAPSULE BY MOUTH ONCE  A DAY  Dispense: 180 capsule; Refill: 1    5. Bilateral impacted cerumen  Chronic recurrent. Continue Daily Debrox otic drops    6. Need for prophylactic  vaccination and inoculation against influenza  - FLU VACCINE, INCREASED ANTIGEN, PRESV FREE, AGE 65+ [75130]  - Vaccine Administration, Initial [85560]    FUTURE APPOINTMENTS:       - Follow-up visit in 6 months.    Rosalino Melton MD  [facility]    Injectable Influenza Immunization Documentation    1.  Is the person to be vaccinated sick today?   No    2. Does the person to be vaccinated have an allergy to a component   of the vaccine?   No  Egg Allergy Algorithm Link    3. Has the person to be vaccinated ever had a serious reaction   to influenza vaccine in the past?   No    4. Has the person to be vaccinated ever had Guillain-Barré syndrome?   No    Form completed by Kiah Garcia MA 10/1/2018  10:47 AM

## 2018-10-01 NOTE — MR AVS SNAPSHOT
After Visit Summary   10/1/2018    Derek Aragon    MRN: [id number]           Patient Information     Date Of Birth          1940        Visit Information        Provider Department      10/1/2018 10:20 AM Rosalino Melton MD [facility]        Today's Diagnoses     Type 2 diabetes mellitus with complication, with long-term current use of insulin (H)    -  1    Need for prophylactic vaccination and inoculation against influenza        HTN, goal below 140/90        Essential hypertension with goal blood pressure less than 140/90        Adjustment disorder with depressed mood           Follow-ups after your visit        Follow-up notes from your care team     Return in about 6 months (around 4/1/2019) for Lab Work, Routine Visit recheck DM.      Who to contact     If you have questions or need follow up information about today's clinic visit or your schedule please contact [facility] directly at [phone].  Normal or non-critical lab and imaging results will be communicated to you by [employer], letter or phone within 4 business days after the clinic has received the results. If you do not hear from us within 7 days, please contact the clinic through [employer] or phone. If you have a critical or abnormal lab result, we will notify you by phone as soon as possible.  Submit refill requests through [employer] SMS or call your pharmacy and they will forward the refill request to us. Please allow 3 business days for your refill to be completed.          Additional Information About Your Visit        MyChart Information     [employer] SMS gives you secure access to your electronic health record. If you see a primary care provider, you can also send messages to your care team and make appointments. If you have questions, please call your primary care clinic.  If you do not have a primary care provider, please call [phone] and they will assist you.        Care EveryWhere ID     This is your  Care EveryWhere ID. This could be used by other organizations to access your [facility] medical records  [id number]        Your Vitals Were     Pulse Temperature Respirations Pulse Oximetry          74 95.8  F (35.4  C) (Temporal) 20 97%         Blood Pressure from Last 3 Encounters:   10/01/18 126/70   06/01/18 110/58   04/19/18 108/55    Weight from Last 3 Encounters:   06/01/18 281 lb (127.5 kg)   01/19/18 276 lb (125.2 kg)   05/22/17 276 lb (125.2 kg)              We Performed the Following     FLU VACCINE, INCREASED ANTIGEN, PRESV FREE, AGE 65+ [23410]     Hemoglobin A1c     JUST IN CASE     Vaccine Administration, Initial [38841]          Today's Medication Changes          These changes are accurate as of 10/1/18 11:07 AM.  If you have any questions, ask your nurse or doctor.               These medicines have changed or have updated prescriptions.        Dose/Directions    furosemide 20 MG tablet   Commonly known as:  LASIX   This may have changed:  See the new instructions.   Used for:  HTN, goal below 140/90   Changed by:  Rosalino Melton MD        Dose:  20 mg   Take 1 tablet (20 mg) by mouth daily   Quantity:  90 tablet   Refills:  3       hydrochlorothiazide 25 MG tablet   Commonly known as:  HYDRODIURIL   This may have changed:  See the new instructions.   Used for:  Essential hypertension with goal blood pressure less than 140/90   Changed by:  Rosalino Melton MD        Dose:  25 mg   Take 1 tablet (25 mg) by mouth daily   Quantity:  90 tablet   Refills:  3       lisinopril 20 MG tablet   Commonly known as:  PRINIVIL/ZESTRIL   This may have changed:  See the new instructions.   Used for:  HTN, goal below 140/90   Changed by:  Rosalino Melton MD        Dose:  20 mg   Take 1 tablet (20 mg) by mouth daily   Quantity:  90 tablet   Refills:  1            Where to get your medicines      These medications were sent to [id number] - [place]  [address] [place] [address] Avenue   [address] [place] [id number]     Hours:  M-F 8:30-6:30; Sat 9-4; closed Sunday Phone:  [phone]     DULoxetine 30 MG EC capsule    furosemide 20 MG tablet    hydrochlorothiazide 25 MG tablet    LANTUS SOLOSTAR 100 UNIT/ML injection    lisinopril 20 MG tablet    metFORMIN 1000 MG tablet                Primary Care Provider Office Phone # Fax #    Rosalino Melton [phone]       [address] [facility] [address]        Equal Access to Services     CASSIA HAWKINS : Hadii aad ku hadasho Soomaali, waaxda luqadaha, qaybta kaalmada adeegyada, waxay idiin hayaan adeeg rosiebiankamere lapatsy . So [facility] [phone].    ATENCIÓN: Si habla español, tiene a castro disposición servicios gratuitos de asistencia lingüística. [facility] [phone].    We comply with applicable federal civil rights laws and [place] laws. We do not discriminate on the basis of race, color, national origin, age, disability, sex, sexual orientation, or gender identity.            Thank you!     Thank you for choosing [facility]  for your care. Our goal is always to provide you with excellent care. Hearing back from our patients is one way we can continue to improve our services. Please take a few minutes to complete the written survey that you may receive in the mail after your visit with us. Thank you!             Your Updated Medication List - Protect others around you: Learn how to safely use, store and throw away your medicines at www.disposemymeds.org.          This list is accurate as of 10/1/18 11:07 AM.  Always use your most recent med list.                   Brand Name Dispense Instructions for use Diagnosis    acyclovir 5 % cream    ZOVIRAX    5 g    Apply topically 5 times daily    HSV (herpes simplex virus) infection       albuterol (2.5 MG/3ML) 0.083% neb solution     90 mL    NEBULIZE 1 VIAL BY MOUTH EV NANCY 6 HOURS AS NEEDED FOR W HEEZING / SHORTNESS OF DREW TH /DYSPNEA    Chest congestion       ALLERGY RELIEF 10 MG tablet   Generic drug:  loratadine      30 tablet    Take 1 tablet (10 mg) by mouth daily as needed for allergies BRAND NAME IS EQUATE    Seasonal allergic rhinitis       aspirin 325 MG EC tablet     100 tablet    TAKE 1 TABLET BY MOUTH EVERY DAY    Hyperlipidemia LDL goal <100, Essential hypertension with goal blood pressure less than 140/90, Type 2 diabetes mellitus with diabetic neuropathy, with long-term current use of insulin (H)       blood glucose monitoring lancets     3 Box    Use to test blood sugar 4 times daily or as directed.    Type 2 diabetes mellitus with diabetic neuropathy, with long-term current use of insulin (H)       blood glucose monitoring test strip    no brand specified    100 strip    Use to test blood sugar daily or as  times daily or as directed.    Type 2 diabetes mellitus with complication, with long-term current use of insulin (H)       docusate sodium 100 MG tablet    COLACE    60 tablet    Take 100 mg by mouth daily    Constipation, unspecified constipation type       DULoxetine 30 MG EC capsule    CYMBALTA    180 capsule    TAKE 1 CAPSULE BY MOUTH ONCE  A DAY    Adjustment disorder with depressed mood       furosemide 20 MG tablet    LASIX    90 tablet    Take 1 tablet (20 mg) by mouth daily    HTN, goal below 140/90       hydrochlorothiazide 25 MG tablet    HYDRODIURIL    90 tablet    Take 1 tablet (25 mg) by mouth daily    Essential hypertension with goal blood pressure less than 140/90       insulin pen needle 30G X 8 MM    NOVOFINE    100 each    1 Device 2 times daily    Type 2 diabetes mellitus with complication, with long-term current use of insulin (H), Type 2 diabetes mellitus with diabetic neuropathy, with long-term current use of insulin (H)       LANTUS SOLOSTAR 100 UNIT/ML injection   Generic drug:  insulin glargine     45 mL    15 units every morning and 25 units every HS    Type 2 diabetes mellitus with complication, with long-term current use of insulin (H)       lidocaine 5 % Patch    LIDODERM    30  patch    Place 1 patch onto the skin daily as needed To back PRN    Back pain       lisinopril 20 MG tablet    PRINIVIL/ZESTRIL    90 tablet    Take 1 tablet (20 mg) by mouth daily    HTN, goal below 140/90       metFORMIN 1000 MG tablet    GLUCOPHAGE    180 tablet    TAKE 1 TABLET BY MOUTH TWICE A DAY WITH MEALS    Type 2 diabetes mellitus with complication, with long-term current use of insulin (H)       Miconazole Nitrate Powd powder     2 Bottle    Apply topically daily as needed    Yeast dermatitis       NYSTOP 447040 UNIT/GM Powd   Generic drug:  nystatin     30 g    APPLY TO BUTTOCKS; GROIN; AND PERINEUM 3 TIMES A DAY AS NEEDED.    Yeast dermatitis       order for DME      Equipment ordered: RESMED Auto PAP Mask type: Full face  Settings: 5-10 CM H2O        order for DME      Equipment being ordered: CPAP at previous home settings    MIO (obstructive sleep apnea)       rOPINIRole 0.25 MG tablet    REQUIP    90 tablet    Take 1 tablet (0.25 mg) by mouth At Bedtime    Restless leg syndrome       SENNA-docusate sodium 8.6-50 MG Tabs     90 tablet    TAKE 1 TABLET BY MOUTH EVERY DAY    Constipation, unspecified constipation type       simvastatin 40 MG tablet    ZOCOR    90 tablet    Take 1 tablet (40 mg) by mouth At Bedtime    Hyperlipidemia LDL goal <100

## 2018-10-02 NOTE — TELEPHONE ENCOUNTER
"Requested Prescriptions   Pending Prescriptions Disp Refills     STOOL SOFTENER/LAXATIVE 50-8.6 MG per tablet [Pharmacy Med Name: STOOL SOFTENER W/ LAX TAB] 90 tablet     Last Written Prescription Date:  12/11/17  Last Fill Quantity: 90,  # refills: 1   Last office visit: 10/1/2018 with prescribing provider:  10/1/18   Future Office Visit:     Sig: TAKE 1 TABLET BY MOUTH EVERY DAY    Laxatives Protocol Passed    10/1/2018 12:14 PM       Passed - Patient is age 6 or older       Passed - Recent (12 mo) or future (30 days) visit within the authorizing provider's specialty    Patient had office visit in the last 12 months or has a visit in the next 30 days with authorizing provider or within the authorizing provider's specialty.  See \"Patient Info\" tab in inbasket, or \"Choose Columns\" in Meds & Orders section of the refill encounter.            "

## 2018-10-02 NOTE — TELEPHONE ENCOUNTER
Prescription approved per [facility] Refill Protocol.    LEANNE YeN, RN  [facility]

## 2018-10-12 NOTE — TELEPHONE ENCOUNTER
Prior Authorization Retail Medication Request    Medication/Dose: LANTUS SOLOSTAR 100 UNIT/ML soln  ICD code (if different than what is on RX):  Type 2 diabetes mellitus with complication, with long-term current use of insulin (H) [E11.8, Z79.4]    Previously Tried and Failed:  na  Rationale:  na    Insurance Name:  [employer] [phone]  Insurance ID:  [id number]  [id number]      Pharmacy Information (if different than what is on RX)  Name:  [facility]  Phone:  [phone]

## 2018-10-15 NOTE — TELEPHONE ENCOUNTER
Central Prior Authorization Team   Phone: [phone]    PA Initiation    Medication: LANTUS SOLOSTAR 100 UNIT/ML soln  Insurance Company: [employer] - Phone [phone] Fax [phone]  Pharmacy Filling the Rx: [id number] - [place], [place] - [address] [place] [address]  Filling Pharmacy Phone: [phone]  Filling Pharmacy Fax:    Start Date: 10/15/2018      Manually faxed request to insurance.

## 2018-10-16 NOTE — TELEPHONE ENCOUNTER
Prior Authorization Approval    Authorization Effective Date: 7/18/2018  Authorization Expiration Date: 10/16/2019  Medication: LANTUS SOLOSTAR 100 UNIT/ML soln  Approved Dose/Quantity:    Reference #:     Insurance Company: [employer] - Phone [phone] Fax [phone]  Expected CoPay:       CoPay Card Available:      Foundation Assistance Needed:    Which Pharmacy is filling the prescription (Not needed for infusion/clinic administered): [id number] [place] [facility] [address] [place] [address]  Pharmacy Notified: Yes  Patient Notified: Yes

## 2018-10-31 NOTE — TELEPHONE ENCOUNTER
"Requested Prescriptions   Pending Prescriptions Disp Refills     blood glucose monitoring (NO BRAND SPECIFIED) test strip 100 strip 3    Last Written Prescription Date:  8/16/17  Last Fill Quantity: 100,  # refills: 3   Last office visit: 10/1/2018 with prescribing provider:  10/1/18   Future Office Visit:     Sig: Use to test blood sugar daily or as  times daily or as directed.    Diabetic Supplies Protocol Passed    10/29/2018  5:31 PM       Passed - Patient is 18 years of age or older       Passed - Recent (6 mo) or future (30 days) visit within the authorizing provider's specialty    Patient had office visit in the last 6 months or has a visit in the next 30 days with authorizing provider.  See \"Patient Info\" tab in inbasket, or \"Choose Columns\" in Meds & Orders section of the refill encounter.            "

## 2018-10-31 NOTE — TELEPHONE ENCOUNTER
Prescription approved per [facility] Refill Protocol.    LEANNE YeN, RN  [facility]

## 2018-11-26 NOTE — TELEPHONE ENCOUNTER
Home Health Certification and Plan of Care forms received.     RN reviewed medications.     Forms given to PCP to sign.     CABRERA Ye, RN  [facility]

## 2018-12-27 NOTE — TELEPHONE ENCOUNTER
"Requested Prescriptions   Pending Prescriptions Disp Refills     aspirin (ASA) 325 MG EC tablet [Pharmacy Med Name: ASPIRIN 325MG EC TABLET] 100 tablet 0    Last Written Prescription Date:  9/27/18  Last Fill Quantity: 100,  # refills: 0   Last office visit: 10/1/2018 with prescribing provider:  10/1/18   Future Office Visit:     Sig: TAKE 1 TABLET BY MOUTH ONCE DAILY    Analgesics (Non-Narcotic Tylenol and ASA Only) Passed - 12/26/2018  1:03 AM       Passed - Recent (12 mo) or future (30 days) visit within the authorizing provider's specialty    Patient had office visit in the last 12 months or has a visit in the next 30 days with authorizing provider or within the authorizing provider's specialty.  See \"Patient Info\" tab in inbasket, or \"Choose Columns\" in Meds & Orders section of the refill encounter.             Passed - Patient is age 20 years or older    If ASA is flagged for ages under 20 years old. Forward to provider for confirmation Ryes Syndrome is not a concern.              "

## 2018-12-27 NOTE — TELEPHONE ENCOUNTER
Prescription approved per [facility] Refill Protocol.    LEANNE YeN, RN  [facility]

## 2019-01-01 ENCOUNTER — APPOINTMENT (OUTPATIENT)
Dept: OCCUPATIONAL THERAPY | Facility: CLINIC | Age: 79
DRG: 871 | End: 2019-01-01
Attending: HOSPITALIST
Payer: COMMERCIAL

## 2019-01-01 ENCOUNTER — TELEPHONE (OUTPATIENT)
Dept: FAMILY MEDICINE | Facility: OTHER | Age: 79
End: 2019-01-01

## 2019-01-01 ENCOUNTER — HOSPITAL ENCOUNTER (INPATIENT)
Facility: CLINIC | Age: 79
LOS: 11 days | Discharge: NURSING, REHAB OR LONG-TERM CARE | DRG: 871 | End: 2019-05-02
Attending: EMERGENCY MEDICINE | Admitting: PEDIATRICS
Payer: COMMERCIAL

## 2019-01-01 ENCOUNTER — APPOINTMENT (OUTPATIENT)
Dept: CT IMAGING | Facility: CLINIC | Age: 79
DRG: 871 | End: 2019-01-01
Attending: EMERGENCY MEDICINE
Payer: COMMERCIAL

## 2019-01-01 ENCOUNTER — TELEPHONE (OUTPATIENT)
Dept: CARDIOLOGY | Facility: CLINIC | Age: 79
End: 2019-01-01

## 2019-01-01 ENCOUNTER — NURSING HOME VISIT (OUTPATIENT)
Dept: GERIATRICS | Facility: CLINIC | Age: 79
End: 2019-01-01
Payer: COMMERCIAL

## 2019-01-01 ENCOUNTER — APPOINTMENT (OUTPATIENT)
Dept: CT IMAGING | Facility: CLINIC | Age: 79
DRG: 871 | End: 2019-01-01
Attending: FAMILY MEDICINE
Payer: COMMERCIAL

## 2019-01-01 ENCOUNTER — APPOINTMENT (OUTPATIENT)
Dept: GENERAL RADIOLOGY | Facility: CLINIC | Age: 79
DRG: 871 | End: 2019-01-01
Attending: EMERGENCY MEDICINE
Payer: COMMERCIAL

## 2019-01-01 ENCOUNTER — APPOINTMENT (OUTPATIENT)
Dept: PHYSICAL THERAPY | Facility: CLINIC | Age: 79
DRG: 871 | End: 2019-01-01
Payer: COMMERCIAL

## 2019-01-01 ENCOUNTER — DOCUMENTATION ONLY (OUTPATIENT)
Dept: CARE COORDINATION | Facility: CLINIC | Age: 79
End: 2019-01-01

## 2019-01-01 ENCOUNTER — APPOINTMENT (OUTPATIENT)
Dept: CARDIOLOGY | Facility: CLINIC | Age: 79
DRG: 871 | End: 2019-01-01
Attending: PEDIATRICS
Payer: COMMERCIAL

## 2019-01-01 ENCOUNTER — PATIENT OUTREACH (OUTPATIENT)
Dept: GERIATRIC MEDICINE | Facility: CLINIC | Age: 79
End: 2019-01-01

## 2019-01-01 ENCOUNTER — HOSPITAL LABORATORY (OUTPATIENT)
Dept: NURSING HOME | Facility: OTHER | Age: 79
End: 2019-01-01

## 2019-01-01 ENCOUNTER — APPOINTMENT (OUTPATIENT)
Dept: GENERAL RADIOLOGY | Facility: CLINIC | Age: 79
DRG: 871 | End: 2019-01-01
Attending: FAMILY MEDICINE
Payer: COMMERCIAL

## 2019-01-01 ENCOUNTER — APPOINTMENT (OUTPATIENT)
Dept: SPEECH THERAPY | Facility: CLINIC | Age: 79
DRG: 871 | End: 2019-01-01
Payer: COMMERCIAL

## 2019-01-01 ENCOUNTER — APPOINTMENT (OUTPATIENT)
Dept: PHYSICAL THERAPY | Facility: CLINIC | Age: 79
DRG: 871 | End: 2019-01-01
Attending: HOSPITALIST
Payer: COMMERCIAL

## 2019-01-01 ENCOUNTER — DOCUMENTATION ONLY (OUTPATIENT)
Dept: OTHER | Facility: CLINIC | Age: 79
End: 2019-01-01

## 2019-01-01 ENCOUNTER — HOSPITAL ENCOUNTER (OUTPATIENT)
Dept: CARDIOLOGY | Facility: CLINIC | Age: 79
Discharge: HOME | DRG: 871 | End: 2019-05-02
Attending: PEDIATRICS | Admitting: PEDIATRICS
Payer: COMMERCIAL

## 2019-01-01 VITALS
BODY MASS INDEX: 41.1 KG/M2 | SYSTOLIC BLOOD PRESSURE: 135 MMHG | WEIGHT: 277.5 LBS | RESPIRATION RATE: 20 BRPM | OXYGEN SATURATION: 95 % | HEIGHT: 69 IN | DIASTOLIC BLOOD PRESSURE: 74 MMHG | TEMPERATURE: 96.3 F

## 2019-01-01 VITALS
DIASTOLIC BLOOD PRESSURE: 83 MMHG | BODY MASS INDEX: 38.14 KG/M2 | TEMPERATURE: 96.8 F | WEIGHT: 257.5 LBS | SYSTOLIC BLOOD PRESSURE: 149 MMHG | OXYGEN SATURATION: 90 % | HEART RATE: 84 BPM | RESPIRATION RATE: 24 BRPM | HEIGHT: 69 IN

## 2019-01-01 VITALS
RESPIRATION RATE: 28 BRPM | HEIGHT: 69 IN | DIASTOLIC BLOOD PRESSURE: 59 MMHG | OXYGEN SATURATION: 93 % | SYSTOLIC BLOOD PRESSURE: 159 MMHG | TEMPERATURE: 97.8 F | BODY MASS INDEX: 43.84 KG/M2 | HEART RATE: 93 BPM | WEIGHT: 296 LBS

## 2019-01-01 VITALS
WEIGHT: 305.34 LBS | HEIGHT: 69 IN | SYSTOLIC BLOOD PRESSURE: 146 MMHG | OXYGEN SATURATION: 97 % | TEMPERATURE: 98.3 F | RESPIRATION RATE: 32 BRPM | DIASTOLIC BLOOD PRESSURE: 63 MMHG | BODY MASS INDEX: 45.22 KG/M2 | HEART RATE: 94 BPM

## 2019-01-01 DIAGNOSIS — Z79.4 TYPE 2 DIABETES MELLITUS WITH COMPLICATION, WITH LONG-TERM CURRENT USE OF INSULIN (H): ICD-10-CM

## 2019-01-01 DIAGNOSIS — K55.9 ISCHEMIC COLITIS (H): Primary | ICD-10-CM

## 2019-01-01 DIAGNOSIS — I63.9 ENCEPHALOMALACIA WITH CEREBRAL INFARCTION (H): ICD-10-CM

## 2019-01-01 DIAGNOSIS — I48.0 PAROXYSMAL ATRIAL FIBRILLATION (H): ICD-10-CM

## 2019-01-01 DIAGNOSIS — Z79.4 TYPE 2 DIABETES MELLITUS WITH DIABETIC NEUROPATHY, WITH LONG-TERM CURRENT USE OF INSULIN (H): ICD-10-CM

## 2019-01-01 DIAGNOSIS — E66.01 OBESITY, MORBID, BMI 40.0-49.9 (H): ICD-10-CM

## 2019-01-01 DIAGNOSIS — B37.2 YEAST DERMATITIS: Primary | ICD-10-CM

## 2019-01-01 DIAGNOSIS — I69.320 APHASIA AS LATE EFFECT OF STROKE: ICD-10-CM

## 2019-01-01 DIAGNOSIS — F43.21 ADJUSTMENT DISORDER WITH DEPRESSED MOOD: ICD-10-CM

## 2019-01-01 DIAGNOSIS — E11.40 TYPE 2 DIABETES MELLITUS WITH DIABETIC NEUROPATHY, WITH LONG-TERM CURRENT USE OF INSULIN (H): ICD-10-CM

## 2019-01-01 DIAGNOSIS — L30.9 DERMATITIS: ICD-10-CM

## 2019-01-01 DIAGNOSIS — A41.9 SEPSIS, DUE TO UNSPECIFIED ORGANISM: ICD-10-CM

## 2019-01-01 DIAGNOSIS — G81.94 LEFT HEMIPARESIS (H): ICD-10-CM

## 2019-01-01 DIAGNOSIS — E78.5 HYPERLIPIDEMIA LDL GOAL <100: ICD-10-CM

## 2019-01-01 DIAGNOSIS — E11.8 TYPE 2 DIABETES MELLITUS WITH COMPLICATION, WITH LONG-TERM CURRENT USE OF INSULIN (H): ICD-10-CM

## 2019-01-01 DIAGNOSIS — Z79.4 TYPE 2 DIABETES MELLITUS WITH COMPLICATION, WITH LONG-TERM CURRENT USE OF INSULIN (H): Primary | ICD-10-CM

## 2019-01-01 DIAGNOSIS — I10 HTN, GOAL BELOW 140/90: ICD-10-CM

## 2019-01-01 DIAGNOSIS — E87.6 HYPOKALEMIA: ICD-10-CM

## 2019-01-01 DIAGNOSIS — G47.33 OSA (OBSTRUCTIVE SLEEP APNEA): ICD-10-CM

## 2019-01-01 DIAGNOSIS — E11.8 TYPE 2 DIABETES MELLITUS WITH COMPLICATION, WITH LONG-TERM CURRENT USE OF INSULIN (H): Primary | ICD-10-CM

## 2019-01-01 DIAGNOSIS — I10 ESSENTIAL HYPERTENSION WITH GOAL BLOOD PRESSURE LESS THAN 140/90: ICD-10-CM

## 2019-01-01 DIAGNOSIS — K59.00 CONSTIPATION, UNSPECIFIED CONSTIPATION TYPE: ICD-10-CM

## 2019-01-01 DIAGNOSIS — G93.89 ENCEPHALOMALACIA WITH CEREBRAL INFARCTION (H): ICD-10-CM

## 2019-01-01 DIAGNOSIS — F32.0 MILD MAJOR DEPRESSION (H): ICD-10-CM

## 2019-01-01 DIAGNOSIS — G25.81 RESTLESS LEG SYNDROME: ICD-10-CM

## 2019-01-01 DIAGNOSIS — R62.7 FAILURE TO THRIVE IN ADULT: ICD-10-CM

## 2019-01-01 DIAGNOSIS — Z86.73 HISTORY OF CVA (CEREBROVASCULAR ACCIDENT): ICD-10-CM

## 2019-01-01 LAB
ABO + RH BLD: NORMAL
ABO + RH BLD: NORMAL
ALBUMIN SERPL-MCNC: 2.1 G/DL (ref 3.4–5)
ALBUMIN SERPL-MCNC: 2.2 G/DL (ref 3.4–5)
ALBUMIN SERPL-MCNC: 2.6 G/DL (ref 3.4–5)
ALBUMIN SERPL-MCNC: 3.5 G/DL (ref 3.4–5)
ALBUMIN UR-MCNC: 100 MG/DL
ALBUMIN UR-MCNC: NEGATIVE MG/DL
ALP SERPL-CCNC: 109 U/L (ref 40–150)
ALP SERPL-CCNC: 57 U/L (ref 40–150)
ALP SERPL-CCNC: 63 U/L (ref 40–150)
ALP SERPL-CCNC: 68 U/L (ref 40–150)
ALT SERPL W P-5'-P-CCNC: 10 U/L (ref 0–70)
ALT SERPL W P-5'-P-CCNC: 10 U/L (ref 0–70)
ALT SERPL W P-5'-P-CCNC: 15 U/L (ref 0–70)
ALT SERPL W P-5'-P-CCNC: 20 U/L (ref 0–70)
AMORPH CRY #/AREA URNS HPF: ABNORMAL /HPF
ANION GAP SERPL CALCULATED.3IONS-SCNC: 10 MMOL/L (ref 3–14)
ANION GAP SERPL CALCULATED.3IONS-SCNC: 10 MMOL/L (ref 3–14)
ANION GAP SERPL CALCULATED.3IONS-SCNC: 11 MMOL/L (ref 3–14)
ANION GAP SERPL CALCULATED.3IONS-SCNC: 12 MMOL/L (ref 3–14)
ANION GAP SERPL CALCULATED.3IONS-SCNC: 16 MMOL/L (ref 3–14)
ANION GAP SERPL CALCULATED.3IONS-SCNC: 4 MMOL/L (ref 3–14)
ANION GAP SERPL CALCULATED.3IONS-SCNC: 6 MMOL/L (ref 3–14)
ANION GAP SERPL CALCULATED.3IONS-SCNC: 6 MMOL/L (ref 3–14)
ANION GAP SERPL CALCULATED.3IONS-SCNC: 7 MMOL/L (ref 3–14)
ANION GAP SERPL CALCULATED.3IONS-SCNC: 8 MMOL/L (ref 3–14)
ANION GAP SERPL CALCULATED.3IONS-SCNC: 9 MMOL/L (ref 3–14)
APPEARANCE UR: ABNORMAL
APPEARANCE UR: CLEAR
APTT PPP: 33 SEC (ref 22–37)
AST SERPL W P-5'-P-CCNC: 13 U/L (ref 0–45)
AST SERPL W P-5'-P-CCNC: 22 U/L (ref 0–45)
AST SERPL W P-5'-P-CCNC: 22 U/L (ref 0–45)
AST SERPL W P-5'-P-CCNC: 8 U/L (ref 0–45)
BACTERIA SPEC CULT: NO GROWTH
BACTERIA SPEC CULT: NORMAL
BASE DEFICIT BLDA-SCNC: 9.6 MMOL/L
BASE DEFICIT BLDV-SCNC: 1.1 MMOL/L
BASE DEFICIT BLDV-SCNC: 1.1 MMOL/L
BASE DEFICIT BLDV-SCNC: 1.7 MMOL/L
BASE DEFICIT BLDV-SCNC: 1.8 MMOL/L
BASE EXCESS BLDV CALC-SCNC: 0.3 MMOL/L
BASE EXCESS BLDV CALC-SCNC: 1.7 MMOL/L
BASE EXCESS BLDV CALC-SCNC: 2.4 MMOL/L
BASE EXCESS BLDV CALC-SCNC: 2.6 MMOL/L
BASE EXCESS BLDV CALC-SCNC: 3 MMOL/L
BASE EXCESS BLDV CALC-SCNC: 3.5 MMOL/L
BASOPHILS # BLD AUTO: 0 10E9/L (ref 0–0.2)
BASOPHILS # BLD AUTO: 0 10E9/L (ref 0–0.2)
BASOPHILS NFR BLD AUTO: 0.3 %
BASOPHILS NFR BLD AUTO: 0.3 %
BILIRUB DIRECT SERPL-MCNC: 0.2 MG/DL (ref 0–0.2)
BILIRUB SERPL-MCNC: 0.5 MG/DL (ref 0.2–1.3)
BILIRUB SERPL-MCNC: 0.5 MG/DL (ref 0.2–1.3)
BILIRUB SERPL-MCNC: 0.6 MG/DL (ref 0.2–1.3)
BILIRUB SERPL-MCNC: 0.6 MG/DL (ref 0.2–1.3)
BILIRUB UR QL STRIP: NEGATIVE
BILIRUB UR QL STRIP: NEGATIVE
BLD GP AB SCN SERPL QL: NORMAL
BLOOD BANK CMNT PATIENT-IMP: NORMAL
BUN SERPL-MCNC: 10 MG/DL (ref 7–30)
BUN SERPL-MCNC: 10 MG/DL (ref 7–30)
BUN SERPL-MCNC: 12 MG/DL (ref 7–30)
BUN SERPL-MCNC: 12 MG/DL (ref 7–30)
BUN SERPL-MCNC: 16 MG/DL (ref 7–30)
BUN SERPL-MCNC: 25 MG/DL (ref 7–30)
BUN SERPL-MCNC: 26 MG/DL (ref 7–30)
BUN SERPL-MCNC: 28 MG/DL (ref 7–30)
BUN SERPL-MCNC: 30 MG/DL (ref 7–30)
BUN SERPL-MCNC: 30 MG/DL (ref 7–30)
BUN SERPL-MCNC: 31 MG/DL (ref 7–30)
BUN SERPL-MCNC: 31 MG/DL (ref 7–30)
BUN SERPL-MCNC: 32 MG/DL (ref 7–30)
BUN SERPL-MCNC: 7 MG/DL (ref 7–30)
BUN SERPL-MCNC: 9 MG/DL (ref 7–30)
C COLI+JEJUNI+LARI FUSA STL QL NAA+PROBE: NOT DETECTED
C DIFF TOX B STL QL: NEGATIVE
C DIFF TOX B STL QL: NEGATIVE
CALCIUM SERPL-MCNC: 7.3 MG/DL (ref 8.5–10.1)
CALCIUM SERPL-MCNC: 7.4 MG/DL (ref 8.5–10.1)
CALCIUM SERPL-MCNC: 7.5 MG/DL (ref 8.5–10.1)
CALCIUM SERPL-MCNC: 7.6 MG/DL (ref 8.5–10.1)
CALCIUM SERPL-MCNC: 7.6 MG/DL (ref 8.5–10.1)
CALCIUM SERPL-MCNC: 7.7 MG/DL (ref 8.5–10.1)
CALCIUM SERPL-MCNC: 7.8 MG/DL (ref 8.5–10.1)
CALCIUM SERPL-MCNC: 8 MG/DL (ref 8.5–10.1)
CALCIUM SERPL-MCNC: 8.1 MG/DL (ref 8.5–10.1)
CALCIUM SERPL-MCNC: 8.4 MG/DL (ref 8.5–10.1)
CALCIUM SERPL-MCNC: 9.6 MG/DL (ref 8.5–10.1)
CHLORIDE SERPL-SCNC: 103 MMOL/L (ref 94–109)
CHLORIDE SERPL-SCNC: 108 MMOL/L (ref 94–109)
CHLORIDE SERPL-SCNC: 108 MMOL/L (ref 94–109)
CHLORIDE SERPL-SCNC: 109 MMOL/L (ref 94–109)
CHLORIDE SERPL-SCNC: 110 MMOL/L (ref 94–109)
CHLORIDE SERPL-SCNC: 111 MMOL/L (ref 94–109)
CHLORIDE SERPL-SCNC: 111 MMOL/L (ref 94–109)
CHLORIDE SERPL-SCNC: 112 MMOL/L (ref 94–109)
CHLORIDE SERPL-SCNC: 114 MMOL/L (ref 94–109)
CHLORIDE SERPL-SCNC: 116 MMOL/L (ref 94–109)
CHLORIDE SERPL-SCNC: 118 MMOL/L (ref 94–109)
CO2 SERPL-SCNC: 19 MMOL/L (ref 20–32)
CO2 SERPL-SCNC: 20 MMOL/L (ref 20–32)
CO2 SERPL-SCNC: 21 MMOL/L (ref 20–32)
CO2 SERPL-SCNC: 23 MMOL/L (ref 20–32)
CO2 SERPL-SCNC: 24 MMOL/L (ref 20–32)
CO2 SERPL-SCNC: 25 MMOL/L (ref 20–32)
CO2 SERPL-SCNC: 25 MMOL/L (ref 20–32)
CO2 SERPL-SCNC: 26 MMOL/L (ref 20–32)
CO2 SERPL-SCNC: 27 MMOL/L (ref 20–32)
CO2 SERPL-SCNC: 27 MMOL/L (ref 20–32)
COLOR UR AUTO: ABNORMAL
COLOR UR AUTO: YELLOW
CREAT SERPL-MCNC: 0.91 MG/DL (ref 0.66–1.25)
CREAT SERPL-MCNC: 0.93 MG/DL (ref 0.66–1.25)
CREAT SERPL-MCNC: 0.95 MG/DL (ref 0.66–1.25)
CREAT SERPL-MCNC: 0.96 MG/DL (ref 0.66–1.25)
CREAT SERPL-MCNC: 0.97 MG/DL (ref 0.66–1.25)
CREAT SERPL-MCNC: 0.98 MG/DL (ref 0.66–1.25)
CREAT SERPL-MCNC: 1.02 MG/DL (ref 0.66–1.25)
CREAT SERPL-MCNC: 1.07 MG/DL (ref 0.66–1.25)
CREAT SERPL-MCNC: 1.14 MG/DL (ref 0.66–1.25)
CREAT SERPL-MCNC: 1.35 MG/DL (ref 0.66–1.25)
CREAT SERPL-MCNC: 1.38 MG/DL (ref 0.66–1.25)
CREAT SERPL-MCNC: 1.45 MG/DL (ref 0.66–1.25)
CREAT SERPL-MCNC: 1.52 MG/DL (ref 0.66–1.25)
CREAT SERPL-MCNC: 1.65 MG/DL (ref 0.66–1.25)
CREAT SERPL-MCNC: 1.65 MG/DL (ref 0.66–1.25)
CREAT SERPL-MCNC: 1.73 MG/DL (ref 0.66–1.25)
CREAT SERPL-MCNC: 1.8 MG/DL (ref 0.66–1.25)
DIFFERENTIAL METHOD BLD: ABNORMAL
DIFFERENTIAL METHOD BLD: ABNORMAL
EC STX1 GENE STL QL NAA+PROBE: NOT DETECTED
EC STX2 GENE STL QL NAA+PROBE: NOT DETECTED
ENTERIC PATHOGEN COMMENT: NORMAL
EOSINOPHIL NFR BLD AUTO: 0.8 %
EOSINOPHIL NFR BLD AUTO: 1.3 %
ERYTHROCYTE [DISTWIDTH] IN BLOOD BY AUTOMATED COUNT: 13.8 % (ref 10–15)
ERYTHROCYTE [DISTWIDTH] IN BLOOD BY AUTOMATED COUNT: 13.9 % (ref 10–15)
ERYTHROCYTE [DISTWIDTH] IN BLOOD BY AUTOMATED COUNT: 14.4 % (ref 10–15)
ERYTHROCYTE [DISTWIDTH] IN BLOOD BY AUTOMATED COUNT: 14.4 % (ref 10–15)
ERYTHROCYTE [DISTWIDTH] IN BLOOD BY AUTOMATED COUNT: 14.5 % (ref 10–15)
ERYTHROCYTE [DISTWIDTH] IN BLOOD BY AUTOMATED COUNT: 14.7 % (ref 10–15)
FLUAV+FLUBV AG SPEC QL: NEGATIVE
FLUAV+FLUBV AG SPEC QL: NEGATIVE
GFR SERPL CREATININE-BSD FRML MDRD: 35 ML/MIN/{1.73_M2}
GFR SERPL CREATININE-BSD FRML MDRD: 37 ML/MIN/{1.73_M2}
GFR SERPL CREATININE-BSD FRML MDRD: 39 ML/MIN/{1.73_M2}
GFR SERPL CREATININE-BSD FRML MDRD: 39 ML/MIN/{1.73_M2}
GFR SERPL CREATININE-BSD FRML MDRD: 43 ML/MIN/{1.73_M2}
GFR SERPL CREATININE-BSD FRML MDRD: 46 ML/MIN/{1.73_M2}
GFR SERPL CREATININE-BSD FRML MDRD: 49 ML/MIN/{1.73_M2}
GFR SERPL CREATININE-BSD FRML MDRD: 50 ML/MIN/{1.73_M2}
GFR SERPL CREATININE-BSD FRML MDRD: 61 ML/MIN/{1.73_M2}
GFR SERPL CREATININE-BSD FRML MDRD: 66 ML/MIN/{1.73_M2}
GFR SERPL CREATININE-BSD FRML MDRD: 70 ML/MIN/{1.73_M2}
GFR SERPL CREATININE-BSD FRML MDRD: 74 ML/MIN/{1.73_M2}
GFR SERPL CREATININE-BSD FRML MDRD: 75 ML/MIN/{1.73_M2}
GFR SERPL CREATININE-BSD FRML MDRD: 75 ML/MIN/{1.73_M2}
GFR SERPL CREATININE-BSD FRML MDRD: 77 ML/MIN/{1.73_M2}
GFR SERPL CREATININE-BSD FRML MDRD: 78 ML/MIN/{1.73_M2}
GFR SERPL CREATININE-BSD FRML MDRD: 81 ML/MIN/{1.73_M2}
GLUCOSE BLDC GLUCOMTR-MCNC: 100 MG/DL (ref 70–99)
GLUCOSE BLDC GLUCOMTR-MCNC: 101 MG/DL (ref 70–99)
GLUCOSE BLDC GLUCOMTR-MCNC: 102 MG/DL (ref 70–99)
GLUCOSE BLDC GLUCOMTR-MCNC: 103 MG/DL (ref 70–99)
GLUCOSE BLDC GLUCOMTR-MCNC: 106 MG/DL (ref 70–99)
GLUCOSE BLDC GLUCOMTR-MCNC: 106 MG/DL (ref 70–99)
GLUCOSE BLDC GLUCOMTR-MCNC: 107 MG/DL (ref 70–99)
GLUCOSE BLDC GLUCOMTR-MCNC: 107 MG/DL (ref 70–99)
GLUCOSE BLDC GLUCOMTR-MCNC: 108 MG/DL (ref 70–99)
GLUCOSE BLDC GLUCOMTR-MCNC: 108 MG/DL (ref 70–99)
GLUCOSE BLDC GLUCOMTR-MCNC: 109 MG/DL (ref 70–99)
GLUCOSE BLDC GLUCOMTR-MCNC: 111 MG/DL (ref 70–99)
GLUCOSE BLDC GLUCOMTR-MCNC: 112 MG/DL (ref 70–99)
GLUCOSE BLDC GLUCOMTR-MCNC: 112 MG/DL (ref 70–99)
GLUCOSE BLDC GLUCOMTR-MCNC: 113 MG/DL (ref 70–99)
GLUCOSE BLDC GLUCOMTR-MCNC: 113 MG/DL (ref 70–99)
GLUCOSE BLDC GLUCOMTR-MCNC: 114 MG/DL (ref 70–99)
GLUCOSE BLDC GLUCOMTR-MCNC: 114 MG/DL (ref 70–99)
GLUCOSE BLDC GLUCOMTR-MCNC: 115 MG/DL (ref 70–99)
GLUCOSE BLDC GLUCOMTR-MCNC: 115 MG/DL (ref 70–99)
GLUCOSE BLDC GLUCOMTR-MCNC: 117 MG/DL (ref 70–99)
GLUCOSE BLDC GLUCOMTR-MCNC: 118 MG/DL (ref 70–99)
GLUCOSE BLDC GLUCOMTR-MCNC: 119 MG/DL (ref 70–99)
GLUCOSE BLDC GLUCOMTR-MCNC: 120 MG/DL (ref 70–99)
GLUCOSE BLDC GLUCOMTR-MCNC: 123 MG/DL (ref 70–99)
GLUCOSE BLDC GLUCOMTR-MCNC: 124 MG/DL (ref 70–99)
GLUCOSE BLDC GLUCOMTR-MCNC: 125 MG/DL (ref 70–99)
GLUCOSE BLDC GLUCOMTR-MCNC: 126 MG/DL (ref 70–99)
GLUCOSE BLDC GLUCOMTR-MCNC: 127 MG/DL (ref 70–99)
GLUCOSE BLDC GLUCOMTR-MCNC: 128 MG/DL (ref 70–99)
GLUCOSE BLDC GLUCOMTR-MCNC: 130 MG/DL (ref 70–99)
GLUCOSE BLDC GLUCOMTR-MCNC: 131 MG/DL (ref 70–99)
GLUCOSE BLDC GLUCOMTR-MCNC: 133 MG/DL (ref 70–99)
GLUCOSE BLDC GLUCOMTR-MCNC: 134 MG/DL (ref 70–99)
GLUCOSE BLDC GLUCOMTR-MCNC: 134 MG/DL (ref 70–99)
GLUCOSE BLDC GLUCOMTR-MCNC: 136 MG/DL (ref 70–99)
GLUCOSE BLDC GLUCOMTR-MCNC: 138 MG/DL (ref 70–99)
GLUCOSE BLDC GLUCOMTR-MCNC: 138 MG/DL (ref 70–99)
GLUCOSE BLDC GLUCOMTR-MCNC: 139 MG/DL (ref 70–99)
GLUCOSE BLDC GLUCOMTR-MCNC: 140 MG/DL (ref 70–99)
GLUCOSE BLDC GLUCOMTR-MCNC: 141 MG/DL (ref 70–99)
GLUCOSE BLDC GLUCOMTR-MCNC: 141 MG/DL (ref 70–99)
GLUCOSE BLDC GLUCOMTR-MCNC: 142 MG/DL (ref 70–99)
GLUCOSE BLDC GLUCOMTR-MCNC: 143 MG/DL (ref 70–99)
GLUCOSE BLDC GLUCOMTR-MCNC: 143 MG/DL (ref 70–99)
GLUCOSE BLDC GLUCOMTR-MCNC: 144 MG/DL (ref 70–99)
GLUCOSE BLDC GLUCOMTR-MCNC: 149 MG/DL (ref 70–99)
GLUCOSE BLDC GLUCOMTR-MCNC: 149 MG/DL (ref 70–99)
GLUCOSE BLDC GLUCOMTR-MCNC: 151 MG/DL (ref 70–99)
GLUCOSE BLDC GLUCOMTR-MCNC: 152 MG/DL (ref 70–99)
GLUCOSE BLDC GLUCOMTR-MCNC: 154 MG/DL (ref 70–99)
GLUCOSE BLDC GLUCOMTR-MCNC: 157 MG/DL (ref 70–99)
GLUCOSE BLDC GLUCOMTR-MCNC: 158 MG/DL (ref 70–99)
GLUCOSE BLDC GLUCOMTR-MCNC: 158 MG/DL (ref 70–99)
GLUCOSE BLDC GLUCOMTR-MCNC: 159 MG/DL (ref 70–99)
GLUCOSE BLDC GLUCOMTR-MCNC: 162 MG/DL (ref 70–99)
GLUCOSE BLDC GLUCOMTR-MCNC: 162 MG/DL (ref 70–99)
GLUCOSE BLDC GLUCOMTR-MCNC: 163 MG/DL (ref 70–99)
GLUCOSE BLDC GLUCOMTR-MCNC: 164 MG/DL (ref 70–99)
GLUCOSE BLDC GLUCOMTR-MCNC: 165 MG/DL (ref 70–99)
GLUCOSE BLDC GLUCOMTR-MCNC: 166 MG/DL (ref 70–99)
GLUCOSE BLDC GLUCOMTR-MCNC: 169 MG/DL (ref 70–99)
GLUCOSE BLDC GLUCOMTR-MCNC: 171 MG/DL (ref 70–99)
GLUCOSE BLDC GLUCOMTR-MCNC: 171 MG/DL (ref 70–99)
GLUCOSE BLDC GLUCOMTR-MCNC: 174 MG/DL (ref 70–99)
GLUCOSE BLDC GLUCOMTR-MCNC: 179 MG/DL (ref 70–99)
GLUCOSE BLDC GLUCOMTR-MCNC: 180 MG/DL (ref 70–99)
GLUCOSE BLDC GLUCOMTR-MCNC: 181 MG/DL (ref 70–99)
GLUCOSE BLDC GLUCOMTR-MCNC: 181 MG/DL (ref 70–99)
GLUCOSE BLDC GLUCOMTR-MCNC: 187 MG/DL (ref 70–99)
GLUCOSE BLDC GLUCOMTR-MCNC: 190 MG/DL (ref 70–99)
GLUCOSE BLDC GLUCOMTR-MCNC: 203 MG/DL (ref 70–99)
GLUCOSE BLDC GLUCOMTR-MCNC: 54 MG/DL (ref 70–99)
GLUCOSE BLDC GLUCOMTR-MCNC: 61 MG/DL (ref 70–99)
GLUCOSE BLDC GLUCOMTR-MCNC: 65 MG/DL (ref 70–99)
GLUCOSE BLDC GLUCOMTR-MCNC: 75 MG/DL (ref 70–99)
GLUCOSE BLDC GLUCOMTR-MCNC: 77 MG/DL (ref 70–99)
GLUCOSE BLDC GLUCOMTR-MCNC: 83 MG/DL (ref 70–99)
GLUCOSE BLDC GLUCOMTR-MCNC: 83 MG/DL (ref 70–99)
GLUCOSE BLDC GLUCOMTR-MCNC: 85 MG/DL (ref 70–99)
GLUCOSE BLDC GLUCOMTR-MCNC: 85 MG/DL (ref 70–99)
GLUCOSE BLDC GLUCOMTR-MCNC: 92 MG/DL (ref 70–99)
GLUCOSE BLDC GLUCOMTR-MCNC: 96 MG/DL (ref 70–99)
GLUCOSE BLDC GLUCOMTR-MCNC: 97 MG/DL (ref 70–99)
GLUCOSE BLDC GLUCOMTR-MCNC: 99 MG/DL (ref 70–99)
GLUCOSE BLDC GLUCOMTR-MCNC: 99 MG/DL (ref 70–99)
GLUCOSE SERPL-MCNC: 104 MG/DL (ref 70–99)
GLUCOSE SERPL-MCNC: 113 MG/DL (ref 70–99)
GLUCOSE SERPL-MCNC: 115 MG/DL (ref 70–99)
GLUCOSE SERPL-MCNC: 117 MG/DL (ref 70–99)
GLUCOSE SERPL-MCNC: 119 MG/DL (ref 70–99)
GLUCOSE SERPL-MCNC: 120 MG/DL (ref 70–99)
GLUCOSE SERPL-MCNC: 120 MG/DL (ref 70–99)
GLUCOSE SERPL-MCNC: 124 MG/DL (ref 70–99)
GLUCOSE SERPL-MCNC: 131 MG/DL (ref 70–99)
GLUCOSE SERPL-MCNC: 138 MG/DL (ref 70–99)
GLUCOSE SERPL-MCNC: 148 MG/DL (ref 70–99)
GLUCOSE SERPL-MCNC: 150 MG/DL (ref 70–99)
GLUCOSE SERPL-MCNC: 163 MG/DL (ref 70–99)
GLUCOSE SERPL-MCNC: 236 MG/DL (ref 70–99)
GLUCOSE SERPL-MCNC: 78 MG/DL (ref 70–99)
GLUCOSE SERPL-MCNC: 86 MG/DL (ref 70–99)
GLUCOSE SERPL-MCNC: 91 MG/DL (ref 70–99)
GLUCOSE UR STRIP-MCNC: NEGATIVE MG/DL
GLUCOSE UR STRIP-MCNC: NEGATIVE MG/DL
HBA1C MFR BLD: 6.2 % (ref 0–5.6)
HCO3 BLD-SCNC: 18 MMOL/L (ref 21–28)
HCO3 BLDV-SCNC: 24 MMOL/L (ref 21–28)
HCO3 BLDV-SCNC: 25 MMOL/L (ref 21–28)
HCO3 BLDV-SCNC: 27 MMOL/L (ref 21–28)
HCO3 BLDV-SCNC: 28 MMOL/L (ref 21–28)
HCO3 BLDV-SCNC: 28 MMOL/L (ref 21–28)
HCO3 BLDV-SCNC: 29 MMOL/L (ref 21–28)
HCO3 BLDV-SCNC: 29 MMOL/L (ref 21–28)
HCO3 BLDV-SCNC: 30 MMOL/L (ref 21–28)
HCT VFR BLD AUTO: 31.3 % (ref 40–53)
HCT VFR BLD AUTO: 31.8 % (ref 40–53)
HCT VFR BLD AUTO: 32.1 % (ref 40–53)
HCT VFR BLD AUTO: 35.8 % (ref 40–53)
HCT VFR BLD AUTO: 37.4 % (ref 40–53)
HCT VFR BLD AUTO: 46.5 % (ref 40–53)
HEMOCCULT STL QL: POSITIVE
HGB BLD-MCNC: 10 G/DL (ref 13.3–17.7)
HGB BLD-MCNC: 10.1 G/DL (ref 13.3–17.7)
HGB BLD-MCNC: 10.2 G/DL (ref 13.3–17.7)
HGB BLD-MCNC: 10.3 G/DL (ref 13.3–17.7)
HGB BLD-MCNC: 10.4 G/DL (ref 13.3–17.7)
HGB BLD-MCNC: 10.6 G/DL (ref 13.3–17.7)
HGB BLD-MCNC: 10.7 G/DL (ref 13.3–17.7)
HGB BLD-MCNC: 10.8 G/DL (ref 13.3–17.7)
HGB BLD-MCNC: 10.9 G/DL (ref 13.3–17.7)
HGB BLD-MCNC: 11 G/DL (ref 13.3–17.7)
HGB BLD-MCNC: 11.4 G/DL (ref 13.3–17.7)
HGB BLD-MCNC: 11.8 G/DL (ref 13.3–17.7)
HGB BLD-MCNC: 12.2 G/DL (ref 13.3–17.7)
HGB BLD-MCNC: 14.8 G/DL (ref 13.3–17.7)
HGB BLD-MCNC: 9.6 G/DL (ref 13.3–17.7)
HGB BLD-MCNC: 9.7 G/DL (ref 13.3–17.7)
HGB BLD-MCNC: 9.9 G/DL (ref 13.3–17.7)
HGB UR QL STRIP: ABNORMAL
HGB UR QL STRIP: NEGATIVE
HYALINE CASTS #/AREA URNS LPF: 11 /LPF (ref 0–2)
IMM GRANULOCYTES # BLD: 0 10E9/L (ref 0–0.4)
IMM GRANULOCYTES # BLD: 0.1 10E9/L (ref 0–0.4)
IMM GRANULOCYTES NFR BLD: 0.4 %
IMM GRANULOCYTES NFR BLD: 0.4 %
INR PPP: 1.24 (ref 0.86–1.14)
KETONES UR STRIP-MCNC: NEGATIVE MG/DL
KETONES UR STRIP-MCNC: NEGATIVE MG/DL
LACTATE BLD-SCNC: 0.7 MMOL/L (ref 0.7–2)
LACTATE BLD-SCNC: 0.9 MMOL/L (ref 0.7–2)
LACTATE BLD-SCNC: 0.9 MMOL/L (ref 0.7–2)
LACTATE BLD-SCNC: 1.4 MMOL/L (ref 0.7–2)
LACTATE BLD-SCNC: 2.2 MMOL/L (ref 0.7–2)
LACTATE BLD-SCNC: 2.2 MMOL/L (ref 0.7–2)
LACTATE BLD-SCNC: 2.3 MMOL/L (ref 0.7–2)
LACTATE BLD-SCNC: 2.8 MMOL/L (ref 0.7–2)
LACTATE BLD-SCNC: 3.1 MMOL/L (ref 0.7–2)
LACTATE BLD-SCNC: 4.9 MMOL/L (ref 0.7–2)
LACTATE BLD-SCNC: 6.2 MMOL/L (ref 0.7–2)
LACTATE BLD-SCNC: 6.4 MMOL/L (ref 0.7–2)
LACTATE BLD-SCNC: <0.4 MMOL/L (ref 0.7–2)
LEUKOCYTE ESTERASE UR QL STRIP: ABNORMAL
LEUKOCYTE ESTERASE UR QL STRIP: ABNORMAL
LYMPHOCYTES # BLD AUTO: 1.1 10E9/L (ref 0.8–5.3)
LYMPHOCYTES # BLD AUTO: 2.8 10E9/L (ref 0.8–5.3)
LYMPHOCYTES NFR BLD AUTO: 11.6 %
LYMPHOCYTES NFR BLD AUTO: 20.4 %
Lab: NORMAL
MAGNESIUM SERPL-MCNC: 1.5 MG/DL (ref 1.6–2.3)
MAGNESIUM SERPL-MCNC: 1.7 MG/DL (ref 1.6–2.3)
MAGNESIUM SERPL-MCNC: 2 MG/DL (ref 1.6–2.3)
MAGNESIUM SERPL-MCNC: 2.1 MG/DL (ref 1.6–2.3)
MAGNESIUM SERPL-MCNC: 2.2 MG/DL (ref 1.6–2.3)
MAGNESIUM SERPL-MCNC: 2.3 MG/DL (ref 1.6–2.3)
MAGNESIUM SERPL-MCNC: 2.6 MG/DL (ref 1.6–2.3)
MCH RBC QN AUTO: 29.3 PG (ref 26.5–33)
MCH RBC QN AUTO: 29.3 PG (ref 26.5–33)
MCH RBC QN AUTO: 29.4 PG (ref 26.5–33)
MCH RBC QN AUTO: 29.7 PG (ref 26.5–33)
MCH RBC QN AUTO: 29.8 PG (ref 26.5–33)
MCH RBC QN AUTO: 29.8 PG (ref 26.5–33)
MCHC RBC AUTO-ENTMCNC: 31.1 G/DL (ref 31.5–36.5)
MCHC RBC AUTO-ENTMCNC: 31.5 G/DL (ref 31.5–36.5)
MCHC RBC AUTO-ENTMCNC: 31.6 G/DL (ref 31.5–36.5)
MCHC RBC AUTO-ENTMCNC: 31.6 G/DL (ref 31.5–36.5)
MCHC RBC AUTO-ENTMCNC: 31.8 G/DL (ref 31.5–36.5)
MCHC RBC AUTO-ENTMCNC: 31.8 G/DL (ref 31.5–36.5)
MCV RBC AUTO: 93 FL (ref 78–100)
MCV RBC AUTO: 94 FL (ref 78–100)
MONOCYTES # BLD AUTO: 0.3 10E9/L (ref 0–1.3)
MONOCYTES # BLD AUTO: 0.8 10E9/L (ref 0–1.3)
MONOCYTES NFR BLD AUTO: 1.8 %
MONOCYTES NFR BLD AUTO: 8.1 %
MUCOUS THREADS #/AREA URNS LPF: PRESENT /LPF
NEUTROPHILS # BLD AUTO: 10.5 10E9/L (ref 1.6–8.3)
NEUTROPHILS # BLD AUTO: 7.6 10E9/L (ref 1.6–8.3)
NEUTROPHILS NFR BLD AUTO: 76.3 %
NEUTROPHILS NFR BLD AUTO: 78.3 %
NITRATE UR QL: NEGATIVE
NITRATE UR QL: NEGATIVE
NOROV GI+II ORF1-ORF2 JNC STL QL NAA+PR: NOT DETECTED
NRBC # BLD AUTO: 0 10*3/UL
NRBC # BLD AUTO: 0 10*3/UL
NRBC BLD AUTO-RTO: 0 /100
NRBC BLD AUTO-RTO: 0 /100
O2/TOTAL GAS SETTING VFR VENT: 21 %
O2/TOTAL GAS SETTING VFR VENT: 24 %
O2/TOTAL GAS SETTING VFR VENT: 24 %
O2/TOTAL GAS SETTING VFR VENT: 28 %
PCO2 BLD: 43 MM HG (ref 35–45)
PCO2 BLDV: 44 MM HG (ref 40–50)
PCO2 BLDV: 46 MM HG (ref 40–50)
PCO2 BLDV: 47 MM HG (ref 40–50)
PCO2 BLDV: 49 MM HG (ref 40–50)
PCO2 BLDV: 50 MM HG (ref 40–50)
PCO2 BLDV: 51 MM HG (ref 40–50)
PCO2 BLDV: 52 MM HG (ref 40–50)
PCO2 BLDV: 53 MM HG (ref 40–50)
PH BLD: 7.22 PH (ref 7.35–7.45)
PH BLDV: 7.32 PH (ref 7.32–7.43)
PH BLDV: 7.33 PH (ref 7.32–7.43)
PH BLDV: 7.34 PH (ref 7.32–7.43)
PH BLDV: 7.34 PH (ref 7.32–7.43)
PH BLDV: 7.35 PH (ref 7.32–7.43)
PH BLDV: 7.36 PH (ref 7.32–7.43)
PH BLDV: 7.37 PH (ref 7.32–7.43)
PH BLDV: 7.4 PH (ref 7.32–7.43)
PH UR STRIP: 5 PH (ref 5–7)
PH UR STRIP: 5 PH (ref 5–7)
PHOSPHATE SERPL-MCNC: 2.1 MG/DL (ref 2.5–4.5)
PHOSPHATE SERPL-MCNC: 2.5 MG/DL (ref 2.5–4.5)
PLATELET # BLD AUTO: 172 10E9/L (ref 150–450)
PLATELET # BLD AUTO: 199 10E9/L (ref 150–450)
PLATELET # BLD AUTO: 207 10E9/L (ref 150–450)
PLATELET # BLD AUTO: 266 10E9/L (ref 150–450)
PLATELET # BLD AUTO: 271 10E9/L (ref 150–450)
PLATELET # BLD AUTO: 283 10E9/L (ref 150–450)
PLATELET # BLD AUTO: 310 10E9/L (ref 150–450)
PLATELET # BLD AUTO: 321 10E9/L (ref 150–450)
PO2 BLD: 98 MM HG (ref 80–105)
PO2 BLDV: 29 MM HG (ref 25–47)
PO2 BLDV: 33 MM HG (ref 25–47)
PO2 BLDV: 33 MM HG (ref 25–47)
PO2 BLDV: 34 MM HG (ref 25–47)
PO2 BLDV: 35 MM HG (ref 25–47)
PO2 BLDV: 37 MM HG (ref 25–47)
PO2 BLDV: 39 MM HG (ref 25–47)
PO2 BLDV: 41 MM HG (ref 25–47)
POTASSIUM SERPL-SCNC: 3.1 MMOL/L (ref 3.4–5.3)
POTASSIUM SERPL-SCNC: 3.1 MMOL/L (ref 3.4–5.3)
POTASSIUM SERPL-SCNC: 3.3 MMOL/L (ref 3.4–5.3)
POTASSIUM SERPL-SCNC: 3.4 MMOL/L (ref 3.4–5.3)
POTASSIUM SERPL-SCNC: 3.5 MMOL/L (ref 3.4–5.3)
POTASSIUM SERPL-SCNC: 3.6 MMOL/L (ref 3.4–5.3)
POTASSIUM SERPL-SCNC: 3.7 MMOL/L (ref 3.4–5.3)
POTASSIUM SERPL-SCNC: 3.8 MMOL/L (ref 3.4–5.3)
POTASSIUM SERPL-SCNC: 3.8 MMOL/L (ref 3.4–5.3)
POTASSIUM SERPL-SCNC: 4.1 MMOL/L (ref 3.4–5.3)
POTASSIUM SERPL-SCNC: 4.3 MMOL/L (ref 3.4–5.3)
POTASSIUM SERPL-SCNC: 4.4 MMOL/L (ref 3.4–5.3)
POTASSIUM SERPL-SCNC: 4.7 MMOL/L (ref 3.4–5.3)
POTASSIUM SERPL-SCNC: 4.7 MMOL/L (ref 3.4–5.3)
PROCALCITONIN SERPL-MCNC: 0.24 NG/ML
PROCALCITONIN SERPL-MCNC: 0.31 NG/ML
PROCALCITONIN SERPL-MCNC: 0.36 NG/ML
PROCALCITONIN SERPL-MCNC: 0.52 NG/ML
PROCALCITONIN SERPL-MCNC: 17.49 NG/ML
PROCALCITONIN SERPL-MCNC: 2.3 NG/ML
PROCALCITONIN SERPL-MCNC: 21.77 NG/ML
PROCALCITONIN SERPL-MCNC: 5.23 NG/ML
PROT SERPL-MCNC: 5.2 G/DL (ref 6.8–8.8)
PROT SERPL-MCNC: 5.7 G/DL (ref 6.8–8.8)
PROT SERPL-MCNC: 6 G/DL (ref 6.8–8.8)
PROT SERPL-MCNC: 8 G/DL (ref 6.8–8.8)
RBC # BLD AUTO: 3.32 10E12/L (ref 4.4–5.9)
RBC # BLD AUTO: 3.38 10E12/L (ref 4.4–5.9)
RBC # BLD AUTO: 3.45 10E12/L (ref 4.4–5.9)
RBC # BLD AUTO: 3.84 10E12/L (ref 4.4–5.9)
RBC # BLD AUTO: 4.01 10E12/L (ref 4.4–5.9)
RBC # BLD AUTO: 4.96 10E12/L (ref 4.4–5.9)
RBC #/AREA URNS AUTO: 126 /HPF (ref 0–2)
RBC #/AREA URNS AUTO: <1 /HPF (ref 0–2)
RVA NSP5 STL QL NAA+PROBE: NOT DETECTED
SALMONELLA SP RPOD STL QL NAA+PROBE: NOT DETECTED
SHIGELLA SP+EIEC IPAH STL QL NAA+PROBE: NOT DETECTED
SODIUM SERPL-SCNC: 138 MMOL/L (ref 133–144)
SODIUM SERPL-SCNC: 140 MMOL/L (ref 133–144)
SODIUM SERPL-SCNC: 140 MMOL/L (ref 133–144)
SODIUM SERPL-SCNC: 141 MMOL/L (ref 133–144)
SODIUM SERPL-SCNC: 142 MMOL/L (ref 133–144)
SODIUM SERPL-SCNC: 143 MMOL/L (ref 133–144)
SODIUM SERPL-SCNC: 144 MMOL/L (ref 133–144)
SODIUM SERPL-SCNC: 145 MMOL/L (ref 133–144)
SODIUM SERPL-SCNC: 145 MMOL/L (ref 133–144)
SODIUM SERPL-SCNC: 146 MMOL/L (ref 133–144)
SOURCE: ABNORMAL
SOURCE: ABNORMAL
SP GR UR STRIP: 1.02 (ref 1–1.03)
SP GR UR STRIP: 1.03 (ref 1–1.03)
SPECIMEN EXP DATE BLD: NORMAL
SPECIMEN SOURCE: NORMAL
TROPONIN I SERPL-MCNC: <0.015 UG/L (ref 0–0.04)
UROBILINOGEN UR STRIP-MCNC: 0 MG/DL (ref 0–2)
UROBILINOGEN UR STRIP-MCNC: 2 MG/DL (ref 0–2)
V CHOL+PARA RFBL+TRKH+TNAA STL QL NAA+PR: NOT DETECTED
VANCOMYCIN SERPL-MCNC: 27.3 MG/L
VANCOMYCIN SERPL-MCNC: 33 MG/L
VANCOMYCIN SERPL-MCNC: 40.1 MG/L
WBC # BLD AUTO: 11.1 10E9/L (ref 4–11)
WBC # BLD AUTO: 12.5 10E9/L (ref 4–11)
WBC # BLD AUTO: 13.7 10E9/L (ref 4–11)
WBC # BLD AUTO: 19.8 10E9/L (ref 4–11)
WBC # BLD AUTO: 27 10E9/L (ref 4–11)
WBC # BLD AUTO: 7.8 10E9/L (ref 4–11)
WBC # BLD AUTO: 8.5 10E9/L (ref 4–11)
WBC # BLD AUTO: 9 10E9/L (ref 4–11)
WBC # BLD AUTO: 9.6 10E9/L (ref 4–11)
WBC # BLD AUTO: 9.8 10E9/L (ref 4–11)
WBC #/AREA URNS AUTO: 2 /HPF (ref 0–5)
WBC #/AREA URNS AUTO: 4 /HPF (ref 0–5)
Y ENTERO RECN STL QL NAA+PROBE: NOT DETECTED

## 2019-01-01 PROCEDURE — 80048 BASIC METABOLIC PNL TOTAL CA: CPT | Performed by: FAMILY MEDICINE

## 2019-01-01 PROCEDURE — 80053 COMPREHEN METABOLIC PANEL: CPT | Performed by: PEDIATRICS

## 2019-01-01 PROCEDURE — 25000132 ZZH RX MED GY IP 250 OP 250 PS 637: Performed by: PEDIATRICS

## 2019-01-01 PROCEDURE — 25000128 H RX IP 250 OP 636: Performed by: PEDIATRICS

## 2019-01-01 PROCEDURE — 87040 BLOOD CULTURE FOR BACTERIA: CPT | Performed by: EMERGENCY MEDICINE

## 2019-01-01 PROCEDURE — 25000128 H RX IP 250 OP 636: Performed by: EMERGENCY MEDICINE

## 2019-01-01 PROCEDURE — 25000132 ZZH RX MED GY IP 250 OP 250 PS 637: Performed by: FAMILY MEDICINE

## 2019-01-01 PROCEDURE — 99232 SBSQ HOSP IP/OBS MODERATE 35: CPT | Performed by: PEDIATRICS

## 2019-01-01 PROCEDURE — 25000131 ZZH RX MED GY IP 250 OP 636 PS 637: Performed by: PEDIATRICS

## 2019-01-01 PROCEDURE — 82803 BLOOD GASES ANY COMBINATION: CPT | Performed by: FAMILY MEDICINE

## 2019-01-01 PROCEDURE — 0298T ZZC EXT ECG > 48HR TO 21 DAY REVIEW AND INTERPRETATN: CPT | Performed by: INTERNAL MEDICINE

## 2019-01-01 PROCEDURE — 97162 PT EVAL MOD COMPLEX 30 MIN: CPT | Mod: GP | Performed by: PHYSICAL THERAPIST

## 2019-01-01 PROCEDURE — C9113 INJ PANTOPRAZOLE SODIUM, VIA: HCPCS | Performed by: PEDIATRICS

## 2019-01-01 PROCEDURE — 96365 THER/PROPH/DIAG IV INF INIT: CPT

## 2019-01-01 PROCEDURE — 83605 ASSAY OF LACTIC ACID: CPT | Performed by: FAMILY MEDICINE

## 2019-01-01 PROCEDURE — 93005 ELECTROCARDIOGRAM TRACING: CPT

## 2019-01-01 PROCEDURE — 97530 THERAPEUTIC ACTIVITIES: CPT | Mod: GP | Performed by: PHYSICAL THERAPIST

## 2019-01-01 PROCEDURE — 80202 ASSAY OF VANCOMYCIN: CPT | Performed by: PEDIATRICS

## 2019-01-01 PROCEDURE — 00000146 ZZHCL STATISTIC GLUCOSE BY METER IP

## 2019-01-01 PROCEDURE — 80048 BASIC METABOLIC PNL TOTAL CA: CPT | Performed by: PEDIATRICS

## 2019-01-01 PROCEDURE — 25800029 ZZH RX IP 258 OP 250: Performed by: PEDIATRICS

## 2019-01-01 PROCEDURE — 82803 BLOOD GASES ANY COMBINATION: CPT | Performed by: PEDIATRICS

## 2019-01-01 PROCEDURE — 81001 URINALYSIS AUTO W/SCOPE: CPT | Performed by: EMERGENCY MEDICINE

## 2019-01-01 PROCEDURE — 87086 URINE CULTURE/COLONY COUNT: CPT | Performed by: EMERGENCY MEDICINE

## 2019-01-01 PROCEDURE — 25800030 ZZH RX IP 258 OP 636: Performed by: PEDIATRICS

## 2019-01-01 PROCEDURE — 25800025 ZZH RX 258: Performed by: PEDIATRICS

## 2019-01-01 PROCEDURE — 80048 BASIC METABOLIC PNL TOTAL CA: CPT | Performed by: HOSPITALIST

## 2019-01-01 PROCEDURE — 25000128 H RX IP 250 OP 636: Performed by: FAMILY MEDICINE

## 2019-01-01 PROCEDURE — 85018 HEMOGLOBIN: CPT | Performed by: FAMILY MEDICINE

## 2019-01-01 PROCEDURE — 99231 SBSQ HOSP IP/OBS SF/LOW 25: CPT | Performed by: SURGERY

## 2019-01-01 PROCEDURE — 99291 CRITICAL CARE FIRST HOUR: CPT | Performed by: FAMILY MEDICINE

## 2019-01-01 PROCEDURE — 12000000 ZZH R&B MED SURG/OB

## 2019-01-01 PROCEDURE — 25800025 ZZH RX 258: Performed by: HOSPITALIST

## 2019-01-01 PROCEDURE — 80051 ELECTROLYTE PANEL: CPT | Performed by: PEDIATRICS

## 2019-01-01 PROCEDURE — 97110 THERAPEUTIC EXERCISES: CPT | Mod: GP | Performed by: PHYSICAL THERAPIST

## 2019-01-01 PROCEDURE — 83735 ASSAY OF MAGNESIUM: CPT | Performed by: PEDIATRICS

## 2019-01-01 PROCEDURE — 84145 PROCALCITONIN (PCT): CPT | Performed by: FAMILY MEDICINE

## 2019-01-01 PROCEDURE — 99291 CRITICAL CARE FIRST HOUR: CPT | Performed by: PEDIATRICS

## 2019-01-01 PROCEDURE — 99222 1ST HOSP IP/OBS MODERATE 55: CPT | Performed by: SURGERY

## 2019-01-01 PROCEDURE — 36600 WITHDRAWAL OF ARTERIAL BLOOD: CPT

## 2019-01-01 PROCEDURE — 85018 HEMOGLOBIN: CPT | Performed by: PEDIATRICS

## 2019-01-01 PROCEDURE — 93010 ELECTROCARDIOGRAM REPORT: CPT | Mod: Z6 | Performed by: EMERGENCY MEDICINE

## 2019-01-01 PROCEDURE — 85048 AUTOMATED LEUKOCYTE COUNT: CPT | Performed by: FAMILY MEDICINE

## 2019-01-01 PROCEDURE — 25000131 ZZH RX MED GY IP 250 OP 636 PS 637: Performed by: FAMILY MEDICINE

## 2019-01-01 PROCEDURE — 20000003 ZZH R&B ICU

## 2019-01-01 PROCEDURE — 25500064 ZZH RX 255 OP 636: Performed by: INTERNAL MEDICINE

## 2019-01-01 PROCEDURE — 99233 SBSQ HOSP IP/OBS HIGH 50: CPT | Performed by: HOSPITALIST

## 2019-01-01 PROCEDURE — 25000125 ZZHC RX 250: Performed by: PEDIATRICS

## 2019-01-01 PROCEDURE — 85027 COMPLETE CBC AUTOMATED: CPT | Performed by: FAMILY MEDICINE

## 2019-01-01 PROCEDURE — 84484 ASSAY OF TROPONIN QUANT: CPT | Performed by: EMERGENCY MEDICINE

## 2019-01-01 PROCEDURE — 86901 BLOOD TYPING SEROLOGIC RH(D): CPT | Performed by: FAMILY MEDICINE

## 2019-01-01 PROCEDURE — 83735 ASSAY OF MAGNESIUM: CPT | Performed by: FAMILY MEDICINE

## 2019-01-01 PROCEDURE — 86850 RBC ANTIBODY SCREEN: CPT | Performed by: FAMILY MEDICINE

## 2019-01-01 PROCEDURE — 70450 CT HEAD/BRAIN W/O DYE: CPT

## 2019-01-01 PROCEDURE — 87081 CULTURE SCREEN ONLY: CPT | Performed by: PEDIATRICS

## 2019-01-01 PROCEDURE — 85610 PROTHROMBIN TIME: CPT | Performed by: EMERGENCY MEDICINE

## 2019-01-01 PROCEDURE — 80076 HEPATIC FUNCTION PANEL: CPT | Performed by: EMERGENCY MEDICINE

## 2019-01-01 PROCEDURE — 99309 SBSQ NF CARE MODERATE MDM 30: CPT | Performed by: NURSE PRACTITIONER

## 2019-01-01 PROCEDURE — C9113 INJ PANTOPRAZOLE SODIUM, VIA: HCPCS | Performed by: FAMILY MEDICINE

## 2019-01-01 PROCEDURE — 40000894 ZZH STATISTIC OT IP EVAL DEFER

## 2019-01-01 PROCEDURE — 36415 COLL VENOUS BLD VENIPUNCTURE: CPT | Performed by: FAMILY MEDICINE

## 2019-01-01 PROCEDURE — 92610 EVALUATE SWALLOWING FUNCTION: CPT | Mod: GN | Performed by: SPEECH-LANGUAGE PATHOLOGIST

## 2019-01-01 PROCEDURE — 71045 X-RAY EXAM CHEST 1 VIEW: CPT | Mod: TC,76

## 2019-01-01 PROCEDURE — 99239 HOSP IP/OBS DSCHRG MGMT >30: CPT | Performed by: PEDIATRICS

## 2019-01-01 PROCEDURE — 80053 COMPREHEN METABOLIC PANEL: CPT | Performed by: FAMILY MEDICINE

## 2019-01-01 PROCEDURE — 84132 ASSAY OF SERUM POTASSIUM: CPT | Performed by: FAMILY MEDICINE

## 2019-01-01 PROCEDURE — 96368 THER/DIAG CONCURRENT INF: CPT

## 2019-01-01 PROCEDURE — 84100 ASSAY OF PHOSPHORUS: CPT | Performed by: HOSPITALIST

## 2019-01-01 PROCEDURE — 99232 SBSQ HOSP IP/OBS MODERATE 35: CPT | Performed by: SURGERY

## 2019-01-01 PROCEDURE — 85027 COMPLETE CBC AUTOMATED: CPT | Performed by: PEDIATRICS

## 2019-01-01 PROCEDURE — 83605 ASSAY OF LACTIC ACID: CPT | Performed by: PEDIATRICS

## 2019-01-01 PROCEDURE — 84145 PROCALCITONIN (PCT): CPT | Performed by: PEDIATRICS

## 2019-01-01 PROCEDURE — 84132 ASSAY OF SERUM POTASSIUM: CPT | Performed by: PEDIATRICS

## 2019-01-01 PROCEDURE — 96366 THER/PROPH/DIAG IV INF ADDON: CPT

## 2019-01-01 PROCEDURE — 36416 COLLJ CAPILLARY BLOOD SPEC: CPT | Performed by: PEDIATRICS

## 2019-01-01 PROCEDURE — 25800030 ZZH RX IP 258 OP 636: Performed by: HOSPITALIST

## 2019-01-01 PROCEDURE — 99291 CRITICAL CARE FIRST HOUR: CPT | Mod: 25

## 2019-01-01 PROCEDURE — 3E043XZ INTRODUCTION OF VASOPRESSOR INTO CENTRAL VEIN, PERCUTANEOUS APPROACH: ICD-10-PCS | Performed by: FAMILY MEDICINE

## 2019-01-01 PROCEDURE — 74176 CT ABD & PELVIS W/O CONTRAST: CPT

## 2019-01-01 PROCEDURE — 93308 TTE F-UP OR LMTD: CPT

## 2019-01-01 PROCEDURE — 99233 SBSQ HOSP IP/OBS HIGH 50: CPT | Performed by: FAMILY MEDICINE

## 2019-01-01 PROCEDURE — 87493 C DIFF AMPLIFIED PROBE: CPT | Performed by: FAMILY MEDICINE

## 2019-01-01 PROCEDURE — 99232 SBSQ HOSP IP/OBS MODERATE 35: CPT | Performed by: FAMILY MEDICINE

## 2019-01-01 PROCEDURE — 86900 BLOOD TYPING SEROLOGIC ABO: CPT | Performed by: FAMILY MEDICINE

## 2019-01-01 PROCEDURE — 83605 ASSAY OF LACTIC ACID: CPT | Performed by: EMERGENCY MEDICINE

## 2019-01-01 PROCEDURE — 83036 HEMOGLOBIN GLYCOSYLATED A1C: CPT | Performed by: PEDIATRICS

## 2019-01-01 PROCEDURE — 40000264 ECHOCARDIOGRAM COMPLETE

## 2019-01-01 PROCEDURE — 99291 CRITICAL CARE FIRST HOUR: CPT | Mod: 25 | Performed by: EMERGENCY MEDICINE

## 2019-01-01 PROCEDURE — 25000125 ZZHC RX 250: Performed by: HOSPITALIST

## 2019-01-01 PROCEDURE — 99292 CRITICAL CARE ADDL 30 MIN: CPT

## 2019-01-01 PROCEDURE — 85049 AUTOMATED PLATELET COUNT: CPT | Performed by: PEDIATRICS

## 2019-01-01 PROCEDURE — 85027 COMPLETE CBC AUTOMATED: CPT | Performed by: HOSPITALIST

## 2019-01-01 PROCEDURE — 85025 COMPLETE CBC W/AUTO DIFF WBC: CPT | Performed by: HOSPITALIST

## 2019-01-01 PROCEDURE — 82272 OCCULT BLD FECES 1-3 TESTS: CPT | Performed by: EMERGENCY MEDICINE

## 2019-01-01 PROCEDURE — 25800029 ZZH RX IP 258 OP 250: Performed by: FAMILY MEDICINE

## 2019-01-01 PROCEDURE — 70498 CT ANGIOGRAPHY NECK: CPT

## 2019-01-01 PROCEDURE — 85048 AUTOMATED LEUKOCYTE COUNT: CPT | Performed by: PEDIATRICS

## 2019-01-01 PROCEDURE — 74018 RADEX ABDOMEN 1 VIEW: CPT | Mod: TC

## 2019-01-01 PROCEDURE — 40000275 ZZH STATISTIC RCP TIME EA 10 MIN

## 2019-01-01 PROCEDURE — 87040 BLOOD CULTURE FOR BACTERIA: CPT | Performed by: FAMILY MEDICINE

## 2019-01-01 PROCEDURE — 81001 URINALYSIS AUTO W/SCOPE: CPT | Performed by: FAMILY MEDICINE

## 2019-01-01 PROCEDURE — 80048 BASIC METABOLIC PNL TOTAL CA: CPT | Performed by: EMERGENCY MEDICINE

## 2019-01-01 PROCEDURE — 87506 IADNA-DNA/RNA PROBE TQ 6-11: CPT | Performed by: EMERGENCY MEDICINE

## 2019-01-01 PROCEDURE — 0296T ZIO PATCH HOLTER ADULT PEDIATRIC GREATER THAN 48 HRS: CPT

## 2019-01-01 PROCEDURE — 85730 THROMBOPLASTIN TIME PARTIAL: CPT | Performed by: EMERGENCY MEDICINE

## 2019-01-01 PROCEDURE — 87804 INFLUENZA ASSAY W/OPTIC: CPT | Performed by: EMERGENCY MEDICINE

## 2019-01-01 PROCEDURE — 71045 X-RAY EXAM CHEST 1 VIEW: CPT | Mod: TC

## 2019-01-01 PROCEDURE — 85025 COMPLETE CBC W/AUTO DIFF WBC: CPT | Performed by: EMERGENCY MEDICINE

## 2019-01-01 PROCEDURE — 99305 1ST NF CARE MODERATE MDM 35: CPT | Performed by: FAMILY MEDICINE

## 2019-01-01 PROCEDURE — 83735 ASSAY OF MAGNESIUM: CPT | Performed by: HOSPITALIST

## 2019-01-01 PROCEDURE — 74177 CT ABD & PELVIS W/CONTRAST: CPT

## 2019-01-01 PROCEDURE — 97165 OT EVAL LOW COMPLEX 30 MIN: CPT | Mod: GO

## 2019-01-01 PROCEDURE — 85049 AUTOMATED PLATELET COUNT: CPT | Performed by: FAMILY MEDICINE

## 2019-01-01 PROCEDURE — 93306 TTE W/DOPPLER COMPLETE: CPT | Mod: 26 | Performed by: INTERNAL MEDICINE

## 2019-01-01 PROCEDURE — 25800030 ZZH RX IP 258 OP 636: Performed by: EMERGENCY MEDICINE

## 2019-01-01 PROCEDURE — 25000125 ZZHC RX 250: Performed by: EMERGENCY MEDICINE

## 2019-01-01 PROCEDURE — 84100 ASSAY OF PHOSPHORUS: CPT | Performed by: FAMILY MEDICINE

## 2019-01-01 PROCEDURE — 25000125 ZZHC RX 250: Performed by: FAMILY MEDICINE

## 2019-01-01 RX ORDER — SODIUM CHLORIDE, SODIUM LACTATE, POTASSIUM CHLORIDE, CALCIUM CHLORIDE 600; 310; 30; 20 MG/100ML; MG/100ML; MG/100ML; MG/100ML
INJECTION, SOLUTION INTRAVENOUS CONTINUOUS
Status: DISCONTINUED | OUTPATIENT
Start: 2019-01-01 | End: 2019-01-01

## 2019-01-01 RX ORDER — HEPARIN SODIUM,PORCINE 10 UNIT/ML
5-10 VIAL (ML) INTRAVENOUS
Status: DISCONTINUED | OUTPATIENT
Start: 2019-01-01 | End: 2019-01-01

## 2019-01-01 RX ORDER — NICOTINE POLACRILEX 4 MG
15-30 LOZENGE BUCCAL
Status: DISCONTINUED | OUTPATIENT
Start: 2019-01-01 | End: 2019-01-01 | Stop reason: HOSPADM

## 2019-01-01 RX ORDER — PROCHLORPERAZINE 25 MG
12.5 SUPPOSITORY, RECTAL RECTAL EVERY 12 HOURS PRN
Status: DISCONTINUED | OUTPATIENT
Start: 2019-01-01 | End: 2019-01-01 | Stop reason: HOSPADM

## 2019-01-01 RX ORDER — LIDOCAINE 40 MG/G
CREAM TOPICAL
Status: DISCONTINUED | OUTPATIENT
Start: 2019-01-01 | End: 2019-01-01 | Stop reason: HOSPADM

## 2019-01-01 RX ORDER — SODIUM CHLORIDE 9 MG/ML
INJECTION, SOLUTION INTRAVENOUS CONTINUOUS
Status: DISCONTINUED | OUTPATIENT
Start: 2019-01-01 | End: 2019-01-01

## 2019-01-01 RX ORDER — POTASSIUM CL/LIDO/0.9 % NACL 10MEQ/0.1L
10 INTRAVENOUS SOLUTION, PIGGYBACK (ML) INTRAVENOUS
Status: DISCONTINUED | OUTPATIENT
Start: 2019-01-01 | End: 2019-01-01 | Stop reason: HOSPADM

## 2019-01-01 RX ORDER — IOPAMIDOL 755 MG/ML
500 INJECTION, SOLUTION INTRAVASCULAR ONCE
Status: COMPLETED | OUTPATIENT
Start: 2019-01-01 | End: 2019-01-01

## 2019-01-01 RX ORDER — POTASSIUM CHLORIDE 1500 MG/1
20-40 TABLET, EXTENDED RELEASE ORAL
Status: DISCONTINUED | OUTPATIENT
Start: 2019-01-01 | End: 2019-01-01

## 2019-01-01 RX ORDER — POTASSIUM CL/LIDO/0.9 % NACL 10MEQ/0.1L
10 INTRAVENOUS SOLUTION, PIGGYBACK (ML) INTRAVENOUS
Status: DISCONTINUED | OUTPATIENT
Start: 2019-01-01 | End: 2019-01-01

## 2019-01-01 RX ORDER — POTASSIUM CHLORIDE 1500 MG/1
40 TABLET, EXTENDED RELEASE ORAL 2 TIMES DAILY
DISCHARGE
Start: 2019-01-01

## 2019-01-01 RX ORDER — HYDROCORTISONE 2.5 %
CREAM (GRAM) TOPICAL 2 TIMES DAILY
COMMUNITY

## 2019-01-01 RX ORDER — POTASSIUM CHLORIDE 1.5 G/1.58G
20-40 POWDER, FOR SOLUTION ORAL
Status: DISCONTINUED | OUTPATIENT
Start: 2019-01-01 | End: 2019-01-01 | Stop reason: HOSPADM

## 2019-01-01 RX ORDER — POTASSIUM CHLORIDE 7.45 MG/ML
10 INJECTION INTRAVENOUS
Status: DISCONTINUED | OUTPATIENT
Start: 2019-01-01 | End: 2019-01-01

## 2019-01-01 RX ORDER — METRONIDAZOLE 250 MG/1
250 TABLET ORAL EVERY 8 HOURS SCHEDULED
Status: DISCONTINUED | OUTPATIENT
Start: 2019-01-01 | End: 2019-01-01

## 2019-01-01 RX ORDER — POTASSIUM CHLORIDE 29.8 MG/ML
20 INJECTION INTRAVENOUS
Status: DISCONTINUED | OUTPATIENT
Start: 2019-01-01 | End: 2019-01-01

## 2019-01-01 RX ORDER — HEPARIN SODIUM,PORCINE 10 UNIT/ML
5-10 VIAL (ML) INTRAVENOUS EVERY 24 HOURS
Status: DISCONTINUED | OUTPATIENT
Start: 2019-01-01 | End: 2019-01-01

## 2019-01-01 RX ORDER — POTASSIUM CHLORIDE 1.5 G/1.58G
20-40 POWDER, FOR SOLUTION ORAL
Status: DISCONTINUED | OUTPATIENT
Start: 2019-01-01 | End: 2019-01-01

## 2019-01-01 RX ORDER — POTASSIUM CHLORIDE 7.45 MG/ML
10 INJECTION INTRAVENOUS
Status: DISCONTINUED | OUTPATIENT
Start: 2019-01-01 | End: 2019-01-01 | Stop reason: HOSPADM

## 2019-01-01 RX ORDER — INSULIN GLARGINE 100 [IU]/ML
15 INJECTION, SOLUTION SUBCUTANEOUS 2 TIMES DAILY
Qty: 45 ML | Refills: 5 | DISCHARGE
Start: 2019-01-01 | End: 2019-01-01

## 2019-01-01 RX ORDER — POTASSIUM CHLORIDE 29.8 MG/ML
20 INJECTION INTRAVENOUS
Status: DISCONTINUED | OUTPATIENT
Start: 2019-01-01 | End: 2019-01-01 | Stop reason: HOSPADM

## 2019-01-01 RX ORDER — NYSTATIN 100000 U/G
CREAM TOPICAL 2 TIMES DAILY
DISCHARGE
Start: 2019-01-01

## 2019-01-01 RX ORDER — NALOXONE HYDROCHLORIDE 0.4 MG/ML
.1-.4 INJECTION, SOLUTION INTRAMUSCULAR; INTRAVENOUS; SUBCUTANEOUS
Status: DISCONTINUED | OUTPATIENT
Start: 2019-01-01 | End: 2019-01-01 | Stop reason: HOSPADM

## 2019-01-01 RX ORDER — SODIUM CHLORIDE, SODIUM LACTATE, POTASSIUM CHLORIDE, CALCIUM CHLORIDE 600; 310; 30; 20 MG/100ML; MG/100ML; MG/100ML; MG/100ML
INJECTION, SOLUTION INTRAVENOUS CONTINUOUS
Status: DISCONTINUED | OUTPATIENT
Start: 2019-01-01 | End: 2019-01-01 | Stop reason: CLARIF

## 2019-01-01 RX ORDER — POTASSIUM CHLORIDE 1500 MG/1
20-40 TABLET, EXTENDED RELEASE ORAL
Status: DISCONTINUED | OUTPATIENT
Start: 2019-01-01 | End: 2019-01-01 | Stop reason: HOSPADM

## 2019-01-01 RX ORDER — ONDANSETRON 4 MG/1
4 TABLET, ORALLY DISINTEGRATING ORAL EVERY 6 HOURS PRN
Status: DISCONTINUED | OUTPATIENT
Start: 2019-01-01 | End: 2019-01-01 | Stop reason: HOSPADM

## 2019-01-01 RX ORDER — MEROPENEM 1 G/1
1 INJECTION, POWDER, FOR SOLUTION INTRAVENOUS EVERY 12 HOURS
Status: DISCONTINUED | OUTPATIENT
Start: 2019-01-01 | End: 2019-01-01

## 2019-01-01 RX ORDER — DOCUSATE SODIUM AND SENNOSIDES 8.6; 5 MG/1; MG/1
TABLET, FILM COATED ORAL
Qty: 90 TABLET | Refills: 1 | Status: ON HOLD | OUTPATIENT
Start: 2019-01-01 | End: 2019-01-01

## 2019-01-01 RX ORDER — HYDROCORTISONE 2.5 %
CREAM (GRAM) TOPICAL 2 TIMES DAILY PRN
Status: DISCONTINUED | OUTPATIENT
Start: 2019-01-01 | End: 2019-01-01 | Stop reason: HOSPADM

## 2019-01-01 RX ORDER — ONDANSETRON 2 MG/ML
4 INJECTION INTRAMUSCULAR; INTRAVENOUS EVERY 6 HOURS PRN
Status: DISCONTINUED | OUTPATIENT
Start: 2019-01-01 | End: 2019-01-01 | Stop reason: HOSPADM

## 2019-01-01 RX ORDER — PANTOPRAZOLE SODIUM 40 MG/1
40 TABLET, DELAYED RELEASE ORAL
Status: DISCONTINUED | OUTPATIENT
Start: 2019-01-01 | End: 2019-01-01 | Stop reason: HOSPADM

## 2019-01-01 RX ORDER — OXYCODONE HYDROCHLORIDE 5 MG/1
5 TABLET ORAL
Status: DISCONTINUED | OUTPATIENT
Start: 2019-01-01 | End: 2019-01-01 | Stop reason: HOSPADM

## 2019-01-01 RX ORDER — PROCHLORPERAZINE MALEATE 5 MG
5 TABLET ORAL EVERY 6 HOURS PRN
Status: DISCONTINUED | OUTPATIENT
Start: 2019-01-01 | End: 2019-01-01 | Stop reason: HOSPADM

## 2019-01-01 RX ORDER — SIMVASTATIN 40 MG
40 TABLET ORAL AT BEDTIME
Qty: 90 TABLET | Refills: 2 | Status: SHIPPED | OUTPATIENT
Start: 2019-01-01

## 2019-01-01 RX ORDER — DEXTROSE MONOHYDRATE, SODIUM CHLORIDE, AND POTASSIUM CHLORIDE 50; 1.49; 9 G/1000ML; G/1000ML; G/1000ML
INJECTION, SOLUTION INTRAVENOUS CONTINUOUS
Status: DISCONTINUED | OUTPATIENT
Start: 2019-01-01 | End: 2019-01-01

## 2019-01-01 RX ORDER — INSULIN GLARGINE 100 [IU]/ML
15 INJECTION, SOLUTION SUBCUTANEOUS EVERY MORNING
Qty: 45 ML | Refills: 5 | DISCHARGE
Start: 2019-01-01 | End: 2019-01-01

## 2019-01-01 RX ORDER — BACITRACIN ZINC 500 [USP'U]/G
OINTMENT TOPICAL ONCE
Status: DISCONTINUED | OUTPATIENT
Start: 2019-01-01 | End: 2019-01-01

## 2019-01-01 RX ORDER — HYDROCORTISONE 2.5 %
CREAM (GRAM) TOPICAL 2 TIMES DAILY PRN
DISCHARGE
Start: 2019-01-01

## 2019-01-01 RX ORDER — SODIUM CHLORIDE 450 MG/100ML
INJECTION, SOLUTION INTRAVENOUS CONTINUOUS
Status: DISCONTINUED | OUTPATIENT
Start: 2019-01-01 | End: 2019-01-01

## 2019-01-01 RX ORDER — ROPINIROLE 0.25 MG/1
0.25 TABLET, FILM COATED ORAL
Qty: 90 TABLET | Refills: 1 | DISCHARGE
Start: 2019-01-01

## 2019-01-01 RX ORDER — NYSTATIN 100000 U/G
CREAM TOPICAL 2 TIMES DAILY
Status: DISCONTINUED | OUTPATIENT
Start: 2019-01-01 | End: 2019-01-01 | Stop reason: HOSPADM

## 2019-01-01 RX ORDER — DEXTROSE MONOHYDRATE 25 G/50ML
25-50 INJECTION, SOLUTION INTRAVENOUS
Status: DISCONTINUED | OUTPATIENT
Start: 2019-01-01 | End: 2019-01-01 | Stop reason: HOSPADM

## 2019-01-01 RX ORDER — ACETAMINOPHEN 500 MG
500 TABLET ORAL ONCE
Status: DISCONTINUED | OUTPATIENT
Start: 2019-01-01 | End: 2019-01-01 | Stop reason: CLARIF

## 2019-01-01 RX ORDER — LISINOPRIL 20 MG/1
TABLET ORAL
Qty: 30 TABLET | Refills: 0 | Status: ON HOLD | OUTPATIENT
Start: 2019-01-01 | End: 2019-01-01

## 2019-01-01 RX ORDER — ACETAMINOPHEN 325 MG/1
650 TABLET ORAL EVERY 4 HOURS PRN
Status: DISCONTINUED | OUTPATIENT
Start: 2019-01-01 | End: 2019-01-01 | Stop reason: HOSPADM

## 2019-01-01 RX ORDER — MAGNESIUM SULFATE HEPTAHYDRATE 40 MG/ML
4 INJECTION, SOLUTION INTRAVENOUS EVERY 4 HOURS PRN
Status: DISCONTINUED | OUTPATIENT
Start: 2019-01-01 | End: 2019-01-01 | Stop reason: HOSPADM

## 2019-01-01 RX ORDER — HYDROMORPHONE HYDROCHLORIDE 1 MG/ML
0.2 INJECTION, SOLUTION INTRAMUSCULAR; INTRAVENOUS; SUBCUTANEOUS
Status: DISCONTINUED | OUTPATIENT
Start: 2019-01-01 | End: 2019-01-01

## 2019-01-01 RX ADMIN — ENOXAPARIN SODIUM 40 MG: 40 INJECTION SUBCUTANEOUS at 09:37

## 2019-01-01 RX ADMIN — ONDANSETRON 4 MG: 2 INJECTION INTRAMUSCULAR; INTRAVENOUS at 16:52

## 2019-01-01 RX ADMIN — SODIUM CHLORIDE, PRESERVATIVE FREE 5 ML: 5 INJECTION INTRAVENOUS at 05:12

## 2019-01-01 RX ADMIN — PANTOPRAZOLE SODIUM 40 MG: 40 INJECTION, POWDER, FOR SOLUTION INTRAVENOUS at 08:43

## 2019-01-01 RX ADMIN — SODIUM CHLORIDE, PRESERVATIVE FREE 5 ML: 5 INJECTION INTRAVENOUS at 19:00

## 2019-01-01 RX ADMIN — POTASSIUM CHLORIDE, DEXTROSE MONOHYDRATE AND SODIUM CHLORIDE: 150; 5; 900 INJECTION, SOLUTION INTRAVENOUS at 01:56

## 2019-01-01 RX ADMIN — OXYCODONE HYDROCHLORIDE 5 MG: 5 TABLET ORAL at 17:59

## 2019-01-01 RX ADMIN — SODIUM CHLORIDE 1000 ML: 9 INJECTION, SOLUTION INTRAVENOUS at 15:11

## 2019-01-01 RX ADMIN — METRONIDAZOLE 250 MG: 250 TABLET ORAL at 14:29

## 2019-01-01 RX ADMIN — HYDROMORPHONE HYDROCHLORIDE 0.2 MG: 1 INJECTION, SOLUTION INTRAMUSCULAR; INTRAVENOUS; SUBCUTANEOUS at 08:03

## 2019-01-01 RX ADMIN — POTASSIUM CHLORIDE 20 MEQ: 1500 TABLET, EXTENDED RELEASE ORAL at 08:07

## 2019-01-01 RX ADMIN — AMOXICILLIN AND CLAVULANATE POTASSIUM 1 TABLET: 875; 125 TABLET, FILM COATED ORAL at 08:05

## 2019-01-01 RX ADMIN — Medication 10 MEQ: at 13:40

## 2019-01-01 RX ADMIN — PROCHLORPERAZINE EDISYLATE 5 MG: 5 INJECTION INTRAMUSCULAR; INTRAVENOUS at 14:25

## 2019-01-01 RX ADMIN — SODIUM CHLORIDE 1 UNITS/HR: 9 INJECTION, SOLUTION INTRAVENOUS at 19:30

## 2019-01-01 RX ADMIN — METRONIDAZOLE 250 MG: 250 TABLET ORAL at 21:34

## 2019-01-01 RX ADMIN — AMOXICILLIN AND CLAVULANATE POTASSIUM 1 TABLET: 875; 125 TABLET, FILM COATED ORAL at 21:23

## 2019-01-01 RX ADMIN — ACETAMINOPHEN 650 MG: 325 TABLET ORAL at 23:38

## 2019-01-01 RX ADMIN — PANTOPRAZOLE SODIUM 40 MG: 40 INJECTION, POWDER, FOR SOLUTION INTRAVENOUS at 08:04

## 2019-01-01 RX ADMIN — POTASSIUM CHLORIDE 20 MEQ: 29.8 INJECTION, SOLUTION INTRAVENOUS at 08:22

## 2019-01-01 RX ADMIN — INSULIN GLARGINE 10 UNITS: 100 INJECTION, SOLUTION SUBCUTANEOUS at 21:36

## 2019-01-01 RX ADMIN — POTASSIUM CHLORIDE 20 MEQ: 29.8 INJECTION, SOLUTION INTRAVENOUS at 09:35

## 2019-01-01 RX ADMIN — SODIUM CHLORIDE: 4.5 INJECTION, SOLUTION INTRAVENOUS at 07:01

## 2019-01-01 RX ADMIN — POTASSIUM CHLORIDE, DEXTROSE MONOHYDRATE AND SODIUM CHLORIDE: 150; 5; 900 INJECTION, SOLUTION INTRAVENOUS at 22:22

## 2019-01-01 RX ADMIN — TAZOBACTAM SODIUM AND PIPERACILLIN SODIUM 4.5 G: 500; 4 INJECTION, SOLUTION INTRAVENOUS at 19:09

## 2019-01-01 RX ADMIN — ONDANSETRON 4 MG: 2 INJECTION INTRAMUSCULAR; INTRAVENOUS at 23:56

## 2019-01-01 RX ADMIN — INSULIN GLARGINE 15 UNITS: 100 INJECTION, SOLUTION SUBCUTANEOUS at 22:03

## 2019-01-01 RX ADMIN — OXYCODONE HYDROCHLORIDE 5 MG: 5 TABLET ORAL at 21:29

## 2019-01-01 RX ADMIN — SODIUM CHLORIDE: 4.5 INJECTION, SOLUTION INTRAVENOUS at 20:45

## 2019-01-01 RX ADMIN — PANTOPRAZOLE SODIUM 40 MG: 40 INJECTION, POWDER, FOR SOLUTION INTRAVENOUS at 09:37

## 2019-01-01 RX ADMIN — METRONIDAZOLE 250 MG: 250 TABLET ORAL at 05:12

## 2019-01-01 RX ADMIN — MEROPENEM 1 G: 1 INJECTION, POWDER, FOR SOLUTION INTRAVENOUS at 10:09

## 2019-01-01 RX ADMIN — ENOXAPARIN SODIUM 40 MG: 40 INJECTION SUBCUTANEOUS at 13:18

## 2019-01-01 RX ADMIN — SODIUM BICARBONATE: 84 INJECTION, SOLUTION INTRAVENOUS at 14:28

## 2019-01-01 RX ADMIN — INSULIN GLARGINE 15 UNITS: 100 INJECTION, SOLUTION SUBCUTANEOUS at 09:23

## 2019-01-01 RX ADMIN — Medication 10 MEQ: at 11:27

## 2019-01-01 RX ADMIN — POTASSIUM CHLORIDE 20 MEQ: 1500 TABLET, EXTENDED RELEASE ORAL at 09:22

## 2019-01-01 RX ADMIN — SODIUM CHLORIDE, PRESERVATIVE FREE 5 ML: 5 INJECTION INTRAVENOUS at 17:31

## 2019-01-01 RX ADMIN — OXYCODONE HYDROCHLORIDE 5 MG: 5 TABLET ORAL at 14:02

## 2019-01-01 RX ADMIN — INSULIN GLARGINE 10 UNITS: 100 INJECTION, SOLUTION SUBCUTANEOUS at 22:20

## 2019-01-01 RX ADMIN — METRONIDAZOLE 250 MG: 250 TABLET ORAL at 14:02

## 2019-01-01 RX ADMIN — MAGNESIUM SULFATE IN WATER 4 G: 40 INJECTION, SOLUTION INTRAVENOUS at 06:41

## 2019-01-01 RX ADMIN — SODIUM CHLORIDE, PRESERVATIVE FREE 5 ML: 5 INJECTION INTRAVENOUS at 17:43

## 2019-01-01 RX ADMIN — POTASSIUM CHLORIDE 20 MEQ: 29.8 INJECTION, SOLUTION INTRAVENOUS at 08:05

## 2019-01-01 RX ADMIN — SODIUM CHLORIDE, PRESERVATIVE FREE 10 ML: 5 INJECTION INTRAVENOUS at 17:59

## 2019-01-01 RX ADMIN — SODIUM CHLORIDE, PRESERVATIVE FREE 5 ML: 5 INJECTION INTRAVENOUS at 18:14

## 2019-01-01 RX ADMIN — SODIUM BICARBONATE: 84 INJECTION, SOLUTION INTRAVENOUS at 06:40

## 2019-01-01 RX ADMIN — INSULIN GLARGINE 15 UNITS: 100 INJECTION, SOLUTION SUBCUTANEOUS at 09:50

## 2019-01-01 RX ADMIN — SODIUM CHLORIDE 1 UNITS/HR: 9 INJECTION, SOLUTION INTRAVENOUS at 06:59

## 2019-01-01 RX ADMIN — Medication 10 MEQ: at 12:40

## 2019-01-01 RX ADMIN — POTASSIUM CHLORIDE 20 MEQ: 29.8 INJECTION, SOLUTION INTRAVENOUS at 07:46

## 2019-01-01 RX ADMIN — SODIUM BICARBONATE: 84 INJECTION, SOLUTION INTRAVENOUS at 04:08

## 2019-01-01 RX ADMIN — SODIUM CHLORIDE, POTASSIUM CHLORIDE, SODIUM LACTATE AND CALCIUM CHLORIDE 1000 ML: 600; 310; 30; 20 INJECTION, SOLUTION INTRAVENOUS at 18:13

## 2019-01-01 RX ADMIN — SODIUM CHLORIDE 60 ML: 9 INJECTION, SOLUTION INTRAVENOUS at 11:43

## 2019-01-01 RX ADMIN — SODIUM BICARBONATE: 84 INJECTION, SOLUTION INTRAVENOUS at 22:00

## 2019-01-01 RX ADMIN — SODIUM CHLORIDE: 4.5 INJECTION, SOLUTION INTRAVENOUS at 09:45

## 2019-01-01 RX ADMIN — INSULIN GLARGINE 15 UNITS: 100 INJECTION, SOLUTION SUBCUTANEOUS at 09:41

## 2019-01-01 RX ADMIN — POTASSIUM CHLORIDE 20 MEQ: 29.8 INJECTION, SOLUTION INTRAVENOUS at 09:09

## 2019-01-01 RX ADMIN — Medication 10 MEQ: at 11:58

## 2019-01-01 RX ADMIN — PANTOPRAZOLE SODIUM 40 MG: 40 INJECTION, POWDER, FOR SOLUTION INTRAVENOUS at 12:03

## 2019-01-01 RX ADMIN — TAZOBACTAM SODIUM AND PIPERACILLIN SODIUM 3.38 G: 375; 3 INJECTION, SOLUTION INTRAVENOUS at 19:46

## 2019-01-01 RX ADMIN — PANTOPRAZOLE SODIUM 40 MG: 40 INJECTION, POWDER, FOR SOLUTION INTRAVENOUS at 09:19

## 2019-01-01 RX ADMIN — ONDANSETRON 4 MG: 2 INJECTION INTRAMUSCULAR; INTRAVENOUS at 06:44

## 2019-01-01 RX ADMIN — TAZOBACTAM SODIUM AND PIPERACILLIN SODIUM 3.38 G: 375; 3 INJECTION, SOLUTION INTRAVENOUS at 06:40

## 2019-01-01 RX ADMIN — ENOXAPARIN SODIUM 40 MG: 40 INJECTION SUBCUTANEOUS at 20:08

## 2019-01-01 RX ADMIN — METRONIDAZOLE 250 MG: 250 TABLET ORAL at 17:43

## 2019-01-01 RX ADMIN — SODIUM BICARBONATE: 84 INJECTION, SOLUTION INTRAVENOUS at 20:08

## 2019-01-01 RX ADMIN — INSULIN GLARGINE 15 UNITS: 100 INJECTION, SOLUTION SUBCUTANEOUS at 09:18

## 2019-01-01 RX ADMIN — METRONIDAZOLE 250 MG: 250 TABLET ORAL at 21:00

## 2019-01-01 RX ADMIN — VANCOMYCIN HYDROCHLORIDE 2000 MG: 1 INJECTION, POWDER, LYOPHILIZED, FOR SOLUTION INTRAVENOUS at 16:42

## 2019-01-01 RX ADMIN — INSULIN GLARGINE 10 UNITS: 100 INJECTION, SOLUTION SUBCUTANEOUS at 21:29

## 2019-01-01 RX ADMIN — POTASSIUM CHLORIDE, DEXTROSE MONOHYDRATE AND SODIUM CHLORIDE: 150; 5; 900 INJECTION, SOLUTION INTRAVENOUS at 08:04

## 2019-01-01 RX ADMIN — HUMAN ALBUMIN MICROSPHERES AND PERFLUTREN 3 ML: 10; .22 INJECTION, SOLUTION INTRAVENOUS at 13:55

## 2019-01-01 RX ADMIN — TAZOBACTAM SODIUM AND PIPERACILLIN SODIUM 3.38 G: 375; 3 INJECTION, SOLUTION INTRAVENOUS at 13:16

## 2019-01-01 RX ADMIN — AMOXICILLIN AND CLAVULANATE POTASSIUM 1 TABLET: 875; 125 TABLET, FILM COATED ORAL at 08:04

## 2019-01-01 RX ADMIN — SODIUM CHLORIDE, PRESERVATIVE FREE 10 ML: 5 INJECTION INTRAVENOUS at 18:42

## 2019-01-01 RX ADMIN — ACETAMINOPHEN 650 MG: 325 TABLET ORAL at 14:53

## 2019-01-01 RX ADMIN — SODIUM BICARBONATE: 84 INJECTION, SOLUTION INTRAVENOUS at 05:27

## 2019-01-01 RX ADMIN — AMOXICILLIN AND CLAVULANATE POTASSIUM 1 TABLET: 875; 125 TABLET, FILM COATED ORAL at 20:53

## 2019-01-01 RX ADMIN — ENOXAPARIN SODIUM 40 MG: 40 INJECTION SUBCUTANEOUS at 09:07

## 2019-01-01 RX ADMIN — PANTOPRAZOLE SODIUM 40 MG: 40 INJECTION, POWDER, FOR SOLUTION INTRAVENOUS at 08:59

## 2019-01-01 RX ADMIN — TAZOBACTAM SODIUM AND PIPERACILLIN SODIUM 4.5 G: 500; 4 INJECTION, SOLUTION INTRAVENOUS at 06:29

## 2019-01-01 RX ADMIN — INSULIN GLARGINE 15 UNITS: 100 INJECTION, SOLUTION SUBCUTANEOUS at 09:06

## 2019-01-01 RX ADMIN — POTASSIUM CHLORIDE 20 MEQ: 29.8 INJECTION, SOLUTION INTRAVENOUS at 01:21

## 2019-01-01 RX ADMIN — AMOXICILLIN AND CLAVULANATE POTASSIUM 1 TABLET: 875; 125 TABLET, FILM COATED ORAL at 09:37

## 2019-01-01 RX ADMIN — TAZOBACTAM SODIUM AND PIPERACILLIN SODIUM 4.5 G: 500; 4 INJECTION, SOLUTION INTRAVENOUS at 22:40

## 2019-01-01 RX ADMIN — NYSTATIN: 100000 CREAM TOPICAL at 09:25

## 2019-01-01 RX ADMIN — TAZOBACTAM SODIUM AND PIPERACILLIN SODIUM 4.5 G: 500; 4 INJECTION, SOLUTION INTRAVENOUS at 15:34

## 2019-01-01 RX ADMIN — POTASSIUM CHLORIDE 20 MEQ: 29.8 INJECTION, SOLUTION INTRAVENOUS at 06:59

## 2019-01-01 RX ADMIN — SODIUM BICARBONATE: 84 INJECTION, SOLUTION INTRAVENOUS at 10:57

## 2019-01-01 RX ADMIN — SODIUM CHLORIDE, PRESERVATIVE FREE 5 ML: 5 INJECTION INTRAVENOUS at 06:07

## 2019-01-01 RX ADMIN — INSULIN GLARGINE 15 UNITS: 100 INJECTION, SOLUTION SUBCUTANEOUS at 08:04

## 2019-01-01 RX ADMIN — METRONIDAZOLE 250 MG: 250 TABLET ORAL at 21:23

## 2019-01-01 RX ADMIN — TAZOBACTAM SODIUM AND PIPERACILLIN SODIUM 3.38 G: 375; 3 INJECTION, SOLUTION INTRAVENOUS at 06:00

## 2019-01-01 RX ADMIN — TAZOBACTAM SODIUM AND PIPERACILLIN SODIUM 3.38 G: 375; 3 INJECTION, SOLUTION INTRAVENOUS at 13:11

## 2019-01-01 RX ADMIN — SODIUM CHLORIDE 2 UNITS/HR: 9 INJECTION, SOLUTION INTRAVENOUS at 22:33

## 2019-01-01 RX ADMIN — POTASSIUM CHLORIDE 20 MEQ: 29.8 INJECTION, SOLUTION INTRAVENOUS at 02:17

## 2019-01-01 RX ADMIN — PANTOPRAZOLE SODIUM 40 MG: 40 TABLET, DELAYED RELEASE ORAL at 08:03

## 2019-01-01 RX ADMIN — AMOXICILLIN AND CLAVULANATE POTASSIUM 1 TABLET: 875; 125 TABLET, FILM COATED ORAL at 09:07

## 2019-01-01 RX ADMIN — POTASSIUM CHLORIDE, DEXTROSE MONOHYDRATE AND SODIUM CHLORIDE: 150; 5; 900 INJECTION, SOLUTION INTRAVENOUS at 10:56

## 2019-01-01 RX ADMIN — PANTOPRAZOLE SODIUM 40 MG: 40 INJECTION, POWDER, FOR SOLUTION INTRAVENOUS at 09:36

## 2019-01-01 RX ADMIN — SODIUM CHLORIDE 100 ML: 9 INJECTION, SOLUTION INTRAVENOUS at 14:39

## 2019-01-01 RX ADMIN — TAZOBACTAM SODIUM AND PIPERACILLIN SODIUM 4.5 G: 500; 4 INJECTION, SOLUTION INTRAVENOUS at 03:13

## 2019-01-01 RX ADMIN — TAZOBACTAM SODIUM AND PIPERACILLIN SODIUM 3.38 G: 375; 3 INJECTION, SOLUTION INTRAVENOUS at 12:09

## 2019-01-01 RX ADMIN — DEXTROSE MONOHYDRATE 25 ML: 500 INJECTION PARENTERAL at 08:03

## 2019-01-01 RX ADMIN — TAZOBACTAM SODIUM AND PIPERACILLIN SODIUM 4.5 G: 500; 4 INJECTION, SOLUTION INTRAVENOUS at 09:57

## 2019-01-01 RX ADMIN — TAZOBACTAM SODIUM AND PIPERACILLIN SODIUM 3.38 G: 375; 3 INJECTION, SOLUTION INTRAVENOUS at 18:32

## 2019-01-01 RX ADMIN — SODIUM BICARBONATE: 84 INJECTION, SOLUTION INTRAVENOUS at 14:46

## 2019-01-01 RX ADMIN — SODIUM CHLORIDE 1000 ML: 9 INJECTION, SOLUTION INTRAVENOUS at 03:19

## 2019-01-01 RX ADMIN — Medication 10 MEQ: at 14:39

## 2019-01-01 RX ADMIN — NYSTATIN: 100000 CREAM TOPICAL at 21:29

## 2019-01-01 RX ADMIN — POTASSIUM CHLORIDE 20 MEQ: 29.8 INJECTION, SOLUTION INTRAVENOUS at 18:34

## 2019-01-01 RX ADMIN — SODIUM BICARBONATE: 84 INJECTION, SOLUTION INTRAVENOUS at 00:10

## 2019-01-01 RX ADMIN — IOPAMIDOL 100 ML: 755 INJECTION, SOLUTION INTRAVENOUS at 11:43

## 2019-01-01 RX ADMIN — NYSTATIN: 100000 CREAM TOPICAL at 09:24

## 2019-01-01 RX ADMIN — INSULIN GLARGINE 15 UNITS: 100 INJECTION, SOLUTION SUBCUTANEOUS at 10:00

## 2019-01-01 RX ADMIN — TAZOBACTAM SODIUM AND PIPERACILLIN SODIUM 3.38 G: 375; 3 INJECTION, SOLUTION INTRAVENOUS at 19:51

## 2019-01-01 RX ADMIN — INSULIN GLARGINE 15 UNITS: 100 INJECTION, SOLUTION SUBCUTANEOUS at 21:00

## 2019-01-01 RX ADMIN — TAZOBACTAM SODIUM AND PIPERACILLIN SODIUM 3.38 G: 375; 3 INJECTION, SOLUTION INTRAVENOUS at 00:41

## 2019-01-01 RX ADMIN — PANTOPRAZOLE SODIUM 40 MG: 40 INJECTION, POWDER, FOR SOLUTION INTRAVENOUS at 09:07

## 2019-01-01 RX ADMIN — ONDANSETRON 4 MG: 2 INJECTION INTRAMUSCULAR; INTRAVENOUS at 16:58

## 2019-01-01 RX ADMIN — NYSTATIN: 100000 CREAM TOPICAL at 22:20

## 2019-01-01 RX ADMIN — ENOXAPARIN SODIUM 40 MG: 40 INJECTION SUBCUTANEOUS at 10:42

## 2019-01-01 RX ADMIN — METRONIDAZOLE 250 MG: 250 TABLET ORAL at 05:41

## 2019-01-01 RX ADMIN — AMOXICILLIN AND CLAVULANATE POTASSIUM 1 TABLET: 875; 125 TABLET, FILM COATED ORAL at 21:34

## 2019-01-01 RX ADMIN — TAZOBACTAM SODIUM AND PIPERACILLIN SODIUM 3.38 G: 375; 3 INJECTION, SOLUTION INTRAVENOUS at 00:33

## 2019-01-01 RX ADMIN — VANCOMYCIN HYDROCHLORIDE 2000 MG: 1 INJECTION, POWDER, LYOPHILIZED, FOR SOLUTION INTRAVENOUS at 15:36

## 2019-01-01 RX ADMIN — INSULIN ASPART 2 UNITS: 100 INJECTION, SOLUTION INTRAVENOUS; SUBCUTANEOUS at 05:37

## 2019-01-01 RX ADMIN — ONDANSETRON 4 MG: 2 INJECTION INTRAMUSCULAR; INTRAVENOUS at 02:12

## 2019-01-01 RX ADMIN — VANCOMYCIN HYDROCHLORIDE 2000 MG: 1 INJECTION, POWDER, LYOPHILIZED, FOR SOLUTION INTRAVENOUS at 15:52

## 2019-01-01 RX ADMIN — TAZOBACTAM SODIUM AND PIPERACILLIN SODIUM 4.5 G: 500; 4 INJECTION, SOLUTION INTRAVENOUS at 01:09

## 2019-01-01 RX ADMIN — INSULIN GLARGINE 10 UNITS: 100 INJECTION, SOLUTION SUBCUTANEOUS at 21:16

## 2019-01-01 RX ADMIN — Medication 10 MEQ: at 10:42

## 2019-01-01 RX ADMIN — VANCOMYCIN HYDROCHLORIDE 2000 MG: 1 INJECTION, POWDER, LYOPHILIZED, FOR SOLUTION INTRAVENOUS at 04:09

## 2019-01-01 RX ADMIN — POTASSIUM CHLORIDE 20 MEQ: 29.8 INJECTION, SOLUTION INTRAVENOUS at 06:04

## 2019-01-01 RX ADMIN — HYDROCORTISONE: 25 CREAM TOPICAL at 17:03

## 2019-01-01 RX ADMIN — OXYCODONE HYDROCHLORIDE 5 MG: 5 TABLET ORAL at 04:27

## 2019-01-01 RX ADMIN — SODIUM CHLORIDE 1 UNITS/HR: 9 INJECTION, SOLUTION INTRAVENOUS at 18:39

## 2019-01-01 RX ADMIN — SODIUM CHLORIDE: 9 INJECTION, SOLUTION INTRAVENOUS at 18:44

## 2019-01-01 RX ADMIN — TAZOBACTAM SODIUM AND PIPERACILLIN SODIUM 3.38 G: 375; 3 INJECTION, SOLUTION INTRAVENOUS at 00:55

## 2019-01-01 RX ADMIN — ENOXAPARIN SODIUM 40 MG: 40 INJECTION SUBCUTANEOUS at 18:47

## 2019-01-01 RX ADMIN — ACETAMINOPHEN 650 MG: 325 TABLET ORAL at 23:33

## 2019-01-01 RX ADMIN — INSULIN GLARGINE 10 UNITS: 100 INJECTION, SOLUTION SUBCUTANEOUS at 21:23

## 2019-01-01 RX ADMIN — IOPAMIDOL 70 ML: 755 INJECTION, SOLUTION INTRAVENOUS at 14:39

## 2019-01-01 RX ADMIN — POTASSIUM CHLORIDE 20 MEQ: 29.8 INJECTION, SOLUTION INTRAVENOUS at 08:38

## 2019-01-01 RX ADMIN — POTASSIUM CHLORIDE 20 MEQ: 1500 TABLET, EXTENDED RELEASE ORAL at 22:04

## 2019-01-01 RX ADMIN — AMOXICILLIN AND CLAVULANATE POTASSIUM 1 TABLET: 875; 125 TABLET, FILM COATED ORAL at 20:58

## 2019-01-01 RX ADMIN — SODIUM BICARBONATE: 84 INJECTION, SOLUTION INTRAVENOUS at 06:29

## 2019-01-01 RX ADMIN — ENOXAPARIN SODIUM 40 MG: 40 INJECTION SUBCUTANEOUS at 09:50

## 2019-01-01 RX ADMIN — TAZOBACTAM SODIUM AND PIPERACILLIN SODIUM 3.38 G: 375; 3 INJECTION, SOLUTION INTRAVENOUS at 13:39

## 2019-01-01 RX ADMIN — NOREPINEPHRINE BITARTRATE 0.03 MCG/KG/MIN: 1 INJECTION INTRAVENOUS at 05:53

## 2019-01-01 RX ADMIN — SODIUM BICARBONATE: 84 INJECTION, SOLUTION INTRAVENOUS at 13:08

## 2019-01-01 RX ADMIN — SODIUM CHLORIDE, PRESERVATIVE FREE 5 ML: 5 INJECTION INTRAVENOUS at 21:40

## 2019-01-01 RX ADMIN — POTASSIUM PHOSPHATE, MONOBASIC AND POTASSIUM PHOSPHATE, DIBASIC 15 MMOL: 224; 236 INJECTION, SOLUTION INTRAVENOUS at 16:08

## 2019-01-01 RX ADMIN — TAZOBACTAM SODIUM AND PIPERACILLIN SODIUM 3.38 G: 375; 3 INJECTION, SOLUTION INTRAVENOUS at 06:21

## 2019-01-01 RX ADMIN — INSULIN GLARGINE 15 UNITS: 100 INJECTION, SOLUTION SUBCUTANEOUS at 22:02

## 2019-01-01 RX ADMIN — SODIUM CHLORIDE, PRESERVATIVE FREE 5 ML: 5 INJECTION INTRAVENOUS at 22:44

## 2019-01-01 RX ADMIN — ACETAMINOPHEN 650 MG: 325 TABLET ORAL at 09:07

## 2019-01-01 RX ADMIN — NYSTATIN: 100000 CREAM TOPICAL at 18:16

## 2019-01-01 RX ADMIN — METRONIDAZOLE 250 MG: 250 TABLET ORAL at 06:07

## 2019-01-01 RX ADMIN — ACETAMINOPHEN 650 MG: 325 TABLET ORAL at 00:38

## 2019-01-01 ASSESSMENT — MIFFLIN-ST. JEOR
SCORE: 2042.38
SCORE: 1956.38
SCORE: 1974.11
SCORE: 1883.39
SCORE: 2100.38
SCORE: 2086.38
SCORE: 2058.03
SCORE: 1953.38
SCORE: 2085.38
SCORE: 2109.38
SCORE: 2112.01
SCORE: 2089.38
SCORE: 2009.38
SCORE: 2059.38
SCORE: 1955.38

## 2019-01-01 ASSESSMENT — ACTIVITIES OF DAILY LIVING (ADL)
ADLS_ACUITY_SCORE: 24
ADLS_ACUITY_SCORE: 34
COGNITION: 0 - NO COGNITION ISSUES REPORTED
ADLS_ACUITY_SCORE: 23
ADLS_ACUITY_SCORE: 32
ADLS_ACUITY_SCORE: 34
ADLS_ACUITY_SCORE: 28
ADLS_ACUITY_SCORE: 23
ADLS_ACUITY_SCORE: 24
ADLS_ACUITY_SCORE: 23
ADLS_ACUITY_SCORE: 23
FALL_HISTORY_WITHIN_LAST_SIX_MONTHS: NO
ADLS_ACUITY_SCORE: 36
TRANSFERRING: 3-->ASSISTIVE EQUIPMENT AND PERSON
ADLS_ACUITY_SCORE: 28
ADLS_ACUITY_SCORE: 30
ADLS_ACUITY_SCORE: 34
ADLS_ACUITY_SCORE: 34
ADLS_ACUITY_SCORE: 28
ADLS_ACUITY_SCORE: 32
ADLS_ACUITY_SCORE: 34
ADLS_ACUITY_SCORE: 36
ADLS_ACUITY_SCORE: 28
ADLS_ACUITY_SCORE: 24
ADLS_ACUITY_SCORE: 28
ADLS_ACUITY_SCORE: 32
ADLS_ACUITY_SCORE: 34
BATHING: 2-->ASSISTIVE PERSON
ADLS_ACUITY_SCORE: 32
DRESS: 2-->ASSISTIVE PERSON
ADLS_ACUITY_SCORE: 28
ADLS_ACUITY_SCORE: 36
ADLS_ACUITY_SCORE: 34
ADLS_ACUITY_SCORE: 32
ADLS_ACUITY_SCORE: 28
ADLS_ACUITY_SCORE: 28
ADLS_ACUITY_SCORE: 36
ADLS_ACUITY_SCORE: 28
ADLS_ACUITY_SCORE: 28
ADLS_ACUITY_SCORE: 32
ADLS_ACUITY_SCORE: 34
ADLS_ACUITY_SCORE: 28
ADLS_ACUITY_SCORE: 34
ADLS_ACUITY_SCORE: 34
ADLS_ACUITY_SCORE: 36
ADLS_ACUITY_SCORE: 36
SWALLOWING: 0-->SWALLOWS FOODS/LIQUIDS WITHOUT DIFFICULTY
TOILETING: 2-->ASSISTIVE PERSON
ADLS_ACUITY_SCORE: 34
ADLS_ACUITY_SCORE: 30
ADLS_ACUITY_SCORE: 34
ADLS_ACUITY_SCORE: 36
RETIRED_COMMUNICATION: 0-->UNDERSTANDS/COMMUNICATES WITHOUT DIFFICULTY
ADLS_ACUITY_SCORE: 32
ADLS_ACUITY_SCORE: 34
RETIRED_EATING: 0-->INDEPENDENT
ADLS_ACUITY_SCORE: 36
ADLS_ACUITY_SCORE: 23
ADLS_ACUITY_SCORE: 28
ADLS_ACUITY_SCORE: 28
ADLS_ACUITY_SCORE: 34
AMBULATION: 3-->ASSISTIVE EQUIPMENT AND PERSON
ADLS_ACUITY_SCORE: 32
ADLS_ACUITY_SCORE: 28
ADLS_ACUITY_SCORE: 28
ADLS_ACUITY_SCORE: 34
ADLS_ACUITY_SCORE: 34

## 2019-01-01 ASSESSMENT — PAIN DESCRIPTION - DESCRIPTORS: DESCRIPTORS: ACHING

## 2019-01-02 NOTE — PROGRESS NOTES
[facility] Care Coordination Contact      [facility] Six-Month Telephone Assessment    6 month telephone assessment completed on 1/2/2019.     ER visits: No  Hospitalizations: No  TCU stays: For yearly respite.   Significant health status changes: None  Falls/Injuries: No  ADL/IADL changes: No  Changes in services: No    Caregiver Assessment follow up:  Mattie is doing well.    Home visit was scheduled for May 21st - time to be determined at a later date.     Goals: See POC in chart for goal progress documentation.      Will see member in 6 months for an annual health risk assessment.   Encouraged member to call CC with any questions or concerns in the meantime.     Jossy Cortes RN-[facility]   [phone]

## 2019-01-11 NOTE — TELEPHONE ENCOUNTER
Ok to continue every other week SN and 2 as needed for community wellness, education and assessment     Bettie Cabrera RN  [facility]   [phone]    [facility] utilizes an encounter to take the place of a direct phone call to your office. Please take a moment to review the below request. Please reply or route message to author of this encounter.  Message will act as a verbal OK of orders requested below. Thank you.

## 2019-01-22 NOTE — TELEPHONE ENCOUNTER
Home Health Certification and Plan of Care forms received.     Certification Period from 1/16/19 to 3/16/19.    RN reviewed medications.     Forms given to PCP to sign.     CABRERA Ye, RN  [facility]

## 2019-01-25 NOTE — TELEPHONE ENCOUNTER
"Requested Prescriptions   Pending Prescriptions Disp Refills     simvastatin (ZOCOR) 40 MG tablet [Pharmacy Med Name: SIMVASTATIN 40MG TABLET] 90 tablet 3    Last Written Prescription Date:  1/20/18  Last Fill Quantity: 90,  # refills: 3   Last office visit: 10/1/2018 with prescribing provider:     Future Office Visit:     Sig: TAKE 1 TABLET (40 MG) BY MOUTH AT BEDTIME    Statins Protocol Failed - 1/25/2019  1:02 AM       Failed - LDL on file in past 12 months    Recent Labs   Lab Test 01/19/18  0938   LDL 60          Passed - No abnormal creatine kinase in past 12 months    No lab results found.          Passed - Recent (12 mo) or future (30 days) visit within the authorizing provider's specialty    Patient had office visit in the last 12 months or has a visit in the next 30 days with authorizing provider or within the authorizing provider's specialty.  See \"Patient Info\" tab in inbasket, or \"Choose Columns\" in Meds & Orders section of the refill encounter.           Passed - Medication is active on med list       Passed - Patient is age 18 or older      Routing refill request to provider for review/approval because:  Labs not current:  LDL    T'd up refill request, needs labs only at this time    Teresa Barrett RN        "

## 2019-02-19 NOTE — PROGRESS NOTES
[facility] Care Coordination Contact    cc received a call from members spouse, Mattie.  Mattie stated she would like to have her respite be from 24th of June to 24 of July, 2019.  However, she is requesting an extra week to extend that date to the start of August.  cc contacted supervisory staff and requested assistance on how we will proceed with that request. Awaiting response.    Jossy Cortes RN-[facility]   [phone]    2/22/2019  cc received the following update from supervisor, Sena DUPREE RN:      Looks like a facility would do the per shen rate so I would get him admitted under MA or [facility] if he could benefit from some rehab.  Close his EW on day 30 and reopen on his d/c back home.  Sorry       Respite Care:                 For Respite into NF please follow community direct admit policy    This service is purchased at the state established 15 minute unit rates and daily rates.       For out-of-home respite in a certified nursing facility (NF), use the NF s per shen for the client s case mix.    Please discuss the rate with the NF to determine what the per shen will be.      For out-of-home respite in a hospital, use the state established rate.        For out-of-home respite in a setting other than a nursing facility or hospital, use the state established rates.    Use the daily rate for overnight respite or for a service need for 12 or more hours. Use the 15 minute rate for a    stay service need of less than 12 hours that does not include an overnight.       For in-home respite, use the state established rates. Use the daily rate for overnight respite or for a service    need of 12 or more hours. Use the 15 minute rate for a service need of less than 12 hours that does not include    an overnight service            cc updated Mattie of above and will work with facility of choice when Respite dates is closer.    Jossy Cortes RN-[facility] Case  Manager  [phone]

## 2019-02-22 NOTE — TELEPHONE ENCOUNTER
"Requested Prescriptions   Pending Prescriptions Disp Refills     NOVOFINE 30 30G X 8 MM insulin pen needle [Pharmacy Med Name: NOVOFINE AUTOCOVER 30G] 100 each 6    Last Written Prescription Date:  2/2/18  Last Fill Quantity: 100,  # refills: 6   Last office visit: 10/1/2018 with prescribing provider:  10/1/18   Future Office Visit:     Sig: USE WITH INSULIN PEN TWICE A DAY    Diabetic Supplies Protocol Passed - 2/21/2019  9:08 AM       Passed - Medication is active on med list       Passed - Patient is 18 years of age or older       Passed - Recent (6 mo) or future (30 days) visit within the authorizing provider's specialty    Patient had office visit in the last 6 months or has a visit in the next 30 days with authorizing provider.  See \"Patient Info\" tab in inbasket, or \"Choose Columns\" in Meds & Orders section of the refill encounter.            "

## 2019-02-22 NOTE — TELEPHONE ENCOUNTER
Prescription approved per [facility] Refill Protocol.    LEANNE YeN, RN  [facility]

## 2019-03-14 NOTE — TELEPHONE ENCOUNTER
Ok to continue SN every other week, for community wellness, disease management education    SN visits every other week and 2 as needed    Bettie Cabrera RN  [facility] [place]   [phone]    [facility] utilizes an encounter to take the place of a direct phone call to your office. Please take a moment to review the below request. Please reply or route message to author of this encounter.  Message will act as a verbal OK of orders requested below. Thank you.

## 2019-03-22 NOTE — TELEPHONE ENCOUNTER
Home Health Certification and Plan of Care forms received.     Certification Period from 3/17/19 to 5/15/19.    RN reviewed medications.     Forms given to PCP to sign.     CABRERA Ye, RN  [facility]

## 2019-03-25 NOTE — TELEPHONE ENCOUNTER
"Requested Prescriptions   Pending Prescriptions Disp Refills     STOOL SOFTENER/LAXATIVE 50-8.6 MG tablet [Pharmacy Med Name: STOOL SOFTENER/LAXATIVE 50-8.6 MG per tablet] 90 tablet 1    Last Written Prescription Date:  10/2/18  Last Fill Quantity: 90,  # refills: 1   Last office visit: 10/1/2018 with prescribing provider:     Future Office Visit:     Sig: TAKE 1 TABLET BY MOUTH EVERY DAY    Laxatives Protocol Passed - 3/24/2019  5:03 AM       Passed - Patient is age 6 or older       Passed - Recent (12 mo) or future (30 days) visit within the authorizing provider's specialty    Patient had office visit in the last 12 months or has a visit in the next 30 days with authorizing provider or within the authorizing provider's specialty.  See \"Patient Info\" tab in inbasket, or \"Choose Columns\" in Meds & Orders section of the refill encounter.             Passed - Medication is active on med list        Prescription approved per [facility] Refill Protocol.  Teresa Barrett RN    "

## 2019-03-27 NOTE — TELEPHONE ENCOUNTER
Medication is being filled for 1 time refill only due to:  Patient needs to be seen because needs to be seen for 6 month diabetic check on or after 4/1/19.     Will route to scheduling to call and schedule.     LEANNE YeN, RN  [facility]

## 2019-03-27 NOTE — TELEPHONE ENCOUNTER
"Requested Prescriptions   Pending Prescriptions Disp Refills     lisinopril (PRINIVIL/ZESTRIL) 20 MG tablet [Pharmacy Med Name: LISINOPRIL 20MG TABLET] 90 tablet 1    Last Written Prescription Date:  10/1/18  Last Fill Quantity: 90,  # refills: 1   Last office visit: 10/1/2018 with prescribing provider:  10/1/18   Future Office Visit:     Sig: TAKE 1 TABLET BY MOUTH EVERY DAY    ACE Inhibitors (Including Combos) Protocol Failed - 3/27/2019  9:16 AM       Failed - Normal serum creatinine on file in past 12 months    Recent Labs   Lab Test 01/19/18  0938   CR 0.86            Failed - Normal serum potassium on file in past 12 months    Recent Labs   Lab Test 01/19/18  0938   POTASSIUM 4.4            Passed - Blood pressure under 140/90 in past 12 months    BP Readings from Last 3 Encounters:   10/01/18 126/70   06/01/18 110/58   04/19/18 108/55                Passed - Recent (12 mo) or future (30 days) visit within the authorizing provider's specialty    Patient had office visit in the last 12 months or has a visit in the next 30 days with authorizing provider or within the authorizing provider's specialty.  See \"Patient Info\" tab in inbasket, or \"Choose Columns\" in Meds & Orders section of the refill encounter.             Passed - Medication is active on med list       Passed - Patient is age 18 or older        metFORMIN (GLUCOPHAGE) 1000 MG tablet [Pharmacy Med Name: METFORMIN 1000MG TABLET] 180 tablet 1    Last Written Prescription Date:  10/1/18  Last Fill Quantity: 180,  # refills: 1   Last office visit: 10/1/2018 with prescribing provider:  10/1/18   Future Office Visit:     Sig: TAKE 1 TABLET BY MOUTH TWICE A DAY WITH MEALS    Biguanide Agents Failed - 3/27/2019  9:16 AM       Failed - Patient has documented LDL within the past 12 mos.    Recent Labs   Lab Test 01/19/18  0938   LDL 60            Failed - Patient's CR is NOT>1.4 OR Patient's EGFR is NOT<45 within past 12 mos.    Recent Labs   Lab Test " "01/19/18  0938   GFRESTIMATED 86   GFRESTBLACK >90       Recent Labs   Lab Test 01/19/18  0938   CR 0.86            Passed - Blood pressure less than 140/90 in past 6 months    BP Readings from Last 3 Encounters:   10/01/18 126/70   06/01/18 110/58   04/19/18 108/55                Passed - Patient has had a Microalbumin in the past 15 mos.    Recent Labs   Lab Test 01/23/18  0928   MICROL 24   UMALCR 47.09*            Passed - Patient is age 10 or older       Passed - Patient has documented A1c within the specified period of time.    If HgbA1C is 8 or greater, it needs to be on file within the past 3 months.  If less than 8, must be on file within the past 6 months.     Recent Labs   Lab Test 10/01/18  1050   A1C 6.4*            Passed - Patient does NOT have a diagnosis of CHF.       Passed - Medication is active on med list       Passed - Recent (6 mo) or future (30 days) visit within the authorizing provider's specialty    Patient had office visit in the last 6 months or has a visit in the next 30 days with authorizing provider or within the authorizing provider's specialty.  See \"Patient Info\" tab in inbasket, or \"Choose Columns\" in Meds & Orders section of the refill encounter.            "

## 2019-03-27 NOTE — TELEPHONE ENCOUNTER
Called patient, relayed message above. appt scheduled       Isidra Guido ~ Patient Representative  [address] [address]  [email or web address]  [email or web address]  Office:  [phone]  Fax:  [phone]

## 2019-04-21 PROBLEM — I63.9 ENCEPHALOMALACIA WITH CEREBRAL INFARCTION (H): Status: ACTIVE | Noted: 2019-01-01

## 2019-04-21 PROBLEM — R65.21 SEPTIC SHOCK (H): Status: ACTIVE | Noted: 2019-01-01

## 2019-04-21 PROBLEM — Z99.3 WHEELCHAIR BOUND: Status: ACTIVE | Noted: 2019-01-01

## 2019-04-21 PROBLEM — I69.320 APHASIA AS LATE EFFECT OF STROKE: Status: ACTIVE | Noted: 2019-01-01

## 2019-04-21 PROBLEM — R40.4 UNRESPONSIVE EPISODE: Status: ACTIVE | Noted: 2019-01-01

## 2019-04-21 PROBLEM — G93.89 ENCEPHALOMALACIA WITH CEREBRAL INFARCTION (H): Status: ACTIVE | Noted: 2019-01-01

## 2019-04-21 PROBLEM — A41.9 SEPTIC SHOCK (H): Status: ACTIVE | Noted: 2019-01-01

## 2019-04-21 PROBLEM — R19.7 DIARRHEA: Status: ACTIVE | Noted: 2019-01-01

## 2019-04-21 PROBLEM — E87.29 METABOLIC ACIDOSIS, INCREASED ANION GAP: Status: ACTIVE | Noted: 2019-01-01

## 2019-04-21 NOTE — ED NOTES
Family updated on plan for admission and plan for Dr. Alonzo to put in a central line. Consent form obtained, POLST filled out and obtained from wife.  Family in waiting room.  Informed that we will notify when they can come back and see patient.  Dr. Dewitt was in to speak with family as well.

## 2019-04-21 NOTE — ED TRIAGE NOTES
Altered Mental Status. Last known 1200.  Wife noticed decreased responsiveness. EMS reports unresponsive when they picked him up.

## 2019-04-21 NOTE — CONSULTS
Pharmacy Vancomycin Initial Note  Date of Service 2019  Patient's  1940  78 year old, male    Indication: Sepsis    Current estimated CrCl = Estimated Creatinine Clearance: 70.2 mL/min (based on SCr of 1.14 mg/dL).    Creatinine for last 3 days  2019:  2:40 PM Creatinine 1.02 mg/dL;  5:05 PM Creatinine 1.14 mg/dL    Recent Vancomycin Level(s) for last 3 days  No results found for requested labs within last 72 hours.      Vancomycin IV Administrations (past 72 hours)                   vancomycin (VANCOCIN) 2,000 mg in sodium chloride 0.9 % 500 mL intermittent infusion (mg) 2,000 mg New Bag 19 1536                Nephrotoxins and other renal medications (From now, onward)    Start     Dose/Rate Route Frequency Ordered Stop    19 1800  norepinephrine (LEVOPHED) 16 mg in D5W 250 mL infusion      0.03-0.4 mcg/kg/min × 122.5 kg  3.4-45.9 mL/hr  Intravenous CONTINUOUS 19 1755      19 1800  piperacillin-tazobactam (ZOSYN) intermittent infusion 4.5 g      4.5 g  200 mL/hr over 30 Minutes Intravenous EVERY 6 HOURS 19 1759      19 1457  vancomycin (VANCOCIN) 2,000 mg in sodium chloride 0.9 % 500 mL intermittent infusion     Note to Pharmacy:  DO NOT USE THIS FIELD FOR ADMIN INSTRUCTIONS; INFORMATION DOES NOT SHOW ON MAR. USE THE FIELD ABOVE MARKED ADMIN INSTRUCTIONS    2,000 mg  over 2 Hours Intravenous EVERY 12 HOURS 19 1452            Contrast Orders - past 72 hours (72h ago, onward)    Start     Dose/Rate Route Frequency Ordered Stop    19 1436  iopamidol (ISOVUE-370) solution 500 mL      500 mL Intravenous ONCE 19 1435 19 1439                Plan:  1.  Start vancomycin  2000 mg IV q12h.   2.  Goal Trough Level: 15-20 mg/L   3.  Pharmacy will check trough levels as appropriate in 1-3 Days.    4. Serum creatinine levels will be ordered daily for the first week of therapy and at least twice weekly for subsequent weeks.    5. [place] method  utilized to dose vancomycin therapy: Method 2    Carlos Manuel Nunez

## 2019-04-21 NOTE — PROGRESS NOTES
TRANSITIONS OF CARE (DOROTEO) LOG   DOROTEO tasks should be completed by the CC within one (1) business day of notification of each transition. Follow up contact with member is required after return to their usual care setting.  Note:  If CC finds out about the transitions fifteen (15) days or more after the member has returned to their usual care setting, no DOROTEO log is needed. However, the CC should check in with the member to discuss the transition process, any changes needed to the care plan and document it in a case note.    Member Name:  Derek Aragon MCO Name:  [employer] Member ID#: [id number]   Product: [facility] Care Coordinator Contact:  CANDIDA Milian Agency/County/Care System: [facility]   Transition Communication Actions from Care Management Contact   Transition #1   Notification Date:4/21/2019 Transition Date:   4/21/2019 Transition From: Home     Is this the member s usual care setting?               yes Transition To: Hospital, [facility]   Transition Type:  Unplanned  Reason for Admission/Comments:    Altered Mental Status     DX: Sepsis    4/22/2019 -  Home Care notified of hospital Stay.  [facility] also notified by cc and by spouse.    [facility] and Incontinent pads will be cancelled in the next 2-3 days, when POC is determined.       4/26/2019 - Client will transfer to [facility] in [place], this weekend or on Monday, April 29th. [facility] contacted and they will contact Mattie to arrange pick-up of equipment.  [facility] notified to place incontinent order on hold until further notice..  CANDIDA Milian      4/26/2019 - cc spoke with members spouse, Mattie. Emotional support give.  Reviewed her concerns and POC moving forward.. Her goal will be for member to return home following this TCU stay.  CANDIDA Milian    Shared CC contact info, care plan/services with receiving setting--Date completed: 4/21/2019   Notified PCP of transition--Date  completed:  4/21/2019    via  EMR   Transition #2   Transition #3  (if applicable)   Notification Date:5/2/2019     Transition To:  [facility]  Transition Date: 5/2/2019    Transition Type:    Planned  Notified PCP -- Date completed: 5/2/19              Shared CC contact info, care plan/services with receiving setting or, if applicable, home care agency--Date completed:  5/2/19      Comments: 5/3/2019 MMIS entry completed for closure of EW.  DTR completed for all home based services d/t TCU stay being extended to include yearly planned respite d/t caregiver burnout.  CANDIDA Milian     5/8/2019: cc 5/14/ @ 3p.m.  cc spoke with RAYSA Subramanian @ [facility]. Marilynn was unaware of the respite planned during this stay. cc will f/u with [facility] management as Marilynn is concerned with a contract to provide respite. Review will be completed at the cc.   CANDIDA Milian     5/14/2019  Care Conference held today. All TCU staff members, Mattie and this writer were present.  Reviewed therapies: they would like to end the end of this week as they feel member has met his goals and is not progressing.      Mattie verbalized concern that he needs to be using a standing lift for him to return home. He is currently using a ted.  Staff will try to use the standing lift with transfers.    Staff would like to start remeron as as an appetite stimulant as they are concerned with failure to thrive since his admission. Mattie is in agreement with this.     POC will be to transfer from TCU to LTC during the remainder of his stay in the next 1 - 2 weeks.     Another cc will be scheduled in the upcoming month.     CANDIDA Milian  [facility]   [phone]    5/20/2019   cc received VM message from RAYSA Subramanian @ [facility] stating member/ spouse has elected Hospice [facility].     RAYSA Carnes responded with the following email:    This is Marilynn Condelting in . Derek will be enrolled in hospice, as soon as they can  get here, not sure of the date yet.  At this point she does not plan to bring him home    cc will end this transition log as it appears he will remain LTC from this point forward.    Jossy Cortes, DEVENDRA-BC           *Complete tasks below when the member is discharging TO their usual care setting within one (1) business day of notification.  For situations where the Care Coordinator is notified of the discharge prior to the date of discharge, the Care Coordinator must follow up with the member or designated representative to confirm that discharge actually occurred and discuss required DOROTEO tasks as outlined in the DOROTEO Instructions.  (This includes situations where it may be a  new  usual care setting for the member. (i.e., a community member who decides upon permanent nursing home placement following hospitalization and rehab).    Date completed: N/A Communicated with member or their designated representative about the following:  care transition process; about changes to the member s health status; plan of care updates; education about transitions and how to prevent unplanned transitions/readmissions  Four Pillars for Optimal Transition:    Check  Yes  - if the member, family member and/or SNF/facility staff manages the following:    If  No  provide explanation in the comments section.          []  Yes     []  No     Does the member have a follow-up appointment scheduled with primary care or specialist? (Mental health hospitalizations--the appt. should be w/in 7 days)   []  Yes     []  No     Can the member manage their medications or is there a system in place to manage medications (e.g. home care set-up)?         []  Yes     []  No     Can the member verbalize warning signs and symptoms to watch for and how to respond?         []  Yes     []  No     Does the member use a Personal Health Care Record?  Check  Yes  if visit summary, discharge summary, and/or healthcare summary are being used as a PHR.                                                                                                                                                                                     [x] Yes      [] No      Have you updated the member s care plan?  If  No  provide explanation in comments.   Comments:  Transition to LTC:  Goals Updated on CP

## 2019-04-21 NOTE — ED NOTES
DATE:  4/21/2019   TIME OF RECEIPT FROM LAB:  5128  LAB TEST:  Lactic Acid  LAB VALUE:  6.2  RESULTS GIVEN WITH READ-BACK TO (PROVIDER):  Dr. Alonzo and Primary RN Chetna  TIME LAB VALUE REPORTED TO PROVIDER:   6289

## 2019-04-21 NOTE — H&P
[facility]    History and Physical - Hospitalist Service       Date of Admission:  4/21/2019    Assessment & Plan   Derek Aragon is a 78 year old male admitted on 4/21/2019. He presents with symptoms, signs, and test results concerning for an acute life-threatening illness, particularly septic shock although a source for infection has not yet been identified.  He is at high risk for an adverse outcome including worsening multisystem organ failure and death, so hospitalization is considered medically necessary.  Based on the presently available information, hospitalization for at least 2 midnights is anticipated.  Prognosis for recovery from this acute illness is guarded as this could be a terminal illness as discussed with the patient's family today.    Active Problems:    Septic shock (H)  Assessment: Fever, leukocytosis, lactate 6.2, and severe hypotension with delayed capillary refill on exam is consistent with possible septic shock although clinical source for infection is not yet apparent, clinical evaluation for infection is limited however by his chronic neurologic impairments including aphasia, also has mild coagulopathy with mildly elevated INR and EKG changes consistent with possible myocardial ischemia although troponin is normal, gastrointestinal infection, ischemia, or infarction is possible although absence of abdominal pain and peritoneal signs makes bowel perforation and bowel infarction less likely  Plan: ICU admission, continue aggressive IV fluid resuscitation and adjust IV fluid rate to maintain normal CVP, central venous catheter placed for CVP monitoring and vasopressor administration to start for mean arterial pressure less than 65, continue empiric Zosyn and vancomycin therapy while awaiting culture results, check procalcitonin, consider abdominal imaging depending upon his clinical course, hold chronic antihypertensive medications      Peripheral vascular  disease (H)  Assessment: Per old records  Plan: Holding chronic medications including aspirin for now      Type 2 diabetes mellitus with complication, with long-term current use of insulin (H)  Assessment: Generally controlled with most recent A1c 6.4 in October 2018, moderately hyperglycemic at presentation today  Plan: Start IV insulin infusion to optimize glycemic control while in the ICU if blood sugar is greater than 150, anticipate transition back to his usual doses of insulin if his condition stabilizes and diet is advanced      Diarrhea  Assessment: Onset after arrival in the ER today, no premonitory symptoms in the last few days, could be due to bowel ischemia from severe hypotension although infection cannot be completely excluded, diarrheal fluid losses complicate attempted fluid resuscitation for treatment of shock, no known risk factors for gastrointestinal infection including C. difficile  Plan: Test for enteric pathogens, enteric isolation for now, keep n.p.o. for now      Metabolic acidosis, increased anion gap  Assessment: Present initially in the ER although appears to be improving with initial treatment given in the ER today, probably due at least partly to lactic acidosis but lactic acid remains significantly elevated and has not improved despite initial IV fluid resuscitation in the ER  Plan: We will follow acid-base status closely and adjust treatment plan accordingly      Unresponsive episode  Assessment: Prolonged unresponsiveness at home probably due to severe hypotension, no witnessed seizure activity today and no previous history of seizures although has significant encephalomalacia after old stroke, seizure activity could also cause lactic acidosis and fever although hypotension is less common from seizures, occult cardiac dysrhythmia is possible although has not been overtly apparent  Plan: Follow neurologic status closely, maintain normotension      Encephalomalacia with old cerebral  infarction (H) right MCA territory  Assessment: Chronic and increases risk for the possibility of seizures  Plan: No specific intervention at this time, close clinical monitoring      MIO (obstructive sleep apnea)  Assessment: Chronic  Plan: Could use BiPAP if necessary during sleep      Hemiparesis of left nondominant side as late effect of cerebral infarction (H)  Assessment: Chronic and appears to be stable  Plan: No specific interventions aside from nursing cares to reduce his risk for skin injury      History of CVA (cerebrovascular accident) wit left hemiparesis  Assessment: Old, acute stroke not suspected after initial evaluation in the ER  Plan: Holding aspirin at this time while he is n.p.o.      Essential hypertension with goal blood pressure less than 140/90  Assessment: Severely hypotensive today  Plan: Hold chronic antihypertensive medications and Lasix      Obesity, morbid, BMI 40.0-49.9 (H)  Assessment: Chronic  Plan: No acute interventions      Aphasia as late effect of stroke  Assessment: Chronic and limits history from the patient himself  Plan: No acute interventions      Wheelchair bound  Assessment: Chronic, normally transfers using a lift at home according to his family  Plan: Anticipate PT evaluation for disposition planning if he stabilizes medically, family already asking about potential need for discharge to a TCU depending upon whether he survives and his subsequent clinical course       Diet: NPO for Medical/Clinical Reasons Except for: No Exceptions    DVT Prophylaxis: Enoxaparin (Lovenox) SQ  Cast Catheter: in place, indication: Strict 1-2 Hour I&O  Code Status: DNR/DNI      Disposition Plan   Expected discharge: 4 - 7 days, recommended to To be determined once Medically stable if he survives.  Entered: Celso Dewitt MD 04/21/2019, 6:04 PM     The patient's care was discussed with the Bedside Nurse, Patient and Patient's Family.    Celso Dewitt MD  [facility]  [facility]    ______________________________________________________________________    Chief Complaint   Unresponsiveness    History is obtained from the patient, electronic health record, emergency department physician, paper chart and patient's family    History of Present Illness   Derek Aragon is a 78 year old male who was in his usual health at noon today when his family saw him at home.  He had to drink boost this morning but had not otherwise eaten in anticipation of eating a bigger dinner for Erin with his family.  He normally sits in his room in a wheelchair and has been sitting by himself as his family visited in the other room at home.  At about 1 PM when his family went to check on him to bring him to the meal, they found him to be unresponsive.  They said that he appeared to be drooping in both of his eyes and right side of his face, slumping forward and down in his wheelchair, and that his color appeared purple or blue although he did seem to be breathing on his own.  They called 911 and reports that the ambulance arrived within about 15 minutes.  He had remained unresponsive for that entire period of time despite them trying to awaken him.  EMS reports that the patient was initially unresponsive and had blood pressure as low as 74/42 with heart rate 109.  Family had checked his blood sugar and it was elevated over 200.  Oxygen saturations were in the 90s initially.  In the course of evaluation by EMS, patient started to become more responsive.  An IV was placed in normal saline bolus was started 250 mL.  Both family and EMS noted that he appeared to be gagging as if he is going to vomit, so EMS administered Zofran.  He was transported to the emergency room uneventfully and was severely hypotensive upon arrival in the emergency room.  He had a very large diarrheal stool in the emergency room.  He initially underwent stat head CT and head and neck CT angiogram because of concern for possible  stroke.  He was found to be febrile in the ER with initial lactic acid 6.2.  He was treated with IV fluids in the ER and appears to have received approximately 2.7 L total so far of IV fluids.  Cast catheter was placed in the ER without any immediate return of urine and with only a small amount of urinary return subsequently.  Right internal jugular vein central venous catheter was placed in the ER.  Hypotension started to improve with IV fluid resuscitation and vasopressors have not yet been started.  I evaluated the patient in the ER at 3:50 PM and again now at the time of ICU admission.    Review of Systems    Review of systems is limited by patient factors -aphasia such that he tends to answer yes and no questions appropriately but cannot otherwise elaborate with his answers, some of the review of systems is obtained from discussion with his family  CONSTITUTIONAL:  positive for fever and sweats today with this acute episode but not previously today nor in recent days  EYES:  negative for  eye discharge and irritation  HEENT:  negative for recent runny nose, nasal congestion and sore throat  RESPIRATORY:  negative for  dry cough, cough with sputum and dyspnea  CARDIOVASCULAR:  negative for  chest pain  GASTROINTESTINAL:  positive for reflux and regurgitation today at home as he was waking after the unresponsive episode although he did not obviously vomit and he has not otherwise had recent nausea or vomiting, positive for diarrhea in the ER today but he had a normal soft bowel movement this morning at home,  negative for abdominal pain, hemtochezia and melena  GENITOURINARY:  negative for dysuria and hematuria and family has not noted any recent change in his urinary habits  INTEGUMENT/BREAST:  negative for rash and skin lesion(s)  HEMATOLOGIC/LYMPHATIC:  negative for bleeding  ALLERGIC/IMMUNOLOGIC:  negative for drug reactions  MUSCULOSKELETAL:  negative for  arthralgias and joint swelling  NEUROLOGICAL:   negative for headaches, difficult to know whether he has had any new confusion today but his family reports that he has been acting like his usual self up until the episode this afternoon    Past Medical History    I have reviewed this patient's medical history and updated it with pertinent information if needed.   Past Medical History:   Diagnosis Date     Anxiety state, unspecified     Mixed anxiety/depression     Diabetic eye exam (H) 10/10/12     Impotence of organic origin      Obesity, morbid, BMI 40.0-49.9 (H) 10/27/2015     Obesity, unspecified      Pure hypercholesterolemia      Sleep disturbance, unspecified     Sleep apnea     Type II or unspecified type diabetes mellitus without mention of complication, not stated as uncontrolled      Unspecified cerebral artery occlusion with cerebral infarction      Unspecified essential hypertension      No known history of seizures.  They deny any recent infections or treatment with antibiotics.    Past Surgical History   I have reviewed this patient's surgical history and updated it with pertinent information if needed.  Past Surgical History:   Procedure Laterality Date     C OPEN RX FEMUR FX+INTRAMED EDE Left          COLONOSCOPY  2012    Procedure: COLONOSCOPY;  colonoscopy;  Surgeon: Chris Crockett MD;  Location:  GI     ENT SURGERY      Parotid gland tumor removal     HC REMOVAL GALLBLADDER      Cholecystectomy       Social History   I have reviewed this patient's social history and updated it with pertinent information if needed.  Social History     Tobacco Use     Smoking status: Former Smoker     Packs/day: 1.00     Years: 20.00     Pack years: 20.00     Types: Cigarettes     Last attempt to quit: 1985     Years since quittin.3     Smokeless tobacco: Never Used   Substance Use Topics     Alcohol use: No     Drug use: No     He lives at home with his spouse.  He is wheelchair-bound and they have a lift at home.  He can apparently  uses wheelchair for mobility.  His speech is minimal.  There are no recent ill contacts.  There is no known exposure to contaminated food or water.  He has not had any recent travel.    Family History   I have reviewed this patient's family history and updated it with pertinent information if needed.   Family History   Problem Relation Age of Onset     Cardiovascular Mother         MI age 75 yrs     Diabetes Mother      Obesity Mother      Cerebrovascular Disease Father         anneurysm age 52      Obesity Maternal Grandmother      Psychotic Disorder Maternal Grandmother         suicide     Psychotic Disorder Maternal Grandfather         suicide     Lipids Sister      There is no known family history of gastrointestinal disease.    Prior to Admission Medications   Prior to Admission Medications   Prescriptions Last Dose Informant Patient Reported? Taking?   DULoxetine (CYMBALTA) 30 MG EC capsule 2019 at 0900  No Yes   Sig: TAKE 1 CAPSULE BY MOUTH ONCE  A DAY   LANTUS SOLOSTAR 100 UNIT/ML soln 2019 at 0900  No Yes   Sig: 15 units every morning and 25 units every HS   Miconazole Nitrate POWD powder Unknown at Unknown time  No No   Sig: Apply topically daily as needed   Patient not taking: Reported on 10/1/2018   NOVOFINE 30 30G X 8 MM insulin pen needle 2019 at 0900  No Yes   Sig: USE WITH INSULIN PEN TWICE A DAY   Nystatin (NYSTOP) 833482 UNIT/GM POWD Unknown at Unknown time  No No   Sig: APPLY TO BUTTOCKS; GROIN; AND PERINEUM 3 TIMES A DAY AS NEEDED.   Patient not taking: Reported on 2018   ORDER FOR DME, SET TO LOCAL PRINT,   No No   Sig: Equipment being ordered: CPAP at previous home settings   Patient not taking: Reported on 2018   STOOL SOFTENER/LAXATIVE 50-8.6 MG tablet Unknown at Unknown time  No No   Sig: TAKE 1 TABLET BY MOUTH EVERY DAY   acyclovir (ZOVIRAX) 5 % cream Unknown at Unknown time  No No   Sig: Apply topically 5 times daily   Patient not taking: Reported on 2018    albuterol (2.5 MG/3ML) 0.083% nebulizer solution Unknown at Unknown time  No No   Sig: NEBULIZE 1 VIAL BY MOUTH EV NANCY 6 HOURS AS NEEDED FOR W HEEZING / SHORTNESS OF DREW TH /DYSPNEA   Patient not taking: Reported on 6/1/2018   aspirin (ASA) 325 MG EC tablet 4/21/2019 at 0900  No Yes   Sig: TAKE 1 TABLET BY MOUTH ONCE DAILY   blood glucose monitoring (MICHELLE MICROLET) lancets   No No   Sig: Use to test blood sugar 4 times daily or as directed.   blood glucose monitoring (NO BRAND SPECIFIED) test strip   No No   Sig: Use to test blood sugar daily or as directed.   docusate sodium (COLACE) 100 MG tablet 4/20/2019 at 2100  No Yes   Sig: Take 100 mg by mouth daily   furosemide (LASIX) 20 MG tablet 4/21/2019 at 0900  No Yes   Sig: Take 1 tablet (20 mg) by mouth daily   hydrochlorothiazide (HYDRODIURIL) 25 MG tablet 4/20/2019 at 2100  No Yes   Sig: Take 1 tablet (25 mg) by mouth daily   lisinopril (PRINIVIL/ZESTRIL) 20 MG tablet 4/21/2019 at 0900  No Yes   Sig: TAKE 1 TABLET BY MOUTH EVERY DAY   loratadine (ALLERGY RELIEF) 10 MG tablet Unknown at Unknown time  Yes No   Sig: Take 1 tablet (10 mg) by mouth daily as needed for allergies BRAND NAME IS EQUATE   metFORMIN (GLUCOPHAGE) 1000 MG tablet 4/21/2019 at 0900  No Yes   Sig: TAKE 1 TABLET BY MOUTH TWICE A DAY WITH MEALS   order for DME   Yes No   Sig: Equipment ordered: RESMED Auto PAP Mask type: Full face  Settings: 5-10 CM H2O   rOPINIRole (REQUIP) 0.25 MG tablet Unknown at Unknown time  No No   Sig: Take 1 tablet (0.25 mg) by mouth At Bedtime   simvastatin (ZOCOR) 40 MG tablet 4/20/2019 at 2100  No Yes   Sig: TAKE 1 TABLET (40 MG) BY MOUTH AT BEDTIME      Facility-Administered Medications: None     Allergies   Allergies   Allergen Reactions     No Known Drug Allergies        Physical Exam   Vital Signs: Temp: 96.4  F (35.8  C) Temp src: Rectal(rectal probe) BP: 116/61 Pulse: 86 Heart Rate: 82 Resp: (!) 65 SpO2: 96 %      Weight: 270 lbs 0 oz    General Appearance:  Ill-appearing, flushed, mildly diaphoretic morbidly obese man, somnolent  Eyes: Anicteric sclerae, clear conjunctivae, symmetric pupils, does make eye contact after waking  HEENT: No obvious signs of recent head trauma, clear ear canals and nares, normal oropharynx with moist mucous membranes and no obvious oral injury  Neck: Trachea midline, symmetric, supple, nontender, no stridor although his voice is hoarse  Respiratory: Present respiratory rate is normal and not elevated, normal respiratory effort, breath sounds are diminished throughout with clear lung fields  Cardiovascular: Regular rate and rhythm, no murmur, weak radial pulse, capillary refill 3 to 4 seconds in the fingertips and toes  GI: Morbidly obese abdomen, hypoactive bowel sounds, soft abdomen, no apparent abdominal tenderness, no peritoneal signs  Lymph/Hematologic: No cervical or supraclavicular lymphadenopathy  Genitourinary: Normal genitalia  Skin: Lower extremities appear mildly mottled, hands and feet are cool but appear pink  Musculoskeletal: Muscle atrophy evident in the lower extremities bilaterally, left upper extremity is contracted, no acute appearing limb abnormalities, no acutely inflamed joints, no cords appreciated in the lower extremities  Neurologic: Somnolent but opens eyes to voice, limited vocalization but does appear to answer yes and no appropriately to specific questions, able to follow simple instructions such as when he is asked to open his mouth    Data   Data reviewed today: I reviewed all medications, new labs and imaging results over the last 24 hours. I personally reviewed the EKG tracing showing Normal sinus rhythm with 1 mm ST segment depression and T wave inversions in leads I, aVL, and V2 through V5.    Recent Labs   Lab 04/21/19  1705 04/21/19  1440   WBC  --  13.7*   HGB  --  14.8   MCV  --  94   PLT  --  283   INR  --  1.24*    138   POTASSIUM 4.4 4.3   CHLORIDE 108 103   CO2 20 19*   BUN 28 28   CR 1.14  1.02   ANIONGAP 12 16*   MAKENNA 8.4* 9.6   * 236*   ALBUMIN  --  3.5   PROTTOTAL  --  8.0   BILITOTAL  --  0.6   ALKPHOS  --  109   ALT  --  20   AST  --  22   TROPI  --  <0.015     Recent Results (from the past 24 hour(s))   CT Head w/o Contrast    Narrative    HEAD CT AND CT ANGIOGRAM OF THE HEAD AND NECK WITHOUT AND WITH  CONTRAST  4/21/2019 2:37 PM     COMPARISON: Head CT 7/31/2015 and head MRI 1/23/2013    HISTORY: Facial droop. Decreased mental status. Code Stroke.    TECHNIQUE:    HEAD CT: Axial CT images of the head from the skull base to the vertex  were acquired without IV contrast.  HEAD AND NECK CTA: Precontrast localizing scans were followed by CT  angiography with an injection of 70mL, Isovue 370 nonionic intravenous  contrast material with scans through the head and neck.  Images were  transferred to a separate 3-D workstation where multiplanar  reformations and 3-D images were created.  Estimates of carotid  stenoses are made relative to the distal internal carotid artery  diameters except as noted.      FINDINGS:   HEAD CT:  INTRACRANIAL CONTENTS: Extensive encephalomalacia again noted in the  right MCA territory with involvement of the posterolateral right  lentiform nucleus. Encephalomalacia also noted in the right thalamus.  Chronic lacunar infarct in the genu of the left internal capsule.  Proportional prominence of the ventricular system. These findings are  stable dating back to 2013.    VISUALIZED ORBITS/SINUSES/MASTOIDS: No significant orbital  abnormality. No significant paranasal sinus mucosal disease. No  significant middle ear or mastoid effusion.    OSSEOUS STRUCTURES/SOFT TISSUES: No significant abnormality.    HEAD CTA:  ANTERIOR CIRCULATION: Decreased arborization throughout the right MCA  territory. This correlates with the areas of encephalomalacia. The  anterior circulation is otherwise normal. Essentially fetal right  posterior cerebral artery.    POSTERIOR CIRCULATION: No  significant stenosis or occlusion. Balanced  vertebral arteries supply a normal basilar artery.    ANEURYSM/VASCULAR MALFORMATION: None.    NECK CTA:  RIGHT CAROTID: No measurable stenosis in the right ICA based on NASCET  criteria.    LEFT CAROTID: No measurable stenosis in the left ICA based on NASCET  criteria.     VERTEBRAL ARTERIES: Dominant left and smaller right vertebral  arteries, patent in the neck and into the head.     AORTIC ARCH: Classic aortic arch anatomy with no significant stenosis  at the origin of the great vessels.      Impression    IMPRESSION:  HEAD CT:  1.  No mass, hemorrhage or acute stroke.  2.  Large area of right cerebral encephalomalacia largely in the right  MCA territory but also extending into the right thalamus. This pattern  correlates with the patient's essentially fetal right PCA origin.  3.  Chronic infarct in the genu of the left internal capsule.  4.  No change in the appearance of the brain dating back to 2013.    HEAD CTA:  1.  Decreased arborization throughout the right MCA territory  correlating with the areas of encephalomalacia.  2.  The remainder of the head CTA is normal.  3.  Essentially fetal origin of the right posterior cerebral artery.    NECK CTA:  1.  Normal neck CTA  for age.  2.  No significant stenosis as per NASCET criteria.    Nonenhanced head CT findings were discussed with Dr. Alonzo at 2:41 PM  hours and head and neck CTA findings were discussed with Dr. Alonzo at  3:00 PM hours on 4/21/2019.    Radiation dose for this scan was reduced using automated exposure  control, adjustment of the mA and/or kV according to patient size, or  iterative reconstruction technique.      GISSELLE LAURA MD   CT Head Neck Angio w/o & w Contrast    Narrative    HEAD CT AND CT ANGIOGRAM OF THE HEAD AND NECK WITHOUT AND WITH  CONTRAST  4/21/2019 2:37 PM     COMPARISON: Head CT 7/31/2015 and head MRI 1/23/2013    HISTORY: Facial droop. Decreased mental status. Code  Stroke.    TECHNIQUE:    HEAD CT: Axial CT images of the head from the skull base to the vertex  were acquired without IV contrast.  HEAD AND NECK CTA: Precontrast localizing scans were followed by CT  angiography with an injection of 70mL, Isovue 370 nonionic intravenous  contrast material with scans through the head and neck.  Images were  transferred to a separate 3-D workstation where multiplanar  reformations and 3-D images were created.  Estimates of carotid  stenoses are made relative to the distal internal carotid artery  diameters except as noted.      FINDINGS:   HEAD CT:  INTRACRANIAL CONTENTS: Extensive encephalomalacia again noted in the  right MCA territory with involvement of the posterolateral right  lentiform nucleus. Encephalomalacia also noted in the right thalamus.  Chronic lacunar infarct in the genu of the left internal capsule.  Proportional prominence of the ventricular system. These findings are  stable dating back to 2013.    VISUALIZED ORBITS/SINUSES/MASTOIDS: No significant orbital  abnormality. No significant paranasal sinus mucosal disease. No  significant middle ear or mastoid effusion.    OSSEOUS STRUCTURES/SOFT TISSUES: No significant abnormality.    HEAD CTA:  ANTERIOR CIRCULATION: Decreased arborization throughout the right MCA  territory. This correlates with the areas of encephalomalacia. The  anterior circulation is otherwise normal. Essentially fetal right  posterior cerebral artery.    POSTERIOR CIRCULATION: No significant stenosis or occlusion. Balanced  vertebral arteries supply a normal basilar artery.    ANEURYSM/VASCULAR MALFORMATION: None.    NECK CTA:  RIGHT CAROTID: No measurable stenosis in the right ICA based on NASCET  criteria.    LEFT CAROTID: No measurable stenosis in the left ICA based on NASCET  criteria.     VERTEBRAL ARTERIES: Dominant left and smaller right vertebral  arteries, patent in the neck and into the head.     AORTIC ARCH: Classic aortic arch  anatomy with no significant stenosis  at the origin of the great vessels.      Impression    IMPRESSION:  HEAD CT:  1.  No mass, hemorrhage or acute stroke.  2.  Large area of right cerebral encephalomalacia largely in the right  MCA territory but also extending into the right thalamus. This pattern  correlates with the patient's essentially fetal right PCA origin.  3.  Chronic infarct in the genu of the left internal capsule.  4.  No change in the appearance of the brain dating back to 2013.    HEAD CTA:  1.  Decreased arborization throughout the right MCA territory  correlating with the areas of encephalomalacia.  2.  The remainder of the head CTA is normal.  3.  Essentially fetal origin of the right posterior cerebral artery.    NECK CTA:  1.  Normal neck CTA  for age.  2.  No significant stenosis as per NASCET criteria.    Nonenhanced head CT findings were discussed with Dr. Alonzo at 2:41 PM  hours and head and neck CTA findings were discussed with Dr. Alonzo at  3:00 PM hours on 4/21/2019.    Radiation dose for this scan was reduced using automated exposure  control, adjustment of the mA and/or kV according to patient size, or  iterative reconstruction technique.      GISSELLE LAURA MD   POC US ECHO LIMITED    Impression    Error in type of ultrasound, this was for needle placement.    Ultrasound used for placement of central line for needle guidance.  Real-time central line placement was performed using ultrasound guidance with successful placement of right-sided internal jugular vein catheter.  Wire visualized in the middle of the internal jugular vein.       XR Chest Port 1 View    Narrative    CHEST PORTABLE ONE VIEW   4/21/2019 3:07 PM     COMPARISON: Two view chest x-ray 1/22/2013.    HISTORY: Hypotension, weakness.    FINDINGS: The cardiac silhouette, pulmonary vasculature, lungs and  pleural spaces are within normal limits.      Impression    IMPRESSION: Clear lungs.   Chest  XR, 1 view portable     Narrative    CHEST PORTABLE ONE VIEW   4/21/2019 5:02 PM     HISTORY: Post central line placement.      Impression    IMPRESSION: Right internal jugular central line is in place with the  tip projecting in the mid superior vena cava. There may be linear  atelectasis overlapping the right and left hemidiaphragms. The lungs  are otherwise grossly clear.      Blood and urine cultures are pending, stool testing for enteric pathogens is pending    Stool testing for occult blood is positive

## 2019-04-21 NOTE — ED PROVIDER NOTES
History     Chief Complaint   Patient presents with     Altered Mental Status     The history is provided by the EMS personnel.     Derek Aragon is a 78 year old male who presents to the emergency via EMS department for stroke like symptoms.  Per EMS, patient was at home when at 1300 he became unresponsive per his family. They checked his blood sugar which was 229. When EMS arrived, patient was very pale, diaphoretic, hypotensive and had a right facial droop. Patient is also having confusion per EMS, he only know his name and date of birth. He O2 sats on room air was 88% at the highest. Patient has left sided weakness due to a previous stroke.    He has not been ill recently per his daughter.  He does not use oxygen at home but he does use CPAP.  He is DNR/DNI.  Allergies:  Allergies   Allergen Reactions     No Known Drug Allergies        Problem List:    Patient Active Problem List    Diagnosis Date Noted     Anal fissure 12/21/2012     Priority: High     Diarrhea 04/21/2019     Priority: Medium     Metabolic acidosis, increased anion gap 04/21/2019     Priority: Medium     Unresponsive episode 04/21/2019     Priority: Medium     Type 2 diabetes mellitus with complication, with long-term current use of insulin (H) 10/26/2016     Priority: Medium     Adjustment disorder with depressed mood 08/15/2016     Priority: Medium     Type 2 diabetes mellitus with diabetic neuropathy (H) 11/30/2015     Priority: Medium     Obesity, morbid, BMI 40.0-49.9 (H) 10/27/2015     Priority: Medium     Pain of left lower leg 10/20/2015     Priority: Medium     Yeast dermatitis 06/19/2015     Priority: Medium     Generalized muscle weakness 06/02/2015     Priority: Medium     Pyuria 06/02/2015     Priority: Medium     Other specified disease of nail 08/13/2014     Priority: Medium     Dermatophytosis of nail 08/13/2014     Priority: Medium     Essential hypertension with goal blood pressure less than 140/90 08/29/2012     Priority:  "Medium     Health Care Home 2012     Priority: Medium     [facility]    CANDIDA Milian  [phone]  [facility] CARE PLAN SUMMARY    Client Name:  Derek Aragon  Address:  [address] [place] [id number] Phone: [phone] (home)    :  1940 Date of Assessment:  7/3/2018   Health Plan:  [employer] [facility]  Health Plan Number: [id number] Medical Assistance Number: [id number]  Financial Worker:  Anastasiia ZIMMER [phone]  Case #:  [id number]   [facility] :  Jossy Cortes RN-BC  Phone:  [phone]   Fax:  [phone]   [facility] Enrollment Date: 2012 Case Mix:  J  Rate Cell:  B  Waiver Type:  EW   Emergency Contact: TAYE ARAGON  Address: [address] [place] [id number]   Home Phone: [phone]  Mobile Phone: [phone]  Relation: Spouse    Secondary Emergency Contact: LORETTA MCCURDY  Home Phone: [phone]  Mobile Phone: [phone]  Relation: Daughter Language:  English  :  No   Health Care Agent/POA:  No Advanced Directives/Living Will:  POLST on file with [facility]   Primary Care Clinic/Phone/Fax:  [facility]/(p) [phone], (f) [phone] Primary Dx: Diabetes E11.59  Secondary Dx: Hemiplegia G81.90   Primary Physician:  Rosalino Melton   Height:  5' 9\"   Weight:  281 lbs   Specialty Physician:    Audiologist:     Eye Care Provider:   Dental Care Provider:    [facility] [phone]   Other:          [facility] CURRENT SERVICES SUMMARY  Equipment owned/DME history: [employer]: Sit to stand lift, hospital bed and trapeeze, rolling commode   [facility] mobility: shower bench with sliding seat reliable medical: wheelchair, exercise pedals    SERVICE TYPE/PROVIDER NAME/PHONE AUTH DATE FREQUENCY Units OR $ Amt DESCRIPTION   Medical Transportation: [facility] [phone] 2018 - 2019 as needed Varies    PCA: [facility] [phone]  Fax: [phone] 7/3/2018 - 2019 daily $5,168.75 " /Mo    $30.56/Mo 11hrs/day    Supervision - Per agency POC   HHA/RN: [facility] [phone]  Fax:  7/1/2018 - 6/30/2019 2 Visits per Month  Medicare    Personal Emergency Response: [facility] [phone]  Fax:  7/1/2018 - 6/30/2019 daily $39.00/Mo Standard Unit    Supplies: [facility] [phone]  Fax:  7/1/2018 - 6/30/2019 monthly $31.92/Mo Depends, personal wipes  Monthly delivery               * For ADC please select ADC provider and EW Transportation in order to process auth            DX V65.8 REPLACED WITH [id number] HEALTH CARE HOME (04/08/2013)                                             History of CVA (cerebrovascular accident) wit left hemiparesis 03/22/2012     Priority: Medium     Ischemic etiology       Back pain 03/22/2012     Priority: Medium     Insomnia 03/14/2012     Priority: Medium     Peripheral vascular disease (H) 03/04/2012     Priority: Medium     Problem list name updated by automated process. Provider to review       Hemiparesis of left nondominant side as late effect of cerebral infarction (H) 02/27/2012     Priority: Medium     Anxiety state 07/01/2002     Priority: Medium     Problem list name updated by automated process. Provider to review       DVT prophylaxis 01/22/2013     Priority: Low     Advance Care Planning 12/19/2012     Priority: Low       Advance Care Planning 7/14/2016 / Reviewed on 7/12/17: Receipt of ACP document:  Received: Health Care Directive which was witnessed or notarized on 4-16-90.  Document previously scanned on 6-5-15.  Code Status reflects choices in most recent ACP document.  Confirmed/documented designated decision maker(s).  Added by Jossy Cortes       Advance Care Planning 8/6/2015: Receipt of ACP document:  Received: Health Care Directive which was witnessed or notarized on 4-16-90.  Document previously scanned on 6-5-15.  Validation form completed and sent to be scanned.  Code Status reflects  choices in most recent ACP document.  Confirmed/documented designated decision maker(s).  Added by Cassandra Hunter RN, System ACP Coordinator Honoring Choices   Advance Care Plannin/3/14  Receipt of ACP document:  Received: Resuscitation Guidelines order which was witnessed and signed by provider on 6-10-14.  Document previously scanned on 6-10-14. Also in receipt of POLST signed and scanned on 3-1-12. Orders reviewed and found to be valid.  Code Status reflects choices in most recent ACP document.  Confirmed/documented designated decision maker(s). See permanent comments section of demographics in clinical tab. View document(s) and details by clicking on code status.  Added by Cassandra Huntre RN, System ACP Coordinator Honoring Choices  on 12/3/2014.               Constipation 2012     Priority: Low     Morbid obesity (H) 2012     Priority: Low     Mild major depression (H) 2012     Priority: Low     MIO (obstructive sleep apnea) 2012     Priority: Low     Restless leg syndrome 2012     Priority: Low     Hyperlipidemia LDL goal <100 10/31/2010     Priority: Low        Past Medical History:    Past Medical History:   Diagnosis Date     Anxiety state, unspecified      Diabetic eye exam (H) 10/10/12     Impotence of organic origin      Obesity, morbid, BMI 40.0-49.9 (H) 10/27/2015     Obesity, unspecified      Pure hypercholesterolemia      Sleep disturbance, unspecified      Type II or unspecified type diabetes mellitus without mention of complication, not stated as uncontrolled      Unspecified cerebral artery occlusion with cerebral infarction      Unspecified essential hypertension        Past Surgical History:    Past Surgical History:   Procedure Laterality Date     C OPEN RX FEMUR FX+INTRAMED EDE Left     2016     COLONOSCOPY  2012    Procedure: COLONOSCOPY;  colonoscopy;  Surgeon: Chris Crockett MD;  Location:  GI     ENT SURGERY      Parotid gland tumor removal      HC REMOVAL GALLBLADDER      Cholecystectomy       Family History:    Family History   Problem Relation Age of Onset     Cardiovascular Mother         MI age 75 yrs     Diabetes Mother      Obesity Mother      Cerebrovascular Disease Father         anneurysm age 52      Obesity Maternal Grandmother      Psychotic Disorder Maternal Grandmother         suicide     Psychotic Disorder Maternal Grandfather         suicide     Lipids Sister        Social History:  Marital Status:   [2]  Social History     Tobacco Use     Smoking status: Former Smoker     Packs/day: 1.00     Years: 20.00     Pack years: 20.00     Types: Cigarettes     Last attempt to quit: 1985     Years since quittin.3     Smokeless tobacco: Never Used   Substance Use Topics     Alcohol use: No     Drug use: No        Medications:      aspirin (ASA) 325 MG EC tablet   docusate sodium (COLACE) 100 MG tablet   DULoxetine (CYMBALTA) 30 MG EC capsule   furosemide (LASIX) 20 MG tablet   hydrochlorothiazide (HYDRODIURIL) 25 MG tablet   LANTUS SOLOSTAR 100 UNIT/ML soln   lisinopril (PRINIVIL/ZESTRIL) 20 MG tablet   metFORMIN (GLUCOPHAGE) 1000 MG tablet   NOVOFINE 30 30G X 8 MM insulin pen needle   simvastatin (ZOCOR) 40 MG tablet   acyclovir (ZOVIRAX) 5 % cream   albuterol (2.5 MG/3ML) 0.083% nebulizer solution   blood glucose monitoring (MICHELLE MICROLET) lancets   blood glucose monitoring (NO BRAND SPECIFIED) test strip   loratadine (ALLERGY RELIEF) 10 MG tablet   Miconazole Nitrate POWD powder   Nystatin (NYSTOP) 634694 UNIT/GM POWD   ORDER FOR DME, SET TO LOCAL PRINT,   order for DME   rOPINIRole (REQUIP) 0.25 MG tablet   STOOL SOFTENER/LAXATIVE 50-8.6 MG tablet         Review of Systems   Unable to perform ROS: Mental status change       Physical Exam   BP: (!) 72/44  Pulse: 96  Heart Rate: 91  Temp: 101.3  F (38.5  C)  Resp: (!) 0  Weight: 122.5 kg (270 lb)  SpO2: (!) 88 %       Physical Exam   Constitutional: He appears well-developed  "and well-nourished. No distress.   HENT:   Head: Normocephalic and atraumatic.   Mouth/Throat: Oropharynx is clear and moist.   Eyes: Pupils are equal, round, and reactive to light. No scleral icterus.   Cardiovascular: Normal heart sounds and intact distal pulses.   Pulmonary/Chest: Breath sounds normal. No respiratory distress.   Abdominal: Soft. Bowel sounds are normal. There is no tenderness.   Musculoskeletal: He exhibits no edema or tenderness.   Left-sided hemiparesis   Neurological: He is alert. No cranial nerve deficit.   Left-sided hemiparesis with difficulty with dysarthria and expressive aphasia.  No facial droop noted.  Cranial nerves 2 through 12 otherwise intact.   Skin: Skin is warm. Capillary refill takes less than 2 seconds. No rash noted. He is not diaphoretic.   Psychiatric: He has a normal mood and affect. His behavior is normal.   Nursing note and vitals reviewed.      ED Course        Central line  Date/Time: 4/21/2019 5:24 PM  Performed by: Chris Alonzo MD  Authorized by: Chris Alonzo MD   Consent: Verbal consent obtained. Written consent obtained.  Risks and benefits: risks, benefits and alternatives were discussed  Consent given by: power of   Patient understanding: patient states understanding of the procedure being performed  Patient consent: the patient's understanding of the procedure matches consent given  Procedure consent: procedure consent matches procedure scheduled  Relevant documents: relevant documents present and verified  Site marked: the operative site was not marked  Imaging studies: imaging studies available  Patient identity confirmed: verbally with patient  Time out: Immediately prior to procedure a \"time out\" was called to verify the correct patient, procedure, equipment, support staff and site/side marked as required.  Indications: vasopressors.    Anesthesia:  Local Anesthetic: lidocaine 1% without epinephrine  Anesthetic total: 3 " mL    Sedation:  Patient sedated: no    Preparation: skin prepped with chlorhexidine  Location details: right internal jugular  Patient position: flat  Catheter type: triple lumen  Ultrasound guidance: yes  Number of attempts: 1  Successful placement: yes  Post-procedure: line sutured and dressing applied  Assessment: blood return through all parts,  free fluid flow and placement verified by x-ray  Patient tolerance: Patient tolerated the procedure well with no immediate complications                  EKG Interpretation:      Interpreted by Chris Alonzo  Time reviewed: 1525  Symptoms at time of EKG: sepsis   Rhythm: normal sinus   Rate: normal  Axis: normal  Ectopy: none  Conduction: normal  ST Segments/ T Waves: non specific st/t changes  Q Waves: none  Comparison to prior: none available    Clinical Impression: normal EKG          Critical Care time:  was 50 minutes for this patient excluding procedures.        The patient has signs of Septic Shock as evidenced by:    1. Presence of Sepsis, AND  2. Lactic Acid level greater than or equal to 4    Time septic shock diagnosis confirmed = 1448 as this was the time when Lactate was resulted and the level was greater than or equal to 4      3 Hour Septic Shock Bundle Completion:  1. Initial Lactic Acid Result:   Recent Labs   Lab Test 04/21/19  1705 04/21/19  1440 12/01/12  0713   LACT 6.4* 6.2* 0.8     2. Blood Cultures before Antibiotics: Yes  3. Broad Spectrum Antibiotics Administered: Yes     Anti-infectives (From now, onward)    Start     Dose/Rate Route Frequency Ordered Stop    04/21/19 1457  vancomycin (VANCOCIN) 2,000 mg in sodium chloride 0.9 % 500 mL intermittent infusion     Note to Pharmacy:  DO NOT USE THIS FIELD FOR ADMIN INSTRUCTIONS; INFORMATION DOES NOT SHOW ON MAR. USE THE FIELD ABOVE MARKED ADMIN INSTRUCTIONS    2,000 mg  over 2 Hours Intravenous EVERY 12 HOURS 04/21/19 1452          4. 3660 ml LR ordered  122 kg    Severe Sepsis reassessment:  1.  Repeat Lactic Acid Level: 6.4  2. MAP>65 after initial IVF bolus, will continue to monitor fluid status and vital signs  I attest to having performed a repeat sepsis exam and assessment of perfusion at 1715 and the results demonstrate improved perfusion.           The patient has stroke symptoms:           ED Stroke specific documentation           NIHSS PDF          Protocol PDF     Patient last known well time: 1300  ED Provider first to bedside at: 1428  CT Results received at: 1442, CTA at 1500    tPA:   Not given due to alternative diagnosis apparent.      Endovascular Retrieval:  Not initiated due to absence of proximal vessel occlusion    National Institutes of Health Stroke Scale (Baseline)  Time Performed: 1428      Score    Level of consciousness: (0)   Alert, keenly responsive    LOC questions: (1)   Answers one question correctly    LOC commands: (0)   Performs both tasks correctly    Best gaze: (0)   Normal    Visual: (0)   No visual loss    Facial palsy: (0)   Normal symmetrical movements    Motor arm (left): 0 at baseline paretic    Motor arm (right): 0   Motor leg left At baseline paretic    Motor leg (right): (2)   Some effort against gravity    Limb ataxia: (0)   Absent    Sensory: (0)   Normal- no sensory loss    Best language: (1)   Mild to moderate aphasia    Dysarthria: (1)   Mild to moderate dysarthria    Extinction and inattention: (0)   No abnormality        Total Score:  5 (difficult to determine based on having a previous stroke)        Stroke Mimics were considered (including migraine headache, seizure disorder, hypoglycemia (or hyperglycemia), head or spinal trauma, CNS infection, Toxin ingestion and shock state (e.g. sepsis) .      No delay     Patient not having a stroke         Results for orders placed or performed during the hospital encounter of 04/21/19 (from the past 24 hour(s))   CT Head w/o Contrast    Narrative    HEAD CT AND CT ANGIOGRAM OF THE HEAD AND NECK WITHOUT AND  WITH  CONTRAST  4/21/2019 2:37 PM     COMPARISON: Head CT 7/31/2015 and head MRI 1/23/2013    HISTORY: Facial droop. Decreased mental status. Code Stroke.    TECHNIQUE:    HEAD CT: Axial CT images of the head from the skull base to the vertex  were acquired without IV contrast.  HEAD AND NECK CTA: Precontrast localizing scans were followed by CT  angiography with an injection of 70mL, Isovue 370 nonionic intravenous  contrast material with scans through the head and neck.  Images were  transferred to a separate 3-D workstation where multiplanar  reformations and 3-D images were created.  Estimates of carotid  stenoses are made relative to the distal internal carotid artery  diameters except as noted.      FINDINGS:   HEAD CT:  INTRACRANIAL CONTENTS: Extensive encephalomalacia again noted in the  right MCA territory with involvement of the posterolateral right  lentiform nucleus. Encephalomalacia also noted in the right thalamus.  Chronic lacunar infarct in the genu of the left internal capsule.  Proportional prominence of the ventricular system. These findings are  stable dating back to 2013.    VISUALIZED ORBITS/SINUSES/MASTOIDS: No significant orbital  abnormality. No significant paranasal sinus mucosal disease. No  significant middle ear or mastoid effusion.    OSSEOUS STRUCTURES/SOFT TISSUES: No significant abnormality.    HEAD CTA:  ANTERIOR CIRCULATION: Decreased arborization throughout the right MCA  territory. This correlates with the areas of encephalomalacia. The  anterior circulation is otherwise normal. Essentially fetal right  posterior cerebral artery.    POSTERIOR CIRCULATION: No significant stenosis or occlusion. Balanced  vertebral arteries supply a normal basilar artery.    ANEURYSM/VASCULAR MALFORMATION: None.    NECK CTA:  RIGHT CAROTID: No measurable stenosis in the right ICA based on NASCET  criteria.    LEFT CAROTID: No measurable stenosis in the left ICA based on NASCET  criteria.      VERTEBRAL ARTERIES: Dominant left and smaller right vertebral  arteries, patent in the neck and into the head.     AORTIC ARCH: Classic aortic arch anatomy with no significant stenosis  at the origin of the great vessels.      Impression    IMPRESSION:  HEAD CT:  1.  No mass, hemorrhage or acute stroke.  2.  Large area of right cerebral encephalomalacia largely in the right  MCA territory but also extending into the right thalamus. This pattern  correlates with the patient's essentially fetal right PCA origin.  3.  Chronic infarct in the genu of the left internal capsule.  4.  No change in the appearance of the brain dating back to 2013.    HEAD CTA:  1.  Decreased arborization throughout the right MCA territory  correlating with the areas of encephalomalacia.  2.  The remainder of the head CTA is normal.  3.  Essentially fetal origin of the right posterior cerebral artery.    NECK CTA:  1.  Normal neck CTA  for age.  2.  No significant stenosis as per NASCET criteria.    Nonenhanced head CT findings were discussed with Dr. Alonzo at 2:41 PM  hours and head and neck CTA findings were discussed with Dr. Alonzo at  3:00 PM hours on 4/21/2019.    Radiation dose for this scan was reduced using automated exposure  control, adjustment of the mA and/or kV according to patient size, or  iterative reconstruction technique.      GISSELLE LAURA MD   CBC with platelets differential   Result Value Ref Range    WBC 13.7 (H) 4.0 - 11.0 10e9/L    RBC Count 4.96 4.4 - 5.9 10e12/L    Hemoglobin 14.8 13.3 - 17.7 g/dL    Hematocrit 46.5 40.0 - 53.0 %    MCV 94 78 - 100 fl    MCH 29.8 26.5 - 33.0 pg    MCHC 31.8 31.5 - 36.5 g/dL    RDW 13.8 10.0 - 15.0 %    Platelet Count 283 150 - 450 10e9/L    Diff Method Automated Method     % Neutrophils 76.3 %    % Lymphocytes 20.4 %    % Monocytes 1.8 %    % Eosinophils 0.8 %    % Basophils 0.3 %    % Immature Granulocytes 0.4 %    Nucleated RBCs 0 0 /100    Absolute Neutrophil 10.5 (H) 1.6 - 8.3  10e9/L    Absolute Lymphocytes 2.8 0.8 - 5.3 10e9/L    Absolute Monocytes 0.3 0.0 - 1.3 10e9/L    Absolute Basophils 0.0 0.0 - 0.2 10e9/L    Abs Immature Granulocytes 0.1 0 - 0.4 10e9/L    Absolute Nucleated RBC 0.0    Basic metabolic panel   Result Value Ref Range    Sodium 138 133 - 144 mmol/L    Potassium 4.3 3.4 - 5.3 mmol/L    Chloride 103 94 - 109 mmol/L    Carbon Dioxide 19 (L) 20 - 32 mmol/L    Anion Gap 16 (H) 3 - 14 mmol/L    Glucose 236 (H) 70 - 99 mg/dL    Urea Nitrogen 28 7 - 30 mg/dL    Creatinine 1.02 0.66 - 1.25 mg/dL    GFR Estimate 70 >60 mL/min/[1.73_m2]    GFR Estimate If Black 81 >60 mL/min/[1.73_m2]    Calcium 9.6 8.5 - 10.1 mg/dL   INR   Result Value Ref Range    INR 1.24 (H) 0.86 - 1.14   Partial thromboplastin time   Result Value Ref Range    PTT 33 22 - 37 sec   Troponin I   Result Value Ref Range    Troponin I ES <0.015 0.000 - 0.045 ug/L   Lactic acid whole blood   Result Value Ref Range    Lactic Acid 6.2 (HH) 0.7 - 2.0 mmol/L   Hepatic panel   Result Value Ref Range    Bilirubin Direct 0.2 0.0 - 0.2 mg/dL    Bilirubin Total 0.6 0.2 - 1.3 mg/dL    Albumin 3.5 3.4 - 5.0 g/dL    Protein Total 8.0 6.8 - 8.8 g/dL    Alkaline Phosphatase 109 40 - 150 U/L    ALT 20 0 - 70 U/L    AST 22 0 - 45 U/L   CT Head Neck Angio w/o & w Contrast    Narrative    HEAD CT AND CT ANGIOGRAM OF THE HEAD AND NECK WITHOUT AND WITH  CONTRAST  4/21/2019 2:37 PM     COMPARISON: Head CT 7/31/2015 and head MRI 1/23/2013    HISTORY: Facial droop. Decreased mental status. Code Stroke.    TECHNIQUE:    HEAD CT: Axial CT images of the head from the skull base to the vertex  were acquired without IV contrast.  HEAD AND NECK CTA: Precontrast localizing scans were followed by CT  angiography with an injection of 70mL, Isovue 370 nonionic intravenous  contrast material with scans through the head and neck.  Images were  transferred to a separate 3-D workstation where multiplanar  reformations and 3-D images were created.   Estimates of carotid  stenoses are made relative to the distal internal carotid artery  diameters except as noted.      FINDINGS:   HEAD CT:  INTRACRANIAL CONTENTS: Extensive encephalomalacia again noted in the  right MCA territory with involvement of the posterolateral right  lentiform nucleus. Encephalomalacia also noted in the right thalamus.  Chronic lacunar infarct in the genu of the left internal capsule.  Proportional prominence of the ventricular system. These findings are  stable dating back to 2013.    VISUALIZED ORBITS/SINUSES/MASTOIDS: No significant orbital  abnormality. No significant paranasal sinus mucosal disease. No  significant middle ear or mastoid effusion.    OSSEOUS STRUCTURES/SOFT TISSUES: No significant abnormality.    HEAD CTA:  ANTERIOR CIRCULATION: Decreased arborization throughout the right MCA  territory. This correlates with the areas of encephalomalacia. The  anterior circulation is otherwise normal. Essentially fetal right  posterior cerebral artery.    POSTERIOR CIRCULATION: No significant stenosis or occlusion. Balanced  vertebral arteries supply a normal basilar artery.    ANEURYSM/VASCULAR MALFORMATION: None.    NECK CTA:  RIGHT CAROTID: No measurable stenosis in the right ICA based on NASCET  criteria.    LEFT CAROTID: No measurable stenosis in the left ICA based on NASCET  criteria.     VERTEBRAL ARTERIES: Dominant left and smaller right vertebral  arteries, patent in the neck and into the head.     AORTIC ARCH: Classic aortic arch anatomy with no significant stenosis  at the origin of the great vessels.      Impression    IMPRESSION:  HEAD CT:  1.  No mass, hemorrhage or acute stroke.  2.  Large area of right cerebral encephalomalacia largely in the right  MCA territory but also extending into the right thalamus. This pattern  correlates with the patient's essentially fetal right PCA origin.  3.  Chronic infarct in the genu of the left internal capsule.  4.  No change in the  appearance of the brain dating back to 2013.    HEAD CTA:  1.  Decreased arborization throughout the right MCA territory  correlating with the areas of encephalomalacia.  2.  The remainder of the head CTA is normal.  3.  Essentially fetal origin of the right posterior cerebral artery.    NECK CTA:  1.  Normal neck CTA  for age.  2.  No significant stenosis as per NASCET criteria.    Nonenhanced head CT findings were discussed with Dr. Alonzo at 2:41 PM  hours and head and neck CTA findings were discussed with Dr. Alonzo at  3:00 PM hours on 4/21/2019.    Radiation dose for this scan was reduced using automated exposure  control, adjustment of the mA and/or kV according to patient size, or  iterative reconstruction technique.      GISSELLE LAURA MD   POC US ECHO LIMITED    Impression    Error in type of ultrasound, this was for needle placement.    Ultrasound used for placement of central line for needle guidance.  Real-time central line placement was performed using ultrasound guidance with successful placement of right-sided internal jugular vein catheter.  Wire visualized in the middle of the internal jugular vein.       XR Chest Port 1 View    Narrative    CHEST PORTABLE ONE VIEW   4/21/2019 3:07 PM     COMPARISON: Two view chest x-ray 1/22/2013.    HISTORY: Hypotension, weakness.    FINDINGS: The cardiac silhouette, pulmonary vasculature, lungs and  pleural spaces are within normal limits.      Impression    IMPRESSION: Clear lungs.   Influenza A/B antigen   Result Value Ref Range    Influenza A/B Agn Specimen Nasopharyngeal     Influenza A Negative NEG^Negative    Influenza B Negative NEG^Negative   Chest  XR, 1 view portable    Narrative    CHEST PORTABLE ONE VIEW   4/21/2019 5:02 PM     HISTORY: Post central line placement.      Impression    IMPRESSION: Right internal jugular central line is in place with the  tip projecting in the mid superior vena cava. There may be linear  atelectasis overlapping the right  and left hemidiaphragms. The lungs  are otherwise grossly clear.    Basic metabolic panel   Result Value Ref Range    Sodium 140 133 - 144 mmol/L    Potassium 4.4 3.4 - 5.3 mmol/L    Chloride 108 94 - 109 mmol/L    Carbon Dioxide 20 20 - 32 mmol/L    Anion Gap 12 3 - 14 mmol/L    Glucose 163 (H) 70 - 99 mg/dL    Urea Nitrogen 28 7 - 30 mg/dL    Creatinine 1.14 0.66 - 1.25 mg/dL    GFR Estimate 61 >60 mL/min/[1.73_m2]    GFR Estimate If Black 71 >60 mL/min/[1.73_m2]    Calcium 8.4 (L) 8.5 - 10.1 mg/dL   Lactic acid whole blood   Result Value Ref Range    Lactic Acid 6.4 (HH) 0.7 - 2.0 mmol/L       Medications   0.9% sodium chloride BOLUS (has no administration in time range)   vancomycin (VANCOCIN) 2,000 mg in sodium chloride 0.9 % 500 mL intermittent infusion (2,000 mg Intravenous New Bag 4/21/19 1536)   acetaminophen (TYLENOL) tablet 500 mg (500 mg Oral Not Given 4/21/19 1719)   iopamidol (ISOVUE-370) solution 500 mL (70 mLs Intravenous Given 4/21/19 1439)   sodium chloride (PF) 0.9% PF flush 3 mL (3 mLs Intravenous Given 4/21/19 1438)   sodium chloride 0.9 % bag 100mL for CT scan flush use (100 mLs Intravenous Given 4/21/19 1439)   piperacillin-tazobactam (ZOSYN) intermittent infusion 4.5 g (0 g Intravenous Stopped 4/21/19 1608)   0.9% sodium chloride BOLUS (0 mLs Intravenous Stopped 4/21/19 1608)       Assessments & Plan (with Medical Decision Making)  78-year-old male presented as a code stroke but turned out to have septic shock.  Blood pressure is low patient was febrile and having voluminous diarrhea.  Sepsis protocol initiated.  Initial lactate 6.2.  3.3 L of lactated Ringer's ordered.  Repeat lactate actually elevated to 6.4 despite vancomycin and Zosyn and IV fluid resuscitation.    I discussed the case with Dr. Dewitt who saw the patient in the emergency department and requested a central line.  That was placed by me.  Please see the procedure note above.  Patient's mean arterial pressure was greater than  65 after placing a central line and therefore pressors were not started in the emergency department.  I suspect his source of sepsis is related to GI illness given the voluminous diarrhea.  No recent antibiotics to suggest C. difficile colitis.  His abdomen is soft and therefore CT scan not performed.  Cast catheter placed for monitoring I's and O's.  Urinalysis is pending at the time of this dictation.       I have reviewed the nursing notes.    I have reviewed the findings, diagnosis, plan and need for follow up with the patient.         Medication List      There are no discharge medications for this visit.         Final diagnoses:   Sepsis, due to unspecified organism (H)     This document serves as a record of services personally performed by Red Alonzo MD. It was created on their behalf by Arely Isaacs, a trained medical scribe. The creation of this record is based on the provider's personal observations and the statements of the patient. This document has been checked and approved by the attending provider.    Note: Chart documentation done in part with Dragon Voice Recognition software. Although reviewed after completion, some word and grammatical errors may remain.    4/21/2019   [facility] EMERGENCY DEPARTMENT     Chris Alonzo MD  04/21/19 1737       Chris Alonzo MD  04/21/19 1730

## 2019-04-21 NOTE — LETTER
Transition Communication Hand-off for Care Transitions to Next Level of Care Provider    Name: Derek Aragon  : 1940  MRN #: [id number]  Primary Care Provider: Rosalino Melton  Primary Care MD Name: Dr. Rosalino Melton   Primary Clinic: [address] [facility] [address]  Primary Care Clinic Name: [facility]   Reason for Hospitalization:  Sepsis, due to unspecified organism (H) [A41.9]  Admit Date/Time: 2019  2:28 PM  Discharge Date: 19  Payor Source: Payor: MEDICA / Plan: [employer] / Product Type: HMO /     Readmission Assessment Measure (ERNESTO) Risk Score/category: Average           Reason for Communication Hand-off Referral: Fragility  Other Going to TCU     Discharge Plan:  Discharge Plan:      Most Recent Value   Disposition Comments  Patient has been accepted for TCU placement at [facility].  They can accommodate a weekend admission.             Concern for non-adherence with plan of care:   Y/N no   Discharge Needs Assessment:  Needs      Most Recent Value   Equipment Currently Used at Home  wheelchair, power, wheelchair, manual, transfer board, shower chair, commode, grab bar, toilet, grab bar, tub/shower, lift device, hospital bed   # of Referrals Placed by [facility]  Internal Clinic Care Coordination, Post Acute Facilities   Skilled Nursing Facility  [facility] [phone], Fax: [phone]   PAS Number  -- [[id number] ]          Already enrolled in Tele-monitoring program and name of program:  no   Follow-up specialty is recommended: No    Follow-up plan:    Future Appointments   Date Time Provider Department Center   2019  2:50 PM Rosalino Melton MD [facility] MEDIC       Any outstanding tests or procedures:    Procedures     Future Labs/Procedures    CPAP for stable sleep apnea with home equipment settings           Radiology & Cardiology Orders     Future Labs/Procedures Complete By Expires    Zio Patch Holter Adult Pediatric Greater than 48  hrs  As directed 6/16/2019        Referrals     Future Labs/Procedures    Physical Therapy Adult Consult     Comments:    Evaluate and treat as clinically indicated.    Reason:  Septic shock, weakness, old stroke            Key Recommendations:  TCU placement     RAYSA Patel   [phone]    AVS/Discharge Summary is the source of truth; this is a helpful guide for improved communication of patient story

## 2019-04-22 PROBLEM — D72.829 LEUKOCYTOSIS: Status: ACTIVE | Noted: 2019-01-01

## 2019-04-22 PROBLEM — R10.84 ABDOMINAL PAIN, GENERALIZED: Status: ACTIVE | Noted: 2019-01-01

## 2019-04-22 PROBLEM — N17.9 ACUTE KIDNEY INJURY (H): Status: ACTIVE | Noted: 2019-01-01

## 2019-04-22 NOTE — CONSULTS
[facility] Surgery Consultation    Derek Aragon MRN# [id number]   Age: 78 year old YOB: 1940     Date of Admission:  4/21/2019    Reason for consult: Abdominal pain  R/O bowel ischemia       Requesting physician: Elena Palomares MD       Level of consult: Consult, follow and place orders           Assessment and Plan:   Assessment:   Principal Problem:    Septic shock (H)    Obesity, morbid, BMI 40.0-49.9 (H)    Type 2 diabetes mellitus with complication, with long-term current use of insulin (H)    Diarrhea    Aphasia as late effect of stroke    Acute kidney injury (H)    Leukocytosis    Abdominal pain, generalized      Plan:   Patient abdominal pain, leukocytosis, hematochezia, diarrhea likely due to colonic ischemia secondary to low-flow state.   Pt may also be experiencing reperfusion injury now that he's normotensive.  No signs of acute abdomen.  Recommends CT abd/pel to r/o other etiology.  Recommends r/o C diff.  Cont rehydration to help reperfusion of the colonic muscosa.  Will see patient in AM. Spoke with patient's wife at bedside regarding above.  I have no plans for the OR at this point unless patient has an acute abdomen.                 Chief Complaint:   Abdominal pain  Septic shock - ?source  Colonic ischemia     History is obtained from the patient         History of Present Illness:   This patient is a 78 year old  male with a significant past medical history of chronic kidney disease, coronary artery disease, diabetes, hypertension, morbid obesity and stroke who presents with the following condition requiring a hospital admission:    Presented to ED due to unresponsive during Easter lunch.  Per wife at bedside, pt been fine the up until then.  Pt to have elevated BS at home and was unresponsive.  Thus brought to hospital where he was noted to be hypotensive, pale, diaphoretic, with a right facial droop.  Patient worked up for acute CVA; which was negative.  Pt  noted to have previous right sided CVA with residual left sided residual deficit.  Patient was resusciated in the ED with 6Ls of IVF, placement of central line, and abx.  Since, patient's BP is now normotensive.        I was asked to see the patient today due to increased abdominal pain, distention and bloody diarrhea.  Pt is alert to voice and was able to verbalized pain due the abdominal exam, but not much more than that.  Most history were given by his wife at bedside.  She stated pt was normal prior to lunch; he did complained of generalized abdominal pain prior to becoming unresponsive.  Never had C diff; hasn't been on abx the past few months.  No blood thinners at home.  No fevers, no chills at home.            Past Medical History:     Past Medical History:   Diagnosis Date     Anxiety state, unspecified     Mixed anxiety/depression     Diabetic eye exam (H) 10/10/12     Impotence of organic origin      Obesity, morbid, BMI 40.0-49.9 (H) 10/27/2015     Obesity, unspecified      Pure hypercholesterolemia      Sleep disturbance, unspecified     Sleep apnea     Type II or unspecified type diabetes mellitus without mention of complication, not stated as uncontrolled      Unspecified cerebral artery occlusion with cerebral infarction      Unspecified essential hypertension              Past Surgical History:     Past Surgical History:   Procedure Laterality Date     C OPEN RX FEMUR FX+INTRAMED EDE Left     2016     COLONOSCOPY  2012    Procedure: COLONOSCOPY;  colonoscopy;  Surgeon: Chris Crockett MD;  Location:  GI     ENT SURGERY      Parotid gland tumor removal     HC REMOVAL GALLBLADDER      Cholecystectomy             Social History:     Social History     Tobacco Use     Smoking status: Former Smoker     Packs/day: 1.00     Years: 20.00     Pack years: 20.00     Types: Cigarettes     Last attempt to quit: 1985     Years since quittin.3     Smokeless tobacco: Never Used   Substance  Use Topics     Alcohol use: No             Family History:     Family History   Problem Relation Age of Onset     Cardiovascular Mother         MI age 75 yrs     Diabetes Mother      Obesity Mother      Cerebrovascular Disease Father         anneurysm age 52      Obesity Maternal Grandmother      Psychotic Disorder Maternal Grandmother         suicide     Psychotic Disorder Maternal Grandfather         suicide     Lipids Sister      Family history reviewed and updated in [facility]          Immunizations:   Immunization status is unknown          Allergies:   This patient is allergic to is allergic to no known drug allergies.          Medications:     Current Facility-Administered Medications   Medication     acetaminophen (TYLENOL) tablet 500 mg     glucose gel 15-30 g    Or     dextrose 50 % injection 25-50 mL    Or     glucagon injection 1 mg     enoxaparin (LOVENOX) injection 40 mg     heparin lock flush 10 UNIT/ML injection 5-10 mL     heparin lock flush 10 UNIT/ML injection 5-10 mL     HYDROmorphone (PF) (DILAUDID) injection 0.2 mg     insulin 1 unit/mL in saline (NovoLIN, HumuLIN Regular) drip - ADULT IV Infusion     iohexol (OMNIPAQUE) solution 50 mL     lidocaine (LMX4) kit     lidocaine 1 % 0.1-1 mL     NaCl 0.45 % 1,000 mL with sodium bicarbonate 50 mEq/L infusion     naloxone (NARCAN) injection 0.1-0.4 mg     norepinephrine (LEVOPHED) 16 mg in D5W 250 mL infusion     ondansetron (ZOFRAN-ODT) ODT tab 4 mg    Or     ondansetron (ZOFRAN) injection 4 mg     piperacillin-tazobactam (ZOSYN) intermittent infusion 4.5 g     prochlorperazine (COMPAZINE) injection 5 mg    Or     prochlorperazine (COMPAZINE) tablet 5 mg    Or     prochlorperazine (COMPAZINE) Suppository 12.5 mg     sodium chloride (PF) 0.9% PF flush 10-20 mL     sodium chloride (PF) 0.9% PF flush 3 mL     sodium chloride 0.9% infusion     vancomycin (VANCOCIN) 2,000 mg in sodium chloride 0.9 % 500 mL intermittent infusion             Review of  Systems:   Review of systems is limited by patient factors - abnormal mental status          Physical Exam:     Vitals were reviewed  Patient Vitals for the past 8 hrs:   BP Temp Temp src Pulse Heart Rate Resp SpO2 Weight   04/22/19 1000 117/56 -- -- 99 100 22 91 % --   04/22/19 0900 147/82 -- -- 109 115 14 91 % --   04/22/19 0815 -- 99.6  F (37.6  C) Oral -- -- -- -- --   04/22/19 0800 156/90 -- -- 115 108 (!) 50 95 % --   04/22/19 0700 -- -- -- 102 107 13 -- --   04/22/19 0604 -- -- -- -- -- -- -- 123.8 kg (272 lb 14.9 oz)   04/22/19 0600 126/69 -- -- 100 105 -- 95 % --   04/22/19 0500 118/56 -- -- 93 94 -- 95 % --   04/22/19 0415 -- 98.1  F (36.7  C) Oral -- -- -- -- --   04/22/19 0400 114/56 -- -- 94 94 10 94 % --     Constitutional:   bipap in place, alert to voice and pain and morbidly obese     Neck:   skin normal and no stridor     Lungs:   bipap in place     Abdomen:   Morbid obesity obese, mildly distended, pain at B/L lower abdomen on deep palpation; neg rebound, neg acute abdomen     Genitounirinary:   Melo in place - dark urine in melo with particulate     Musculoskeletal:   Warm, pink, 1+ pitting B/L.  Palpable PT/DP     Neurologic:   History of CVA with residual deficit; pt alert to voice and pain     Skin:   no bruising or bleeding and normal skin color, texture, turgor             Data:     Lab Results   Component Value Date    WBC 27.0 (H) 04/22/2019    WBC 13.7 (H) 04/21/2019    WBC 7.5 07/31/2015    HGB 11.8 (L) 04/22/2019    HGB 14.8 04/21/2019    HGB 12.5 (L) 07/31/2015    HCT 37.4 (L) 04/22/2019    HCT 46.5 04/21/2019    HCT 38.9 (L) 07/31/2015     04/22/2019     04/21/2019     07/31/2015     04/22/2019     04/21/2019     04/21/2019    POTASSIUM 4.7 04/22/2019    POTASSIUM 4.7 04/21/2019    POTASSIUM 4.4 04/21/2019    CHLORIDE 108 04/22/2019    CHLORIDE 109 04/21/2019    CHLORIDE 108 04/21/2019    CO2 24 04/22/2019    CO2 21 04/21/2019    CO2 20  04/21/2019    BUN 30 04/22/2019    BUN 28 04/21/2019    BUN 28 04/21/2019    CR 1.52 (H) 04/22/2019    CR 1.14 04/21/2019    CR 1.02 04/21/2019     (H) 04/22/2019     (H) 04/21/2019     (H) 04/21/2019    NTBNPI 538 01/22/2013    TROPI <0.015 04/21/2019    TROPI  07/31/2015     <0.015  The 99th percentile for upper reference range is 0.045 ug/L.  Troponin values in   the range of 0.045 - 0.120 ug/L may be associated with risks of adverse   clinical events.      TROPI <0.012 01/22/2013    AST 22 04/22/2019    AST 22 04/21/2019    AST 12 05/22/2017    ALT 15 04/22/2019    ALT 20 04/21/2019    ALT 12 05/22/2017    ALKPHOS 68 04/22/2019    ALKPHOS 109 04/21/2019    ALKPHOS 110 05/22/2017    BILITOTAL 0.6 04/22/2019    BILITOTAL 0.6 04/21/2019    BILITOTAL 0.3 05/22/2017    INR 1.24 (H) 04/21/2019    INR 1.09 12/01/2012    INR 0.99 02/25/2012     All imaging studies reviewed by me.     Attestation:  Total time: 60 minutes    Homero Carpenter MD

## 2019-04-22 NOTE — PROGRESS NOTES
Pt returned from CT via cart.  Transferred back to bed via mechanical lift and 3 staff members.  Pt tolerated CT and transfer.  Settled pt in room.  Continues to have intermittent nausea but declines antiemetic at this time.  NPO.  Fan provided at bedside.  Pt's home CPAP on and pt resting.

## 2019-04-22 NOTE — SIGNIFICANT EVENT
Significant Event Note    Time of event: 7:18 PM April 21, 2019    Description of event:  Admitted to the ICU.  Blood pressure improved after fluid resuscitation and has not required initiation of vasopressors yet.  However, has a moderately severe metabolic acidosis with anion gap that has previously normalized and may be due to stool bicarbonate losses from copious diarrhea.  He developed hypothermia after initial fever in the ER for which rewarming with a bear hugger was initiated and his temperature has now improved.  CVP is currently 7 and 9 and blood pressure is normal to elevated.    Concern for possible bowel ischemia with elevated lactate, diarrhea and heme positive stool persists.    Plan:  Switch IV fluids to half-normal saline with sodium bicarbonate and follow blood gases closely, could also consider assisted ventilation with BiPAP although this may not produce respiratory alkalosis to compensate for the metabolic acidosis    Continue close hemodynamic monitoring including CVP monitoring, low threshold to start vasopressors with norepinephrine if mean arterial pressure falls less than 65    Checking procalcitonin and rechecking lactic acid at this time along with electrolytes, would follow serial lactic acid levels until improving    Discontinue bear hugger    Follow serial abdominal exams, consider radiographic imaging and/or surgical consultation if there are increasing concerns for bowel ischemia    Discussed with: bedside nurse    Total critical care time today so far 45 minutes excluding time spent for initial admission history and physical.  Critical care time has been spent reassessing clinical status, managing fluid resuscitation and acid-base status, and continuing to consider initiation of vasopressor therapy.    Celso Dewitt MD

## 2019-04-22 NOTE — CONSULTS
CARE TRANSITION SOCIAL WORK INITIAL ASSESSMENT:      Met with: Family (spoke with wife, Mattie, over the phone today). Patient not able to participate in discharge planning at this time.     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 Rosalino Melton   Contact information and PCP information verified: Yes      ASSESSMENT  Cognitive Status: awake.                      Description of Support System: Supportive, Involved   Who is your support system?: Wife, PCA   Support Assessment: Adequate family and caregiver support, Adequate social supports   Insurance Concerns: No Insurance issues identified  Living Arrangements: house        This writer spoke with patient's wife over the phone and introduced self and role.  Patient is medically not stable enough to be involved in discharge planning at this time.  Discussed discharge planning and medicare guidelines in regards to home care and SNF benefits.     Wife stated that she thinks patient will need to go to a TCU.  Discharge may not be for 4-7 days, depending how patient does here in the hospital. She and patient's PCA are patient's primary caregivers at home.  They use a lift at home for patient to  transfer.  PCA is through [facility] PCA services and they are there 11 hours per day with patient.  Patient also has an RN and home care aide from [facility].    Patient's  through his [employer] [facility]/ partners is Jossy Sebastian #[phone]. She is aware that patient has been hospitalized.      Patient was provided with Medicare certified nursing home list. Wife stated that patient's first choice would be: [facility] (Main Phone: [phone] Admissions Phone: [phone] Fax: [phone]), and second choice would be [facility]  (Phone: [phone] Fax: [phone]).  Referrals being sent.          PLAN    Care Transitions will continue to assess discharge planning needs.  TCU referrals sent. Will discuss discharge planning with patient when more medically stable.        RAYSA Patel

## 2019-04-22 NOTE — PROVIDER NOTIFICATION
DATE: 4/22/2019    TIME OF RECEIPT FROM LAB:  1605  LAB TEST:  Procalcitonin  LAB VALUE:  21.77  RESULTS GIVEN WITH READ-BACK TO (PROVIDER):  Dr Palomares  TIME LAB VALUE REPORTED TO PROVIDER:   1030  NEW ORDERS: No

## 2019-04-22 NOTE — PROGRESS NOTES
[facility]    Medicine Progress Note - Hospitalist Service       Date of Admission:  4/21/2019  Assessment & Plan   Principal Problem:    Septic shock (H)    Assessment: Characterized initially by fever, leukocytosis, lactic acid of 6.2, and severe hypotension and required numerous fluid bolusing but did not end up requiring vasopressor support capital for blood pressure stability occurred.  Patient subsequently has developed signs of acute kidney injury and worsening abdominal pain associated with nausea and diarrhea which may be primary source of infection versus ischemic bowel injury secondary to sepsis.  Leukocytosis has worsened from 13,000 up to 27,000 despite initial intervention.  Initial procalcitonin was 0.031.  Enteric stool testing was negative.  No lactic was drawn this morning - most recent was 2.8 last evening.  Patient is more alert and blood pressures stabilizing but has now developed worsening tachycardia, temperatures in the 99 range overnight and this morning.      Plan: We will continue with Zosyn and vancomycin for antibiotic coverage, however if acute kidney injury continues to worsen may need to consider transitioning secondary to increased risk of nephrotoxicity with this combination.  Will see if the lactic acid and procalcitonin can be added onto the morning lab work already performed and consider further fluid resuscitation if needed.  Did discuss the patient's worsening abdominal symptoms and exam with Dr. Carpenter, general surgeon on call, and will perform a CT scan of the abdomen/pelvis with oral contrast only given patient's worsening renal function and she will evaluate patient this morning.  Dr. Carpenter is recommending C diff testing as well given the worsening abdominal pain and symptoms and this will be ordered.  Will continue with IV fluids at 150 ml/hr, monitor urinary output and blood pressures closely, recheck BMP, lactic acid and hemoglobin at 1200.   "Family and patient are aware he is still critically ill and the source of infection remains unknown at this time.      Active Problems:    Abdominal pain, generalized    Assessment:  Was not present at time of admission but has developed overnight and now associated with nausea and one episode of vomiting this morning.  Patient denies any pain apart from when \"you all push on it\" but states the pain is sharp in nature.  Nursing is noting increased abdominal distension and firmness this morning compared to yesterday. On exam, patient is distended and slightly firm with pain present diffusely but much worse in the right and left lower abdomen than the upper abdomen.  Patient does have sepsis of unclear etiology but also had a prolonged period of significant hypotension and is at risk for ischemic bowel.      Plan: Discussed with Dr. Carpenter as above, will proceed with abdomen/pelvis CT with oral contrast only, send off C. Diff testing and general surgery will evaluate today.        Diarrhea    Assessment: Noted in the emergency room and unclear if it was present prior to admission but has been ongoing since admission. Enteric stool testing is negative, positive for occult blood with hemoglobin dropping 3 grams since admission.  Now associated with worsening abdominal pain, nausea and vomiting as above.     Plan: Proceed with plan as above      Acute kidney injury (H)    Assessment: not present at time of admission but developing in the past 16 hours and likely secondary to the hypotension and septic shock as above.  Patient's urinary output overnight has decreased (175 ml in the past 8 hours) and creatinine 1.52 this morning.  Potassium is normal.        Plan: We will continue with current IV fluids at 150 cc an hour, nursing will monitor urinary output closely and inform if it remains less than 30 cc an hour.  We will recheck BMP at 1200 for further evaluation of kidney function, creatinine clearance, and potassium and " proceed with plan as above for ongoing monitoring and treatment of septic shock      Leukocytosis    Assessment: present at time of admission and thought secondary to septic shock as above.  Has now increased from 13.7 up to 27.0 in the past 16 hours despite initial IV fluid resuscitation and IV antibiotics     Plan: Will continue with plan as above, continue Zosyn and Vanco antibiotics, recheck procalcitonin off AM labs and perform CBC every 12 hours for ongoing monitoring      Metabolic acidosis, increased anion gap    Assessment: Present initially in part secondary to lactic acidosis.  Has subsequently resolved with fluid resuscitation and remains resolved this morning    Plan: We will continue with bicarbonate containing IV fluids this morning but reevaluate BMP this afternoon and potentially transition away from bicarb if ongoing stability is present.  Repeat VBG tomorrow morning      Unresponsive episode    Assessment: Occurring prior to this hospitalization, thought secondary to septic shock and severe  hypotension as above.  Patient is now alert and mentating well     Plan: Continue with treatment as above and monitor closely for neurological stability      Anemia    Assessment:   Hemoglobin has decreased from 14 down to 11 since time of admission.  Patient has received numerous fluid boluses which may be causing partial hemodilution effect however occult stool was positive for blood and there is concern for possible GI loss as well    Plan:   We will perform hemoglobin every 6 hours for further monitoring of blood loss and proceed with evaluation of GI symptoms as above.  If hemoglobin continues to trend downward, would discontinue Lovenox and initiate SCDs for DVT prophylaxis.      Type 2 diabetes mellitus with complication, with long-term current use of insulin (H)    Assessment: Normally on Lantus 15 units in the AM and 25 units at bedtime in addition to metformin with diabetes well controlled with  hemoglobin A1C 6.2 on onset of this hospitalization.  Home regimen has been held and patient has been on an insulin drip with blood sugars well controlled overnight.  Now with acute kidney injury, onset of worsening abdominal pain and emesis as above    Plan: Will continue with insulin drip for now and titrate as needed.  Monitor renal function closely.        Hemiparesis of left nondominant side as late effect of cerebral infarction (H)    Assessment: Chronic and stable    Plan: Continue supportive cares      Peripheral vascular disease (H)    Assessment:  Previous history    Plan: Continue to hold chronic medications including aspirin secondary to concerns of blood loss as above      History of CVA (cerebrovascular accident) wit left hemiparesis    Assessment: Previous history, no evidence of acute focal neurological deficits    Plan: Continue to hold aspirin secondary to concern for bleed as above and monitor closely      Essential hypertension with goal blood pressure less than 140/90    Assessment: Present at baseline, however patient was severely hypotensive on initial presentation with persistent hypotension despite multiple fluid boluses.  Home regimen has been held and blood pressures this morning are in the 1  range systolic    Plan: continue to hold home regimen and monitor blood pressure closely.  If blood pressures become consistently hypertensive would consider reinitiation of some of the patient's home regimen      Obesity, morbid, BMI 40.0-49.9 (H)    Assessment: Chronic and stable    Plan: No acute intervention needed      Encephalomalacia with old cerebral infarction (H) right MCA territory    Assessment: Chronic, does increase the risk of possible seizure activity    Plan: Continue to monitor for neurological stability      Aphasia as late effect of stroke    Assessment:  Chronic and mild with patient able to answer some questions easily and other questions he is unable to answer at all     Plan: Continue supportive care      Wheelchair bound    Assessment: Chronic following his previous CVA,  family uses a lift at home    Plan: We will continue with Arnulfo  lift at this time, will consider physical therapy evaluation as patient clinically improved      MIO (obstructive sleep apnea)    Assessment: On CPAP    Plan: Continue with home device during his hospitalization if family can bring it in      Diet: NPO for Medical/Clinical Reasons Except for: No Exceptions    DVT Prophylaxis: Enoxaparin (Lovenox) SQ  Cast Catheter: in place, indication: Strict 1-2 Hour I&O  Code Status: DNR/DNI      Disposition Plan   Expected discharge: 4 - 7 days, recommended to Unknown at this time once SIRS/Sepsis treated.  Entered: Elena Palomares MD 04/22/2019, 7:53 AM       The patient's care was discussed with the Bedside Nurse, Patient and Patient's Family.    Elena Palomares MD  Hospitalist Service  [facility]    ______________________________________________________________________    Interval History   Patient's blood pressures did stabilize following ongoing aggressive fluid resuscitation and no vasopressor initiation was needed.  Patient has been complaining of increasing nausea with emesis noted and there is concern for increased abdominal distention and pain on exam today compared to time of admission.  Patient has ongoing profuse diarrhea with enteric testing negative.  Patient remains slightly tachycardic but other vital signs are stable.  Patient denies any other new symptoms apart from abdominal pain.      Data reviewed today: I reviewed all medications, new labs and imaging results over the last 24 hours.    Physical Exam   Vital Signs: Temp: 98.1  F (36.7  C) Temp src: Oral BP: 126/69 Pulse: 102 Heart Rate: 107 Resp: 13 SpO2: 95 % O2 Device: None (Room air)    Weight: 272 lbs 14.87 oz  Constitutional: Awake, alert, cooperative with examination and answers  questions when he is able to however is a phasic at times.  He does not appear in acute distress at rest.  Ongoing hemiparesis of the left extremity is noted  Respiratory: No increased work of breathing, good air exchange, clear to auscultation bilaterally, no crackles or wheezing  Cardiovascular: Sinus tachycardia with heart rate in the low 100 range  GI: Bowel sounds are present, abdomen is obese and distended and slightly firm on palpation with patient having significant tenderness on palpation of the right and left lower quadrant and a lesser extent in the upper quadrants.  There is no guarding or rebound tenderness noted  Musculoskeletal: Patient has trace to 1+ nonpitting edema in bilateral lower extremities  Neurologic: Awake, alert, oriented to person, place, and year.  Oriented overall the situation.  Does have ongoing left-sided hemiparesis following his previous CVA as well as ongoing intermittent aphagia which limits his ability to answer questions    Data   Recent Labs   Lab 04/22/19  0615 04/21/19  1930 04/21/19  1705 04/21/19  1440   WBC 27.0*  --   --  13.7*   HGB 11.8*  --   --  14.8   MCV 93  --   --  94     --   --  283   INR  --   --   --  1.24*    141 140 138   POTASSIUM 4.7 4.7 4.4 4.3   CHLORIDE 108 109 108 103   CO2 24 21 20 19*   BUN 30  --  28 28   CR 1.52*  --  1.14 1.02   ANIONGAP 8 11 12 16*   MAKENNA 8.1*  --  8.4* 9.6   *  --  163* 236*   ALBUMIN 2.6*  --   --  3.5   PROTTOTAL 6.0*  --   --  8.0   BILITOTAL 0.6  --   --  0.6   ALKPHOS 68  --   --  109   ALT 15  --   --  20   AST 22  --   --  22   TROPI  --   --   --  <0.015     Recent Results (from the past 24 hour(s))   CT Head w/o Contrast    Narrative    HEAD CT AND CT ANGIOGRAM OF THE HEAD AND NECK WITHOUT AND WITH  CONTRAST  4/21/2019 2:37 PM     COMPARISON: Head CT 7/31/2015 and head MRI 1/23/2013    HISTORY: Facial droop. Decreased mental status. Code Stroke.    TECHNIQUE:    HEAD CT: Axial CT images of the head  from the skull base to the vertex  were acquired without IV contrast.  HEAD AND NECK CTA: Precontrast localizing scans were followed by CT  angiography with an injection of 70mL, Isovue 370 nonionic intravenous  contrast material with scans through the head and neck.  Images were  transferred to a separate 3-D workstation where multiplanar  reformations and 3-D images were created.  Estimates of carotid  stenoses are made relative to the distal internal carotid artery  diameters except as noted.      FINDINGS:   HEAD CT:  INTRACRANIAL CONTENTS: Extensive encephalomalacia again noted in the  right MCA territory with involvement of the posterolateral right  lentiform nucleus. Encephalomalacia also noted in the right thalamus.  Chronic lacunar infarct in the genu of the left internal capsule.  Proportional prominence of the ventricular system. These findings are  stable dating back to 2013.    VISUALIZED ORBITS/SINUSES/MASTOIDS: No significant orbital  abnormality. No significant paranasal sinus mucosal disease. No  significant middle ear or mastoid effusion.    OSSEOUS STRUCTURES/SOFT TISSUES: No significant abnormality.    HEAD CTA:  ANTERIOR CIRCULATION: Decreased arborization throughout the right MCA  territory. This correlates with the areas of encephalomalacia. The  anterior circulation is otherwise normal. Essentially fetal right  posterior cerebral artery.    POSTERIOR CIRCULATION: No significant stenosis or occlusion. Balanced  vertebral arteries supply a normal basilar artery.    ANEURYSM/VASCULAR MALFORMATION: None.    NECK CTA:  RIGHT CAROTID: No measurable stenosis in the right ICA based on NASCET  criteria.    LEFT CAROTID: No measurable stenosis in the left ICA based on NASCET  criteria.     VERTEBRAL ARTERIES: Dominant left and smaller right vertebral  arteries, patent in the neck and into the head.     AORTIC ARCH: Classic aortic arch anatomy with no significant stenosis  at the origin of the great  vessels.      Impression    IMPRESSION:  HEAD CT:  1.  No mass, hemorrhage or acute stroke.  2.  Large area of right cerebral encephalomalacia largely in the right  MCA territory but also extending into the right thalamus. This pattern  correlates with the patient's essentially fetal right PCA origin.  3.  Chronic infarct in the genu of the left internal capsule.  4.  No change in the appearance of the brain dating back to 2013.    HEAD CTA:  1.  Decreased arborization throughout the right MCA territory  correlating with the areas of encephalomalacia.  2.  The remainder of the head CTA is normal.  3.  Essentially fetal origin of the right posterior cerebral artery.    NECK CTA:  1.  Normal neck CTA  for age.  2.  No significant stenosis as per NASCET criteria.    Nonenhanced head CT findings were discussed with Dr. Alonzo at 2:41 PM  hours and head and neck CTA findings were discussed with Dr. Alonzo at  3:00 PM hours on 4/21/2019.    Radiation dose for this scan was reduced using automated exposure  control, adjustment of the mA and/or kV according to patient size, or  iterative reconstruction technique.      GISSELLE LAURA MD   CT Head Neck Angio w/o & w Contrast    Narrative    HEAD CT AND CT ANGIOGRAM OF THE HEAD AND NECK WITHOUT AND WITH  CONTRAST  4/21/2019 2:37 PM     COMPARISON: Head CT 7/31/2015 and head MRI 1/23/2013    HISTORY: Facial droop. Decreased mental status. Code Stroke.    TECHNIQUE:    HEAD CT: Axial CT images of the head from the skull base to the vertex  were acquired without IV contrast.  HEAD AND NECK CTA: Precontrast localizing scans were followed by CT  angiography with an injection of 70mL, Isovue 370 nonionic intravenous  contrast material with scans through the head and neck.  Images were  transferred to a separate 3-D workstation where multiplanar  reformations and 3-D images were created.  Estimates of carotid  stenoses are made relative to the distal internal carotid artery  diameters  except as noted.      FINDINGS:   HEAD CT:  INTRACRANIAL CONTENTS: Extensive encephalomalacia again noted in the  right MCA territory with involvement of the posterolateral right  lentiform nucleus. Encephalomalacia also noted in the right thalamus.  Chronic lacunar infarct in the genu of the left internal capsule.  Proportional prominence of the ventricular system. These findings are  stable dating back to 2013.    VISUALIZED ORBITS/SINUSES/MASTOIDS: No significant orbital  abnormality. No significant paranasal sinus mucosal disease. No  significant middle ear or mastoid effusion.    OSSEOUS STRUCTURES/SOFT TISSUES: No significant abnormality.    HEAD CTA:  ANTERIOR CIRCULATION: Decreased arborization throughout the right MCA  territory. This correlates with the areas of encephalomalacia. The  anterior circulation is otherwise normal. Essentially fetal right  posterior cerebral artery.    POSTERIOR CIRCULATION: No significant stenosis or occlusion. Balanced  vertebral arteries supply a normal basilar artery.    ANEURYSM/VASCULAR MALFORMATION: None.    NECK CTA:  RIGHT CAROTID: No measurable stenosis in the right ICA based on NASCET  criteria.    LEFT CAROTID: No measurable stenosis in the left ICA based on NASCET  criteria.     VERTEBRAL ARTERIES: Dominant left and smaller right vertebral  arteries, patent in the neck and into the head.     AORTIC ARCH: Classic aortic arch anatomy with no significant stenosis  at the origin of the great vessels.      Impression    IMPRESSION:  HEAD CT:  1.  No mass, hemorrhage or acute stroke.  2.  Large area of right cerebral encephalomalacia largely in the right  MCA territory but also extending into the right thalamus. This pattern  correlates with the patient's essentially fetal right PCA origin.  3.  Chronic infarct in the genu of the left internal capsule.  4.  No change in the appearance of the brain dating back to 2013.    HEAD CTA:  1.  Decreased arborization throughout  the right MCA territory  correlating with the areas of encephalomalacia.  2.  The remainder of the head CTA is normal.  3.  Essentially fetal origin of the right posterior cerebral artery.    NECK CTA:  1.  Normal neck CTA  for age.  2.  No significant stenosis as per NASCET criteria.    Nonenhanced head CT findings were discussed with Dr. Alonzo at 2:41 PM  hours and head and neck CTA findings were discussed with Dr. Alonzo at  3:00 PM hours on 4/21/2019.    Radiation dose for this scan was reduced using automated exposure  control, adjustment of the mA and/or kV according to patient size, or  iterative reconstruction technique.      GISSELLE LAURA MD   POC US ECHO LIMITED    Impression    Error in type of ultrasound, this was for needle placement.    Ultrasound used for placement of central line for needle guidance.  Real-time central line placement was performed using ultrasound guidance with successful placement of right-sided internal jugular vein catheter.  Wire visualized in the middle of the internal jugular vein.       XR Chest Port 1 View    Narrative    CHEST PORTABLE ONE VIEW   4/21/2019 3:07 PM     COMPARISON: Two view chest x-ray 1/22/2013.    HISTORY: Hypotension, weakness.    FINDINGS: The cardiac silhouette, pulmonary vasculature, lungs and  pleural spaces are within normal limits.      Impression    IMPRESSION: Clear lungs.    LINK YOUSSEF MD   Chest  XR, 1 view portable    Narrative    CHEST PORTABLE ONE VIEW   4/21/2019 5:02 PM     HISTORY: Post central line placement.      Impression    IMPRESSION: Right internal jugular central line is in place with the  tip projecting in the mid superior vena cava. There may be linear  atelectasis overlapping the right and left hemidiaphragms. The lungs  are otherwise grossly clear.     EDISON DUTTA MD

## 2019-04-22 NOTE — PLAN OF CARE
Vital signs:  Temp: 98.2  F (36.8  C) Temp src: Rectal BP: 120/64 Pulse: 89 Heart Rate: 89 Resp: 22 SpO2: 98 %       VSS pt temp remains within parameters. BP stable with MAPS >65. Pt using home CPap for bedtime. Resp even and unlabored. Pt has had 1 loose stool this shift, place osotomy bag for collection of liquid stool. BGs within target range, cont on insulin gtt, see MAR for blood glucose levels. All needs are met will cont to monitor

## 2019-04-22 NOTE — PROGRESS NOTES
S-(situation): Patient arrives to room 217 via cart from ED    B-(background): Sepsis    A-(assessment): Low temp, BP stable MAP >65.  Pt placed initially on bare hugger to stabilize temp at 38 degrees. Pt is alert to self and place only disoriented to time and situation. Resp even and unlabored, tolerating 2L nasal cannula at this time. R Central line intact and patent. CVP monitoring initiated. Abd obese rounded. BS present. Trent feet +2 edema, pulses present. Cast catheter patent and draining yellow cloudy urine , low urine output at this time will cont to monitor No signs of distress noted.    R-(recommendations): Orders reviewed with patient. Will monitor patient per MD orders. yes    Inpatient nursing criteria listed below were met:    Health care directives status obtained and documented: Yes  SCD's Documented: Yes  Skin issues/needs documented:Yes  Isolation needs addressed, if appropriate: NA  Fall Prevention: Care plan updated, Education given and documented Yes  MRSA swab completed for patient 55 years and older (exclude CAM and TKA): Yes  Care Plan initiated: Yes  Education Assessment documented:Yes  Education Documented (Pre-existing chronic infection such as, MRSA/VRE need education on admission): Yes  Admission Medication Reconciliation completed: Yes  New medication patient education completed and documented (Possible Side Effects of Common Medications handout): Yes  Home medications if not able to send immediately home with family stored here: NA  Reminder note placed in discharge instructions: NA  Discharge planning review completed (admission navigator) Yes

## 2019-04-22 NOTE — PLAN OF CARE
Max temp of 99.5 oral. WBC trending upward.  Tele SR with a heart rate in the 90's.  BP's WNL - levophed gtt was not started.  Insulin gtt infusing, blood sugars 110-180's.  Insulin currently infusing at 1 unit/hr on alg 2.  300 ml of camacho urine out in melo.  Pt has a rectal bag placed to help contain liquid stool - 250 ml of reddish brown stool since admission.  Pt denied pain t/o the night but did state abdomen was tender this am after he became nauseated and had an approx 200 ml emesis.  Zofran given. Home cpap machine in place to room air.  T & R q 2 hrs, ceiling lift used.  Pt does use call light and is able to verbalize his needs.

## 2019-04-22 NOTE — PROGRESS NOTES
Patient has continued to be vitally stable throughout the day - blood pressures stable and not requiring any additional IV boluses.  Renal function continues to be borderline but increasing hourly frequency this afternoon.  Procalcitonin from this morning has returned and is 21.77 (previously was 0.31 at time of presentation).  Lactic has improved to 2.2.  Renal function overall stable at 1.65 on recheck at 1200.  Patient's CT of the abdomen has also been completed and shows colitis appearance of the distal colon, especially the sigmoid but some involvement of the rectum as well, which is suspicious for infectious vs inflammatory vs ischemic changes per the radiologist.  Patient continues to be alert, denies abdominal pain unless palpated.  C diff testing is currently in processing.      Will continue with current cares, plan to recheck procalcitonin level tomorrow in addition to other blood work ordered.  Will repeat BMP and lactic acid at 1800 and continue to monitor patient closely.    Electronically Signed:  Elena Palomares MD

## 2019-04-22 NOTE — PROGRESS NOTES
SPIRITUAL HEALTH SERVICES  SPIRITUAL ASSESSMENT Progress Note  [facility]      During Rounding,  introduced himself to Derek Mahesh and informed him of his availability.    Kenny Gr M.Div., [facility]  Staff   Office tel: [phone]

## 2019-04-22 NOTE — PROVIDER NOTIFICATION
DATE: 4/22/2019    TIME OF RECEIPT FROM LAB:  1245  LAB TEST:  LACTIC   LAB VALUE:  2.2  RESULTS GIVEN WITH READ-BACK TO (PROVIDER):  DR GREGORY  TIME LAB VALUE REPORTED TO PROVIDER:   1250  NEW ORDERS: NO

## 2019-04-22 NOTE — PLAN OF CARE
Pt had surgical consult this AM. Pt had a CT today.  Remains NPO.  Reports intermittent nausea, worse after taking in some po contrast, IV compazine given.  Pt reports abdominal pain with palpation, declines analgesics all shift. Has  stool collection device in place and has brown, red liquid stool. Cast with dark camacho/red urine.  Fan at bedside per pt request for comfort.  Pt remains on his home CPAP.  No O2.  Loose, congested cough. LS diminished. Temp max 100.1 this shift. CVP mostly 10-15 today.  Procalcitonin elevated.  Lactic acid is improving today.  Continues on IV antibiotics, IV fluids, and IV insulin gtt.  Wife at bedside this AM and supportive.

## 2019-04-23 PROBLEM — K52.9 COLITIS: Status: ACTIVE | Noted: 2019-01-01

## 2019-04-23 NOTE — PLAN OF CARE
Oral temp high this shift 99.2. BP trending down throughout shift. BP consistently in 70s,80s/40s,50s. Pt denies any symptoms.  MD notified with orders to bolus pt per MAR. Pts BP remain unchanged after bolus MD notified with orders to start Levophed drip as previously ordered.  Pt c/o some abdominal discomfort with repositioning. 70mls of liquid brown/dark red stool. Fecal collection device in place. Coccyx red but blanchable. Pt repositioned every 2 hours. Cast catheter in place patent draining cloudy tea/brown colored urine. Pt remains on insulin drip per orders. Pt c/o some nausea this shift PRN zofran given which has been effective. BS active throughout. Pt remains NPO. Alert and oriented but forgetful. Pr sleepy easily woken. . On tele showing NSR with rates in 80s. Pt wearing home bipap while sleeping. Pt moves mask so it only covers nose. Pt desats while sleeping. Mask re-placed over mouth and nose and Saturations improve to >90% on RA. Bilateral lower extremity edema remains 2+. Extremities cool to touch. Lactic 0.7, HGB 9.9 this am. No complaints.

## 2019-04-23 NOTE — PROGRESS NOTES
[facility]    Medicine Progress Note - Hospitalist Service       Date of Admission:  4/21/2019  Assessment & Plan      Principal Problem:    Septic shock (H)    Assessment: Characterized initially by fever, leukocytosis, lactic acid of 6.2, and severe hypotension and required numerous fluid bolusing but did not end up requiring vasopressor support initially as blood pressure stability occurred.  Patient subsequently has developed signs of acute kidney injury with poor urinary output and worsening abdominal pain associated with nausea and diarrhea which may be primary source of infection versus ischemic bowel injury secondary to sepsis with colitis seen in the sigmoid and rectum on CT scan.  Patient also developed recurrent hypotension earlier this morning that did not respond to a fluid bolus given and was started on Levophed at 0600 with improvement of the blood pressures noted and increased urinary output present.  MAPs now in the 70-80 range and Levophed was being weaned down as tolerated.  Leukocytosis initially worsened from 13,000 up to 27,000 despite initial intervention however has improved down to 12.5 today.  Initial procalcitonin was 0.031 with increase up to 21.77, morning repeat today is pending.  Enteric stool testing and C diff testing were negative.  Lactic acid has now normalized (0.7 this morning).   Patient is more somnolent today and less alert than yesterday afternoon.  Abdominal exam is improved without involuntary guarding present today    Plan:  Secondary to worsening blood pressures and need for Levophed initiation 36 hours after initial presentation in patient severe septic shock with worsening renal function with hemoglobin continuing to drift downward, discussed case with Dr. Haider, tele-ICU provider to review case and make sure current management plan is appropriate and no further interventions would be recommended.  Dr. Haider kindly reviewed the case  and recommends addition of IV Protonix for GI protection but otherwise feels current management plan is appropriate.  Will continue with Zosyn and Vanco, renally dosed, for antibiotics.  Did discuss consideration of transitioning to Merrem instead of Zosyn given worsening renal function and concern for nephrotoxicity however Dr. Haider feels the Zosyn and Vanco is appropriate at this time as long as it can be renally dosed.  Will continue with Levophed with goals of MAPs over 65 and ongoing monitoring of hemoglobin with consideration of transfusion if hemoglobin is less than 7.0 or sooner if active bleeding is noted.  Monitor for repeat procalcitonin results, further fluid bolusing as needed but would consider LR going forward if needed.  Continue close monitoring of renal function and urinary output.  Appreciate general surgery ongoing assistance in evaluation.  Family is aware the patient is still critically ill, has needed the addition of pressor support for ongoing low blood pressures, and that the source of infection remains unknown at this time.      Active Problems:    Colitis; Abdominal pain, generalized    Assessment:  Was not present at time of admission but developed within the first 24 hours of admission and associated with nausea and vomiting this morning with CT scan imaging showing a sigmoid colitis of unclear etiology (infection vs ischemic vs inflammatory).  Patient does have sepsis of unclear etiology with infectious colitis possible but also had a prolonged period of significant hypotension and is at risk for ischemic bowel.  C. Diff testing and enteric stool testing have been negative.      Plan: continue with antibiotics as above, ongoing close monitoring of rectal output and hemoglobin stability and maintaining adequate perfusion to avoid further any further ischemic injury.      Diarrhea    Assessment: Noted in the emergency room and unclear if it was present prior to admission but has been  ongoing since admission. Enteric stool testing is negative, positive for occult blood with hemoglobin dropping 4 grams since admission.  No active bleeding has been noted in the stool but old blood has been present    Plan: Proceed with plan as above      Acute kidney injury (H)    Assessment: not present at time of admission but developing in the past 16 hours and likely secondary to the hypotension and septic shock as above.  Patient's urinary output overnight decreased overnight and creatinine was 1.80 this morning however in the past two hours since adequate perfusion has been achieved, patient has voided 200 cc.  Potassium is normal.        Plan: We will continue with current IV fluids at 150 cc an hour, continue with Levaphed to keep MAPS at 65 or above, nursing will monitor urinary output closely and inform it it once more becomes less than 30 cc an hour.  We will recheck BMP every 6 hours for further evaluation of kidney function, creatinine clearance, and potassium and proceed with plan as above for ongoing monitoring and treatment of septic shock      Leukocytosis    Assessment: present at time of admission and thought secondary to septic shock as above.  Increased from 13.7 up to 27.0 initially but has now decreased to 12.5     Plan: Will continue with plan as above, continue Zosyn and Vanco antibiotics, recheck procalcitonin and CBC tomorrow morning for ongoing monitoring      Metabolic acidosis, increased anion gap    Assessment: Present initially in part secondary to lactic acidosis.  Has subsequently resolved with fluid resuscitation and remains resolved this morning    Plan: We will continue with bicarbonate containing IV fluids for now given ongoing large volume diarrhea but monitor closely.  Repeat VBG tomorrow morning      Unresponsive episode    Assessment: Occurring prior to this hospitalization, thought secondary to septic shock and severe  hypotension as above.  Patient is sleepy this morning  but has been alert and mentating well yesterday and last evening prior to hypotension recurrence.     Plan: Continue with treatment as above and monitor closely for neurological stability as perfusion remains adequate.        Anemia    Assessment:   Hemoglobin has decreased from 14 down to 9.9 since time of admission.  Patient has received numerous fluid boluses which may be causing partial hemodilution effect however occult stool was positive for blood and there is concern for possible GI loss as well with hemoglobin continuing to decrease from 11.4 down to 9.9 in the past 24 hours despite minimal additional fluid bolusing performed.    Plan:   Continue to perform hemoglobin every 6 hours for further monitoring of blood loss and consider transfusion if hemoglobin goes less than 7.0 or if active bleeding is identified.  Will discontinue Lovenox for VTE prophylaxis and start SCDs instead.        Type 2 diabetes mellitus with complication, with long-term current use of insulin (H)    Assessment: Normally on Lantus 15 units in the AM and 25 units at bedtime in addition to metformin with diabetes well controlled with hemoglobin A1C 6.2 on onset of this hospitalization.  Home regimen has been held and patient continues to be NPO and has been on an insulin drip with blood sugars well controlled overnight.      Plan: Will continue with insulin drip for now and titrate as needed.  As patient is improving and diet can be started, would consider transition to subcutaneous insulin regimen.        Hemiparesis of left nondominant side as late effect of cerebral infarction (H)    Assessment: Chronic and stable    Plan: Continue supportive cares      Peripheral vascular disease (H)    Assessment:  Previous history    Plan: Continue to hold chronic medications including aspirin secondary to concerns of blood loss as above      History of CVA (cerebrovascular accident) wit left hemiparesis    Assessment: Previous history, no  evidence of acute focal neurological deficits    Plan: Continue to hold aspirin secondary to concern for bleed as above and monitor closely      Essential hypertension with goal blood pressure less than 140/90    Assessment: Present at baseline, however patient was severely hypotensive on initial presentation with persistent hypotension despite multiple fluid boluses and patient now on Levophed support.  Home regimen has been held since admission    Plan: continue to hold home regimen and monitor blood pressure closely, when off Levophed as above      Obesity, morbid, BMI 40.0-49.9 (H)    Assessment: Chronic and stable    Plan: No acute intervention needed      Encephalomalacia with old cerebral infarction (H) right MCA territory    Assessment: Chronic, does increase the risk of possible seizure activity    Plan: Continue to monitor for neurological stability      Aphasia as late effect of stroke    Assessment:  Chronic and mild with patient able to answer some questions easily and other questions he is unable to answer at all    Plan: Continue supportive care      Wheelchair bound    Assessment: Chronic following his previous CVA,  family uses a lift at home    Plan: We will continue with Arnulfo lift at this time, will consider physical therapy evaluation as patient clinically improved      MIO (obstructive sleep apnea)    Assessment: On Home device    Plan: continue home device without change       Diet: NPO for Medical/Clinical Reasons Except for: Meds    DVT Prophylaxis: SCDs  Cast Catheter: in place, indication: Strict 1-2 Hour I&O  Code Status: DNR/DNI      Disposition Plan   Expected discharge: 3-5 days, recommended to transitional care unit once safe disposition plan/ TCU bed available and SIRS/Sepsis treated.  Entered: Elena Palomares MD 04/23/2019, 9:33 AM     The patient's care was discussed with the Bedside Nurse, Patient and Patient's Family.    A total of 52 minutes was spent on critical  care management of this patient at this time.    Elena Palomares MD  Hospitalist Service  [facility]    ______________________________________________________________________    Interval History   Patient began having hypotension again overnight  that did not respond to fluid bolusing and was initiated on levophed this morning with perfusion appropriate and patient starting to make increased urinary output.  Heart rate has been normal, patient has been afebrile For over 24 hours.  Some improvement white blood cell count but ongoing worsening creatinine noted on lab work.  Patient is more somnolent this morning compared to yesterday.  Ongoing diarrhea that appears to contain old blood but no acute blood is noted.       Data reviewed today: I reviewed all medications, new labs and imaging results over the last 24 hours.    Physical Exam   Vital Signs: Temp: 98  F (36.7  C) Temp src: Oral BP: 118/59 Pulse: 77 Heart Rate: 78 Resp: 22 SpO2: 94 % O2 Device: BiPAP/CPAP(home bipap)    Weight: 273 lbs 5.93 oz  Constitutional: sleeping on entry to the room and awakens to voice but falls asleep easily mid sentence.  Respiratory: No increased work of breathing, decreased air exchange however patient is not consistently able to take deep breaths when asked due to current level of sleepiness, clear to auscultation bilaterally, no crackles or wheezing  Cardiovascular: Normal apical impulse, regular rate and rhythm  GI: bowel sounds present, abdomen obese and firm but overall unchanged from yesterday with ongoing pain on palpation of the left lower and suprapubic pain with guarding present  Musculoskeletal: Patient has 1-2+ pitting edema in bilateral lower extremities  Neurologic: sleeping on entry into the room, awakens to voice but falls asleep easily.  Does not appear to be aware of place, time or situation currently

## 2019-04-23 NOTE — PROGRESS NOTES
Md notified of the following items:    - Urine output of 90ml since 1900  - Blood sugars  for past 16 hours. Continue with insulin drip?   - Continue hemoglobin draws every 6 hours as stated in MD note? No further checks ordered after 1800    Awaiting response

## 2019-04-23 NOTE — PROVIDER NOTIFICATION
Informed Dr. Smyth SBP 79/47, MAP 50's-60's since 0230, and uo has been 35cc since 2300.  Bolus of 1 liter NS to be given now, said do not start levophed at this time.

## 2019-04-23 NOTE — PROVIDER NOTIFICATION
Talked with Dr. Smyth about hgb every 6 hours as stated in Dr. Palomares's note.  There hasn't been a hgb since 1800 so said to get one now.  Reviewed blood sugars from past day, doing blood sugars every 2 hours, said to continue drip.  Informed Dr. Smyth pt UOP is 90ml since 7P, and CVP 12-15, said to watch for now.

## 2019-04-23 NOTE — PROGRESS NOTES
DATE: 4/23/2019    TIME OF RECEIPT FROM LAB:  0710  LAB TEST:  Vanco level  LAB VALUE:  33.0  RESULTS GIVEN WITH READ-BACK TO (PROVIDER):  Osgood note left   TIME LAB VALUE REPORTED TO PROVIDER:   0710  NEW ORDERS: no new orders at this time

## 2019-04-23 NOTE — PROVIDER NOTIFICATION
Blood pressures consistently 70's-80's/40's with maps in the 50's despite 1L fluid bolus. Dr. Smyth notified and verbal order obtained to start Levophed.

## 2019-04-23 NOTE — PROGRESS NOTES
DATE: 4/23/2019    TIME OF RECEIPT FROM LAB:  0141  LAB TEST:  Vanco  LAB VALUE:  40.1  RESULTS GIVEN WITH READ-BACK TO (PROVIDER):  Pharmacist Jose Ramon notified  TIME LAB VALUE REPORTED TO PROVIDER:   0141  NEW ORDERS: Will not receive Vanco at scheduled time yane

## 2019-04-23 NOTE — PLAN OF CARE
S. End of shift report    B. Sepsis    A. BP 's/60's HR 90's Tele SR. Patient alert to self and place. Lungs diminished with +BS.  90cc's tan urine from the melo with decreasing redness. Loose bloody brown stool. Patient has been resting with the Bipap on since 1900 with oral care and mouth swabs x2. Patient has pain in the abdomen when palpated only and declines pain meds this shift.         R. Will continue to monitor per POC.

## 2019-04-23 NOTE — PROGRESS NOTES
"Tele-ICU Note  I discussed case with Dr. Palomares and appreciate her fine care. He was admitted with septic shock likely due to colitis and/or UTI. He had improved with aggressive resuscitation the first day but has decompensated over the past 24 hours. He is now hypotensive on levophed with rising creatinine.     He has received about 10 liters of IV fluid over past 24 hours, and creatinine is now 1.80 (baseline 1-1.1).    After discussion my impression is the following, with therapeutic recommendations as noted:    1. Septic shock, source not clear but concern for colitis and/or UTI- I agree with aggressive fluids as he appears still to be on \"dry\" side and remains on room air. I agree with continuing Zosyn and Vanco, with dosing renal adjusted. His jump in procalcitonin to 20+ highly suggestive of active infection. I would monitor closely with surgery following with possible colitis and continue Levophed as appropriate.   2. Diarrhea- C. Diff negative; follow with concern of blood in bowel. I agree with serial Hb's and rectal tumbe  3. Decreased Hgb- possible ischemic colitis vs. Occult GI bleed- protonix; track Hgb every 6 hours and agree with protonix  4. ATN due to septic shock- follow creatinine closely     Other issues:  MODS  DM  History HTN  Encephalopathy   History of CVA with left hemiparesis  MIO/BIPAP  Morbid obesity     He is critical with septic shock and MODS; we will monitor closely with you;  Please do not hesitate to call.    luis dupree  April 23, 2019  "

## 2019-04-23 NOTE — PROGRESS NOTES
Wife, Mattie, was present at the bedside this afternoon.  Discussed in great detail the patient's clinical course and ongoing septic shock.  Did discuss the ongoing need for Levophed but the improvement in renal function including decreasing creatinine and improved urine output, improvement in procalcitonin, and improvement in white blood cell count which may indicate cautious signs for improvement.  Patient does remain at high risk for worsening acute kidney injury or endorgan failure of another system. Mattie is aware he remains critically ill and may not survive this hospitalization and is also aware that if he does survive this hospitalization he most likely will need TCU placement at time of discharge to help assist in strengthening and recovery.  She is appreciative of the care that has been given and would like the current level of aggression to be continued, however she would want to be contacted before any additional services were present, such as giving blood, adding additional vasopressor support, etc. Did discuss dialysis specifically in case endorgan renal failure were to occur.  Mattie states that the entire family  is in agreement that the patient would not want dialysis and the goal at that time would be to transition to comfort care at that time.     We will continue with critical care management and close monitoring as previously planned.    Electronically Signed:  Elena Palomares MD

## 2019-04-23 NOTE — PROGRESS NOTES
DATE:  4/23/2019   TIME OF RECEIPT FROM LAB:  1045  LAB TEST:  procalcitonin  LAB VALUE:  17.49  RESULTS GIVEN WITH READ-BACK TO (PROVIDER):  Elena Palomares MD  TIME LAB VALUE REPORTED TO PROVIDER:   5073

## 2019-04-23 NOTE — PROGRESS NOTES
"GENERAL SURGERY PROGRESS NOTE    Name:  Derek Aragon Date/Time of Admission: 2019  2:28 PM   CSN: [id number]  Attending Provider: Celso Dewitt MD   Room/Bed:  [address]17-01 : 1940 78 year old       2019   HD #3 AMS, septic shock, ischemic colitis    SUBJECTIVE:     Patient seen and examined.  Low BP overnight requiring lose dose levophed.  Mentation unchanged this AM;  Alert to voice and pain.  Melo still in place; noted rectal output into melo bag.      Current Meds:  Scheduled Meds:    enoxaparin  40 mg Subcutaneous Q24H     heparin lock flush  5-10 mL Intracatheter Q24H     piperacillin-tazobactam  4.5 g Intravenous Q6H     sodium chloride (PF)  3 mL Intravenous Q8H     vancomycin place hadley - receiving intermittent dosing  1 each Intravenous See Admin Instructions     Continuous Infusions:    insulin (regular) 0.2 Units/hr (19 0606)     IV infusion builder WITH additives 150 mL/hr at 19 0629     norepinephrine Stopped (19 0814)     sodium chloride 10 mL/hr at 19 1844     PRN Meds:.glucose **OR** dextrose **OR** glucagon, heparin lock flush, HYDROmorphone, lidocaine 4%, lidocaine (buffered or not buffered), naloxone, ondansetron **OR** ondansetron, prochlorperazine **OR** prochlorperazine **OR** prochlorperazine, sodium chloride (PF)     OBJECTIVE:     Vital Signs:  /59   Pulse 77   Temp 98  F (36.7  C) (Oral)   Resp 22   Ht 1.753 m (5' 9\")   Wt 124 kg (273 lb 5.9 oz)   SpO2 95%   BMI 40.37 kg/m       I/O:    I/O last 3 completed shifts:  In: 4937.3 [P.O.:120; I.V.:4817.3]  Out: 1167 [Urine:542; Stool:625]   No intake/output data recorded.      PHYSICAL EXAMINATION   General  Bi pap in place; alert to voice and pain   Lungs normal chest wall excursion   Heart S1, S2   Abdominal Soft; mildly distended; morbidly obese; generalized pain on deep palpation   Extremities No gross deformities noted appreciated    Incisions n/a     Labs:   Last CBC w diff " Results:  Recent Labs   Lab 04/23/19  0510 04/22/19  1830 04/22/19  0615   WBC 12.5* 19.8* 27.0*   HCT 31.3* 35.8* 37.4*    207 271   RBC 3.32* 3.84* 4.01*   MCV 94 93 93   MCHC 31.6 31.8 31.6   MCH 29.8 29.7 29.4   RDW 14.4 14.4 13.9    Last Renal Function Results:  Recent Labs   Lab 04/23/19  0510 04/22/19  1830 04/22/19  1200 04/22/19  0615  04/21/19  1440    143 142 140   < > 138   CO2 24 24 25 24   < > 19*   BUN 31* 31* 32* 30   < > 28   ALBUMIN 2.1*  --   --  2.6*  --  3.5    < > = values in this interval not displayed.                Last Liver Function/Coags/Cardiac Results:  Recent Labs   Lab 04/23/19  0510 04/22/19  0615 04/21/19  1440   ALT 10 15 20   AST 13 22 22   BILITOTAL 0.5 0.6 0.6   ALKPHOS 57 68 109   PTT  --   --  33   INR  --   --  1.24*    Microbiology:  [unfilled]   [unfilled]      Imaging Studies: [unfilled]    ASSESSMENT:     Principal Problem:    Septic shock (H)  Active Problems:    Hemiparesis of left nondominant side as late effect of cerebral infarction (H)    Peripheral vascular disease (H)    History of CVA (cerebrovascular accident) wit left hemiparesis    Essential hypertension with goal blood pressure less than 140/90    Obesity, morbid, BMI 40.0-49.9 (H)    Type 2 diabetes mellitus with complication, with long-term current use of insulin (H)    Diarrhea    Metabolic acidosis, increased anion gap    Unresponsive episode    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    -WBC trending down and lactic acid normalized  -C  diff negative  -no signs of peritonitis this AM    PLAN:     Wean pressors  Consider intermittent bolus of colloid rather than all crystalloid for resuscitation  Will cont to follow  No surgical intervention at this point  Cont NPO until mentation improved; otherwise may consider tube feeds via NGT at low rate.    Time talking to patient including looking up records and talking to other physicians and nurses about patient care is 30mins .    Electronically signed by:  Homero Carpenter MD  4/23/2019

## 2019-04-23 NOTE — PROGRESS NOTES
DATE:  4/23/2019   TIME OF RECEIPT FROM LAB:  0707  LAB TEST:  Vanco level  LAB VALUE:  33  RESULTS GIVEN WITH READ-BACK TO (PROVIDER):  Pratik  TIME LAB VALUE REPORTED TO PROVIDER:   0815 with morning rounds

## 2019-04-24 PROBLEM — R19.5 HEME POSITIVE STOOL: Status: ACTIVE | Noted: 2019-01-01

## 2019-04-24 PROBLEM — K55.9 ISCHEMIC COLITIS (H): Status: ACTIVE | Noted: 2019-01-01

## 2019-04-24 PROBLEM — I48.0 PAROXYSMAL ATRIAL FIBRILLATION (H): Status: ACTIVE | Noted: 2019-01-01

## 2019-04-24 NOTE — PROVIDER NOTIFICATION
.DATE:  4/23/2019   TIME OF RECEIPT FROM LAB:  1940  LAB TEST:  vanco  LAB VALUE: 27.3  RESULTS GIVEN WITH READ-BACK TO (PROVIDER):  Dr. Smyth  TIME LAB VALUE REPORTED TO PROVIDER:   1945

## 2019-04-24 NOTE — PHARMACY-VANCOMYCIN DOSING SERVICE
Pharmacy Vancomycin Note  Date of Service 2019  Patient's  1940   78 year old, male    Indication: Sepsis  Goal Trough Level: 15-20 mg/L  Day of Therapy: 4  Current Vancomycin regimen: Was on 2,000 mg IV q12h initially, dose changed to intermitten    Current estimated CrCl = Estimated Creatinine Clearance: 61.1 mL/min (A) (based on SCr of 1.35 mg/dL (H)).    Creatinine for last 3 days  2019:  2:40 PM Creatinine 1.02 mg/dL;  5:05 PM Creatinine 1.14 mg/dL  2019:  6:15 AM Creatinine 1.52 mg/dL; 12:00 PM Creatinine 1.65 mg/dL;  6:30 PM Creatinine 1.73 mg/dL  2019:  5:10 AM Creatinine 1.80 mg/dL;  1:00 PM Creatinine 1.65 mg/dL;  7:03 PM Creatinine 1.45 mg/dL; 11:50 PM Creatinine 1.38 mg/dL  2019:  4:35 AM Creatinine 1.35 mg/dL    Recent Vancomycin Levels (past 3 days)  2019:  1:10 AM Vancomycin Level 40.1 mg/L;  6:36 AM Vancomycin Level 33.0 mg/L;  7:03 PM Vancomycin Level 27.3 mg/L    Vancomycin IV Administrations (past 72 hours)                   vancomycin (VANCOCIN) 2,000 mg in sodium chloride 0.9 % 500 mL intermittent infusion (mg) 2,000 mg New Bag 19 1642     2,000 mg New Bag  0409     2,000 mg New Bag 19 1536                Nephrotoxins and other renal medications (From now, onward)    Start     Dose/Rate Route Frequency Ordered Stop    19 1600  vancomycin (VANCOCIN) 2,000 mg in sodium chloride 0.9 % 500 mL intermittent infusion      2,000 mg  over 2 Hours Intravenous EVERY 48 HOURS 19 1302      19 1300  piperacillin-tazobactam (ZOSYN) infusion 3.375 g      3.375 g  100 mL/hr over 30 Minutes Intravenous EVERY 6 HOURS 19 1037      19 1800  norepinephrine (LEVOPHED) 16 mg in D5W 250 mL infusion      0.03-0.4 mcg/kg/min × 122.5 kg  3.4-45.9 mL/hr  Intravenous CONTINUOUS 19 6711               Contrast Orders - past 72 hours (72h ago, onward)    Start     Dose/Rate Route Frequency Ordered Stop    19 1045  iohexol  (OMNIPAQUE) solution 50 mL      50 mL Oral ONCE 04/22/19 1035 04/22/19 1428    04/21/19 1436  iopamidol (ISOVUE-370) solution 500 mL      500 mL Intravenous ONCE 04/21/19 1435 04/21/19 1439          Interpretation of levels and current regimen:  Trough level is  Supratherapeutic    Renal Function: Improving    Plan:  1.  Restart Vancomycin at 2,000 mg IV q48h with a predicted trough of 19.4 mg/L based on last two levels drawn. Expect renal function to continue to improve  2.  Pharmacy will check trough levels as appropriate in 3-5 Days.    3. Serum creatinine levels will be ordered daily for the first week of therapy and at least twice weekly for subsequent weeks.      Mynor Freedman RPH        .

## 2019-04-24 NOTE — PROVIDER NOTIFICATION
Informed Dr. Smyth of heart monitor showing wide complex AFib vs aberrant rhythm, see strips.  Dr. Smyth will order magnesium as add on, the am labs were drawn early.  BP stable.

## 2019-04-24 NOTE — PLAN OF CARE
Afebrile.  Levophed drip titrated from 0.03 mcg/kg/min up to 0.15 mcg/kg/min to keep MAPs >65.  Tele was in SR until 0330 then patient started having frequent PVC's and then some wide complex ventricular rhythms - see chart.  MD made aware and labs drawn early along with a magnesium level.  K+ noted to be 3.3 at midnight, MD notified and potassium replacement initiated - back at 3.8 and no further replacement needed.  Pt's hands and feet more edematous, appeared to have had a 5.4 kg wt gain since yesterday.  Pt lethargic but arousable to voice.  Answers questions and is able to verbalize his needs.  T & R q 2 hrs.  Groin folds reddened.  675 ml of camacho urine in melo.  100+ of liquid stool in fecal ,  was replaced as it was leaking - no open areas noted to rectum.  Zofran x 1 for nausea without emesis.

## 2019-04-24 NOTE — PLAN OF CARE
Patient MAP's were above 65, Levophed off at 0800. MAP's trending down during the am, placed back on Levophed. Continued with Levophed all day to keep BP MAP's above 65. Continuous Insulin drip continued  per recommendation of MD. BG were 100's-134. Cont to be in algorithm 1.  See MAR.   Urine output continues to be greater than 30 per hour with brown color urine. Fecal collection in place with liquid dark/red stool.   One episode of nausea. Gave zofran IV which eliminated nausea. Tender upon palpitation. Obese and round abd. Will cont to monitor labs.   Patient is alert to self and occasionally place. Pt did converse with wife today. Will cont to monitor.

## 2019-04-24 NOTE — PROGRESS NOTES
"GENERAL SURGERY PROGRESS NOTE    Name:  Derek Aragon Date/Time of Admission: 2019  2:28 PM   CSN: [id number]  Attending Provider: Celso Dewitt MD   Room/Bed:  [address]17-01 : 1940 78 year old       2019   HD #4 AMS, septic shock, ischemic colitis    SUBJECTIVE:     Patient seen and examined.  Low BP overnight requiring lose dose levophed.  Alert and answering some questions this AM.  Noted pain on deep palpation. Melo still in place; noted rectal output into melo bag.      Current Meds:  Scheduled Meds:    heparin lock flush  5-10 mL Intracatheter Q24H     pantoprazole (PROTONIX) IV  40 mg Intravenous Daily with breakfast     piperacillin-tazobactam  3.375 g Intravenous Q6H     sodium chloride (PF)  3 mL Intravenous Q8H     vancomycin place hadley - receiving intermittent dosing  1 each Intravenous See Admin Instructions     Continuous Infusions:    insulin (regular) 0.5 Units/hr (19 0605)     IV infusion builder WITH additives 150 mL/hr at 19 0527     norepinephrine 0.15 mcg/kg/min (19 0722)     sodium chloride 10 mL/hr at 19 1655     PRN Meds:.glucose **OR** dextrose **OR** glucagon, heparin lock flush, HYDROmorphone, lidocaine 4%, lidocaine (buffered or not buffered), naloxone, ondansetron **OR** ondansetron, potassium chloride, potassium chloride with lidocaine, potassium chloride, potassium chloride, potassium chloride, prochlorperazine **OR** prochlorperazine **OR** prochlorperazine, sodium chloride (PF)     OBJECTIVE:     Vital Signs:  /48   Pulse 77   Temp 97.6  F (36.4  C) (Oral)   Resp (!) 0   Ht 1.753 m (5' 9\")   Wt 129.4 kg (285 lb 4.4 oz)   SpO2 91%   BMI 42.13 kg/m       I/O:    I/O last 3 completed shifts:  In: 4321 [I.V.:4321]  Out: 1625 [Urine:1350; Stool:275]   No intake/output data recorded.      PHYSICAL EXAMINATION   General  Alert but confused.     Lungs normal chest wall excursion   Heart S1, S2   Abdominal Soft; mildly distended; " morbidly obese; generalized pain on deep palpation   Extremities No gross deformities noted appreciated    Incisions n/a     Labs:   Last CBC w diff Results:  Recent Labs   Lab 04/24/19  0435 04/23/19  0510 04/22/19  1830 04/22/19  0615   WBC  --  12.5* 19.8* 27.0*   HCT  --  31.3* 35.8* 37.4*    172 207 271   RBC  --  3.32* 3.84* 4.01*   MCV  --  94 93 93   MCHC  --  31.6 31.8 31.6   MCH  --  29.8 29.7 29.4   RDW  --  14.4 14.4 13.9    Last Renal Function Results:  Recent Labs   Lab 04/24/19  0435 04/23/19  2350 04/23/19  1903  04/23/19  0510  04/22/19  0615    143 143   < > 144   < > 140   CO2 23 23 25   < > 24   < > 24   BUN 25 26 28   < > 31*   < > 30   ALBUMIN 2.2*  --   --   --  2.1*  --  2.6*    < > = values in this interval not displayed.                Last Liver Function/Coags/Cardiac Results:  Recent Labs   Lab 04/24/19  0435 04/23/19  0510 04/22/19  0615 04/21/19  1440   ALT 10 10 15 20   AST 8 13 22 22   BILITOTAL 0.5 0.5 0.6 0.6   ALKPHOS 63 57 68 109   PTT  --   --   --  33   INR  --   --   --  1.24*    Microbiology:  [unfilled]   [unfilled]      Imaging Studies: [unfilled]    ASSESSMENT:     Principal Problem:    Septic shock (H)  Active Problems:    Hemiparesis of left nondominant side as late effect of cerebral infarction (H)    Peripheral vascular disease (H)    History of CVA (cerebrovascular accident) wit left hemiparesis    Essential hypertension with goal blood pressure less than 140/90    Obesity, morbid, BMI 40.0-49.9 (H)    Type 2 diabetes mellitus with complication, with long-term current use of insulin (H)    Diarrhea    Metabolic acidosis, increased anion gap    Unresponsive episode    Encephalomalacia with old cerebral infarction (H) right MCA territory    Aphasia as late effect of stroke    Wheelchair bound    Acute kidney injury (H)    Leukocytosis    Abdominal pain, generalized    Colitis    MIO (obstructive sleep apnea)    78 y.o M HD #4 for AMS, septic shock,  ischemic colitis  -BP improved this AM - hypotension last PM requiring low dose levophed  -CT reviewed - consistent with ischemic colitis but no signs of pneumatosis, pneumoperitonum, portal venous gas, abscess, or perforation.    -WBC trending down and lactic acid normalized  -C diff negative  -no signs of peritonitis this AM    PLAN:     Wean pressors  Consider intermittent bolus of colloid rather than all crystalloid for resuscitation  Will cont to follow  No surgical intervention at this point  Recommend starting diet if pass swallow study; otherwise recommends NGT for TFs or trickle feeds.      Time talking to patient including looking up records and talking to other physicians and nurses about patient care is 15 mins .    Electronically signed by:  Homero Carpenter MD  4/23/2019

## 2019-04-24 NOTE — SIGNIFICANT EVENT
Significant Event Note    Time of event: 3:20 PM April 24, 2019    Description of event:  Nursing reports the patient is tolerating clear liquids today without difficulty and has been requesting frequent snacks.  As his oral intake has increased with clear liquids, his blood sugars are trending upward despite continuous insulin infusion.      Plan:  Continue insulin infusion according to protocol  Add prandial dose of NovoLog by correction scale every 4 hours as needed for any oral intake including meals or snacks  Continue to restrict diet to clear liquid diet    Discussed with: bedside nurse    Celso Dewitt MD

## 2019-04-24 NOTE — PROGRESS NOTES
[facility]    Medicine Progress Note - Hospitalist Service       Date of Admission:  4/21/2019  Assessment & Plan    78-year-old man admitted with concern for acute life-threatening septic shock with test results now suspicious for ischemic colitis involving the distal colon as the probable source for infection.  He remains critically ill requiring continuous vasopressor therapy to maintain normotension but does appear to be trending toward improvement with improving acute kidney injury, lactic acidosis, and decreasing procalcitonin level.  He has developed dysrhythmias on telemetry monitoring most suspicious for paroxysmal atrial fibrillation with aberrant conduction.  This raises concern for the possibility of acute ischemic bowel due to thromboembolism from atrial fibrillation.  He does also have a history of peripheral artery disease and radiographic findings were consistent with the PAD at least involving the superior mesenteric artery, but it is not known whether the inferior mesenteric artery is involved with significant atherosclerotic vascular disease.  Signs of ischemic colitis including abdominal pain, diarrhea, and heme positive stools are starting to improve.  Anemia has stabilized arguing against ongoing active bleeding.  Metabolic acidosis also appears to be stabilizing.  There continues to be concern for this illness being an acute life-threatening event.    Principal Problem:    Septic shock (H)  Active Problems:    Ischemic colitis (H)    Peripheral vascular disease (H)    Type 2 diabetes mellitus with complication, with long-term current use of insulin (H)    Diarrhea    Metabolic acidosis, increased anion gap    Acute kidney injury (H)    Leukocytosis    Heme positive stool    MIO (obstructive sleep apnea)    Hemiparesis of left nondominant side as late effect of cerebral infarction (H)    History of CVA (cerebrovascular accident) wit left hemiparesis    Essential  hypertension with goal blood pressure less than 140/90    Obesity, morbid, BMI 40.0-49.9 (H)    Unresponsive episode    Encephalomalacia 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 Carpenter who recommended nonoperative management at this time.  We also discussed possible follow-up for outpatient colonoscopy once recovered from this acute illness.  Transthoracic Echo today for ?intracardiac thrombus, unable to perform CIARRA at this facility  May consider additional vascular imaging of the abdomen depending upon echo results and clinical course if renal function continues to improve  Replace K to keep K 4 or higher to try to reduce risk for recurrent atrial fibrillation  Resume enoxaparin at prophylactic dose for now, increase to therapeutic dosing or switch to IV heparin if there is increasing suspicion for intracardiac thrombus and acute thromboembolism  Continue present antibiotics, today is day 4 of antibiotic therapy  Continue melo, rectal tube and CVC  Wean Levophed for MAP over 65  Continue IV fluids with bicarb and follow blood gases  Start clear liquids cautiously  Continue IV insulin, add prandial subcutaneous Novolog when indicated as POs advance    Diet: NPO for Medical/Clinical Reasons Except for: Meds    DVT Prophylaxis: SCDs, resume enoxaparin (Lovenox) SQ  Melo Catheter: in place, indication: Strict 1-2 Hour I&O  Code Status: DNR/DNI      Disposition Plan   Expected discharge: 4 - 7 days, recommended to transitional care unit once Medically stable.  Entered: Celso Dewitt MD 04/24/2019, 9:21 AM       The patient's care was discussed with the Bedside Nurse and Patient.    Total critical care time 50 minutes including the time spent reassessing his hemodynamic status and need for continuous norepinephrine infusion to maintain normal blood pressure readings to improve tissue perfusion for treatment of septic  shock.    Celso Dewitt MD  Hospitalist Service  [facility]    ______________________________________________________________________    Interval History   History from the patient is limited by his aphasia.  He seems to indicate that he is getting hungry and denies pain at this time.  No other subjective complaints are evident.  He denies shortness of breath or chest pain.  He denies nausea.  He has been afebrile and hemodynamically stable over the last day.  Oxygenation has been stable.  He is using his home CPAP machine on and off during sleep.  Urine output has been adequate.  Rectal tube output is decreasing according to nursing and is not as dark black as it appeared to be yesterday.  There has not been obvious bleeding from his rectal tube.    Data reviewed today: I reviewed all medications, new labs and imaging results over the last 24 hours. I personally reviewed Telemetry over the last day demonstrating intermittent self-limited episodes of wide-complex tachycardia with variable R-R interval and absent P waves most suspicious for atrial fibrillation with aberrant conduction..    Physical Exam   Vital Signs: Temp: 98.5  F (36.9  C) Temp src: Oral BP: 141/62 Pulse: 78 Heart Rate: 81 Resp: 26 SpO2: 95 % O2 Device: None (Room air) Oxygen Delivery: 1 LPM  Weight: 285 lbs 4.4 oz, CVP 9-11, presently on norepinephrine infusion 0.15 mcg/kg/min, urine output 1350 mL in the last day, cumulative intake exceeds output by 9.2 L so far during this hospital stay although total output has been difficult to measure accurately because of initial diarrhea  General Appearance: Presently does not appear to be in any acute distress while lying in bed, morbidly obese  Respiratory: Normal respiratory effort, clear lungs  Cardiovascular: Regular rate and rhythm, good radial pulse, normal capillary refill  GI: Hypoactive bowel sounds, morbidly obese, soft, obvious tenderness greatest in the left  lower quadrant, no involuntary guarding or rebound tenderness  Skin: Pale, no rashes evident  Other: Alert and appears to maintain wakefulness and attention, nods his head and uses yes or no answers to questions appropriately at    Data   Recent Labs   Lab 04/24/19  0435 04/23/19  2350 04/23/19  1903  04/23/19  0510  04/22/19  1830  04/22/19  0615  04/21/19  1440   WBC  --   --   --   --  12.5*  --  19.8*  --  27.0*  --  13.7*   HGB 10.8* 10.3* 10.3*   < > 9.9*   < > 11.4*   < > 11.8*  --  14.8   MCV  --   --   --   --  94  --  93  --  93  --  94     --   --   --  172  --  207  --  271  --  283   INR  --   --   --   --   --   --   --   --   --   --  1.24*    143 143   < > 144  --  143   < > 140   < > 138   POTASSIUM 3.8 3.3* 3.4   < > 3.5  --  4.1   < > 4.7   < > 4.3   CHLORIDE 110* 109 109   < > 110*  --  109   < > 108   < > 103   CO2 23 23 25   < > 24  --  24   < > 24   < > 19*   BUN 25 26 28   < > 31*  --  31*   < > 30   < > 28   CR 1.35* 1.38* 1.45*   < > 1.80*  --  1.73*   < > 1.52*   < > 1.02   ANIONGAP 11 11 9   < > 10  --  10   < > 8   < > 16*   MAKENNA 7.6* 7.3* 7.5*   < > 7.4*  --  7.8*   < > 8.1*   < > 9.6   * 119* 117*   < > 104*  --  113*   < > 120*   < > 236*   ALBUMIN 2.2*  --   --   --  2.1*  --   --   --  2.6*  --  3.5   PROTTOTAL 5.7*  --   --   --  5.2*  --   --   --  6.0*  --  8.0   BILITOTAL 0.5  --   --   --  0.5  --   --   --  0.6  --  0.6   ALKPHOS 63  --   --   --  57  --   --   --  68  --  109   ALT 10  --   --   --  10  --   --   --  15  --  20   AST 8  --   --   --  13  --   --   --  22  --  22   TROPI  --   --   --   --   --   --   --   --   --   --  <0.015    < > = values in this interval not displayed.     Blood cultures are negative to date and urine culture was negative, testing for enteric pathogens was negative, testing for C. difficile toxin PCR was negative, nasal culture for MRSA was negative  Procalcitonin 5.23 today, improved from 17.49 yesterday    Blood  sugars have ranged from 117-149 on continuous insulin infusion    Medications     insulin (regular) 0.5 Units/hr (04/24/19 0852)     IV infusion builder WITH additives 150 mL/hr at 04/24/19 0852     norepinephrine 0.12 mcg/kg/min (04/24/19 0851)     sodium chloride 10 mL/hr at 04/24/19 0852       heparin lock flush  5-10 mL Intracatheter Q24H     pantoprazole (PROTONIX) IV  40 mg Intravenous Daily with breakfast     piperacillin-tazobactam  3.375 g Intravenous Q6H     sodium chloride (PF)  3 mL Intravenous Q8H     vancomycin place hadley - receiving intermittent dosing  1 each Intravenous See Admin Instructions

## 2019-04-24 NOTE — PLAN OF CARE
Patient was titrated off Levophed for MAP's greater than 65.   Continuous insulin drip with -150. When patient started on clear liquid diet BG went to 180.   Patient is alert to self and place. He is making his needs known today.   VSS. Abebrile. Urine output greater than 30 every hour. Rectal pouch removed d/t leaking. Continues to have loose maroon to brown colored stool. ABD continues to be tender. Tolerating clear liquids. Turned every 2 hours. Will cont to monitor.

## 2019-04-25 PROBLEM — K92.1 HEMATOCHEZIA: Status: ACTIVE | Noted: 2019-01-01

## 2019-04-25 PROBLEM — D62 ANEMIA DUE TO BLOOD LOSS, ACUTE: Status: ACTIVE | Noted: 2019-01-01

## 2019-04-25 PROBLEM — E83.42 HYPOMAGNESEMIA: Status: ACTIVE | Noted: 2019-01-01

## 2019-04-25 NOTE — PROGRESS NOTES
Care Transitions visited with patient today.  Discussed the current recommendation for TCU placement due to his current weakness from his illness and hospitalization.  Discussed that [facility] has accepted him for placement and that he potentially could be ready for discharge on Saturday or Sunday.  [facility] has stated that they can accommodate an admission on the weekend. Patient stated agreement to this and that he had no questions about discharge planning at this time.      Writer has spoken with patient's wife, Mattie, over the phone.  She is in agreement with discharge to [facility].  They have had some unfortunate experiences in other nursing homes, so she is hoping patient's experience at [facility] is a good one.     Discussed transportation options to get to the TCU at [facility].  Wife not certain if patient will be able to tolerate sitting in a wheelchair due to his current medical condition.  Wife stated that patient may need stretcher transport and that patient has used [facility] in the past for this.  Care Transitions will continue to assess for transport needs.

## 2019-04-25 NOTE — PLAN OF CARE
Patient is alert and oriented to himself and place however intermittently confused with time and situation. Lives at home with wife and has PCA to help with other care needs. Has severe left sided weakness with contractures from previous CVA. Admitted to the intensive care unit 4/21 with sepsis shock likely from colitis and/or UTI per MD notes.     VSS. Tmax 99.3 orally this afternoon; now afebrile. Levophed turned off around 1230 this afternoon; blood pressures stable with MAPs >65. Tele SR with ST depression without ectopy; noted this in previous DEE strips however informed Dr. Smyth- no new orders at this time. ECHO with EF 55-60% Noted to have moderate LV hypertrophy and early diastolic dysfunction and mildly dilated ascending aorta. Mitral and tricuspid valve regurgitation noted as well as trace amount of pulmonic regurgitation per report. CVP monitoring with right triple lumen internal jugular central line ranges 8-17. Potassium 3.8 this morning, replaced with recheck tomorrow morning.     Lungs clear but diminished throughout. Requested home CPAP machine intermittently while napping; has 1L bled in with oxygen saturations in mid to low 90s. No complaints on shortness or breath. Infrequent weak cough at times; encouraged deep breathing.     Continues on IV zosyn every 6 hours as well as Vanco every 48 hours. Procalcitonin was 5.23 this morning. Creatinine 1.35 with GRF 50. Continues on NaCL 0.45% with 50mEq of Sodium Bicarbonate infusing at 150 ml/hr. Dependent +2-3 edema noted throughout.     Blood glucose checks every 2 hours (144, 141, 130, 171); titrating insulin infusion per Algorithm 1. Increased from 0.5 units/hour to 1 unit/hour at 2200. Tolerating clear liquid diet with fair amount of beef/chicken broth tonight. Patient it total assist with feeding. Has PRN Novolog 2 units per carb unit with any meals or snacks with carbs. Had some complaints late afternoon/early evening of intermittent nausea however  relieved with PRN IV Zofran.    Abdomen obese, rounded. No complaints of tenderness. Bowel sounds active with 2 incontinent loose mucous-like stool this evening (small & medium) light brown in color. Barrier cream applied as coccyx and anal area have blanchable redness. Cast catheter in place for strict I&Os; urine camacho in color. Catheter cares with wipes completed.     Patient turned and repositioned with assist of 1-3 with mechanical lift depending on cares needed at the time.     Will continue to monitor.

## 2019-04-25 NOTE — PLAN OF CARE
Patient had Potasium replaced per high potasium protocol ordered by MD. Performed new orders by MD: discontinued CVP monitoring and continuous insulin infusion and melo catheter.  Patient had one medium loose/liquid brown mucous stool with no blood this shift. Breath sounds clear diminished. Patient is alert and orientated to person, place, and situation but not time. Patient was washed, turned, repositioned. Edematous in scrotum and lower extremities. Tolerating full liquid diet. ABD is obese and rounded with normoactive bowel sounds, however, tender upon palpitation in middle to left upper and lower quadrants. Will cont to monitor.

## 2019-04-25 NOTE — PLAN OF CARE
Patient has been more alert throughout the night with some forgetfulness but otherwise has had clear mentation. He has declined the need for pain medication. Abdomen tender with palpation at times. Vitals have been stable. Levophed remains off. Insulin drip continues with sugars ranging 120-148. Patient covered with sliding scale for jello this morning. Urine is dark camacho. Pt has had 1 small/smear loose stool. Bottom and scrotum is very red and sore but blanchable. No open areas. Pt also has a area of redness on upper left cheek from cpap mask and how pt always has it placed. Pt would not allow oxygen to be put on in place of his mask for a short time to allow skin to rest. Mask frequently moved by writer to prevent further skin breakdown but pt always moves it back.

## 2019-04-25 NOTE — PROGRESS NOTES
[facility]    Medicine Progress Note - Hospitalist Service       Date of Admission:  4/21/2019  Assessment & Plan     78-year-old man with chronic medical problems admitted with suspected acute life-threatening illness.  After additional investigation, septic shock due to acute ischemic bowel and colitis possibly associated with arterial thromboembolism and paroxysmal atrial fibrillation is increasingly suspected.  He is much improved.    Principal Problem:    Ischemic colitis (H)  Assessment: Abdominal pain, diarrhea, heme positive and now grossly bloody with maroon stool, elevated lactate, and abnormal radiographic findings are all consistent with ischemic colitis, an acute thromboembolic event is suspected although no occlusion or severe stenosis of the inferior mesenteric artery was noted radiographically nor were there signs of DAVID thrombus on CT scan performed on April 21, improving with nonoperative management including bowel rest and antibiotics  Plan: Advance to full liquid diet today, continue antibiotics with Zosyn and vancomycin    Active Problems:    Septic shock (H)  Assessment: Resolving, probably due to acute ischemic colitis, negative cultures, procalcitonin significantly decreased compared with peak, has been off Levophed infusion since about noon yesterday  Plan: Continue antibiotics, anticipate transfer to floor today after discontinuation of IV insulin infusion, discontinue CVP monitoring but continue indwelling central venous catheter for now for IV access and IV medication administration      Peripheral vascular disease (H)  Assessment: Chronic and may have involvement of the abdominal vasculature based on radiographic findings on CT involving the superior mesenteric artery but did not have radiographic findings to support severe peripheral artery disease involving the inferior mesenteric artery  Plan: Holding aspirin for now because of active GI bleeding       Type 2 diabetes mellitus with complication, with long-term current use of insulin (H)  Assessment: Excellent glycemic control on continuous insulin infusion with plan to advance diet today, normally well controlled on home regimen that includes Lantus insulin 15 units in the morning and 25 units at bedtime  Plan: Restart Lantus insulin 15 units twice daily, discontinue IV insulin infusion after administering morning dose of Lantus insulin, add prandial NovoLog dose and continue NovoLog correction scale, continue to hold chronic metformin therapy      Diarrhea  Assessment: Improving and likely due to acute ischemic colitis, rectal bag discontinued yesterday  Plan: Treat ischemic colitis, no antidiarrheal medications      Metabolic acidosis, increased anion gap  Assessment: Resolved while receiving IV fluids containing bicarbonate, likely due to a combination of initial lactic acidosis and diarrheal bicarbonate losses  Plan: Decrease IV fluids from 150 mL/h to 75 mL/h today and follow blood gases as long as blood pressure remains stable, may be able to discontinue bicarbonate and IV fluids if blood gases remain normal      Acute kidney injury (H)  Assessment: Resolving, due to septic shock, good urine output  Plan: Continue Cast catheter to monitor urine output closely but anticipate discontinuation of Cast catheter prior to discharge for trial of spontaneous voiding, continue to monitor renal function      Hematochezia  Assessment: Transition from her black stool to dark maroon stool yesterday consistent with hematochezia from ischemic colitis, small volume and hemoglobin has been relatively stable at 10 although slightly lower this morning  Plan: Continue to follow hemoglobin until stabilizing, transfuse for hemoglobin less than 8 or active bleeding, not restarting previous chronic aspirin therapy at this time      Paroxysmal atrial fibrillation (H) with aberrant conduction  Assessment: Frequent PVCs with short  runs of wide-complex tachycardia with irregular RR interval for about 7 hours yesterday morning from 4 AM to 11 AM with review of telemetry strips most consistent with paroxysmal atrial fibrillation and aberrant conduction, no previous known history of atrial fibrillation and has not had a prolonged atrial fibrillation during this hospital stay, no signs of intracardiac thrombus on transthoracic echocardiogram although difficult to completely exclude atrial thrombus, not symptomatic from atrial fibrillation and it may have been exacerbated by hypopotassemia yesterday  Plan: Provide supplemental potassium to keep potassium 4 or higher and supplemental magnesium to keep magnesium higher than 2, continue telemetry monitoring after transfer to floor and consider outpatient prolonged cardiac monitoring such as ZIO Patch after discharge, would not start prophylactic anticoagulation for atrial fibrillation at this time because his only known episode of atrial fibrillation lasting only 7 hours during this hospital stay and because he has had active GI bleeding but would reconsider prophylactic anticoagulation as an outpatient once GI bleeding subsides if cardiac monitoring demonstrates ongoing episodes of paroxysmal atrial fibrillation because of higher clinical suspicion now for possible arterial thromboembolism causing acute ischemic bowel and septic shock      Hypomagnesemia  Assessment: New today with magnesium 1.5 probably due to diarrheal losses and little oral intake so far  Plan: Replace magnesium according to protocol      Anemia due to blood loss, acute - admit hgb 14.8  Assessment: Hemoglobin 9.6 today, slightly decreased from 10 yesterday, blood evident in the stool although this appears old rather than fresh  Plan: Serial hemoglobin monitoring      MIO (obstructive sleep apnea)  Assessment: Chronic and stable on CPAP  Plan: May use home CPAP      Hemiparesis of left nondominant side as late effect of cerebral  infarction (H)  Assessment: Chronic  Plan: No acute interventions      History of CVA (cerebrovascular accident) wit left hemiparesis  Assessment: Chronic  Plan: PT evaluation and treatment to assist with disposition planning      Essential hypertension with goal blood pressure less than 140/90  Assessment: Holding chronic antihypertensive medications including lisinopril and hydrochlorothiazide as well as chronic Lasix therapy because of septic shock  Plan: Reassess whether to restart any of his previous chronic antihypertensive medications prior to discharge depending upon his clinical course      Obesity, morbid, BMI 40.0-49.9 (H)  Assessment: Chronic  Plan: No acute interventions      Unresponsive episode  Assessment: Episode at home probably due to syncope from hypotension, no seizure activity during his hospital stay and appears to have returned to baseline mentation  Plan: Continue present treatments as above      Encephalomalacia with old cerebral infarction (H) right MCA territory  Assessment: Chronic without overt seizure activity or apparent new focal neurologic deficits  Plan: No changes      Aphasia as late effect of stroke  Assessment: Chronic and stable  Plan: No changes      Wheelchair bound  Assessment: Chronic but likely will be even weaker than usual as he recovers from this severe acute illness  Plan: PT to evaluate, possible discharge to TCU for rehab      Leukocytosis  Assessment: Due to ischemic bowel and septic shock  Plan: Follow WBC    Diet: Combination Diet Clear Liquid; 2987-3396 Calories: Moderate Consistent CHO (4-6 CHO units/meal)    DVT Prophylaxis: Enoxaparin (Lovenox) SQ  Cast Catheter: in place, indication: Strict 1-2 Hour I&O  Code Status: DNR/DNI      Disposition Plan   Expected discharge: 2 to 5 days, recommended to transitional care unit once Medically stable and he has a safe disposition plan.  Entered: Celso Dewitt MD 04/25/2019, 9:14 AM       The patient's care was  discussed with the Bedside Nurse and Patient.    Celso Dewitt MD  Hospitalist Service  [facility]    ______________________________________________________________________    Interval History   Patient has not complained of abdominal pain today.  He tolerated clear liquids yesterday without any difficulty often requesting more clear liquids then came with his meals.  He has not been vomiting.  Diarrhea is slowing and is of lower volume.  Diarrhea turned from a dark or black color to a maroon color yesterday but he has not had any bright red blood.  His rectal bag was removed yesterday.  Levophed infusion was discontinued just after noon yesterday and his blood pressure has remained stable.  Heart rate has been normal and he has not had any recurrent episodes of dysrhythmias.  He uses his CPAP during sleeping and is maintaining adequate oxygenation.  Nursing reports that he has been oriented and more responsive in the last day.    Data reviewed today: I reviewed all medications, new labs and imaging results over the last 24 hours. I personally reviewed telemetry which has not demonstrated any recurrent dysrhythmias over the last day since about noon yesterday.    Physical Exam   Vital Signs: Temp: 98.9  F (37.2  C) Temp src: Oral BP: 120/51 Pulse: 68 Heart Rate: 71 Resp: 18 SpO2: 93 % O2 Device: BiPAP/CPAP(Home cpap) Oxygen Delivery: 2 LPM CVP running 6-12 today, urine output 1185 mL over the last day, intake exceeds output by 14.9 L during his hospital stay although this does not account for diarrheal losses  Weight: 292 lbs 8.81 oz  General Appearance: Morbidly obese, no acute distress resting in bed  Respiratory: Diminished breath sounds throughout, clear lungs  Cardiovascular: Regular rate and rhythm, palpable radial pulse, brisk capillary refill  GI: Hypoactive bowel sounds, somewhat tympanitic, soft abdomen, no apparent tenderness, no involuntary guarding  Skin: No rashes      Data   Recent Labs   Lab 04/25/19  0515 04/24/19  1815 04/24/19  0435 04/23/19  2350  04/23/19  0510  04/22/19  1830  04/22/19  0615  04/21/19  1440   WBC 7.8  --   --   --   --  12.5*  --  19.8*  --  27.0*  --  13.7*   HGB 9.6* 10.0* 10.8* 10.3*   < > 9.9*   < > 11.4*   < > 11.8*  --  14.8   MCV  --   --   --   --   --  94  --  93  --  93  --  94   PLT  --   --  266  --   --  172  --  207  --  271  --  283   INR  --   --   --   --   --   --   --   --   --   --   --  1.24*     --  144 143   < > 144  --  143   < > 140   < > 138   POTASSIUM 3.1*  --  3.8 3.3*   < > 3.5  --  4.1   < > 4.7   < > 4.3   CHLORIDE 110*  --  110* 109   < > 110*  --  109   < > 108   < > 103   CO2 26  --  23 23   < > 24  --  24   < > 24   < > 19*   BUN 16  --  25 26   < > 31*  --  31*   < > 30   < > 28   CR 1.07  --  1.35* 1.38*   < > 1.80*  --  1.73*   < > 1.52*   < > 1.02   ANIONGAP 7  --  11 11   < > 10  --  10   < > 8   < > 16*   MAKENNA 7.4*  --  7.6* 7.3*   < > 7.4*  --  7.8*   < > 8.1*   < > 9.6   *  --  138* 119*   < > 104*  --  113*   < > 120*   < > 236*   ALBUMIN  --   --  2.2*  --   --  2.1*  --   --   --  2.6*  --  3.5   PROTTOTAL  --   --  5.7*  --   --  5.2*  --   --   --  6.0*  --  8.0   BILITOTAL  --   --  0.5  --   --  0.5  --   --   --  0.6  --  0.6   ALKPHOS  --   --  63  --   --  57  --   --   --  68  --  109   ALT  --   --  10  --   --  10  --   --   --  15  --  20   AST  --   --  8  --   --  13  --   --   --  22  --  22   TROPI  --   --   --   --   --   --   --   --   --   --   --  <0.015    < > = values in this interval not displayed.     Blood cultures are negative to date, procalcitonin today is pending    Lactic acid at 3 AM today was 0.9    Venous blood gas today pH 7.40, PCO2 46, bicarbonate 28    Blood sugars have ranged 124-158 over the last day    Medications     insulin (regular) 0.5 Units/hr (04/25/19 0848)     IV infusion builder WITH additives 150 mL/hr at 04/25/19 0849     norepinephrine Stopped  (04/24/19 1518)     sodium chloride 10 mL/hr at 04/25/19 0849       enoxaparin  40 mg Subcutaneous Q24H     heparin lock flush  5-10 mL Intracatheter Q24H     pantoprazole (PROTONIX) IV  40 mg Intravenous Daily with breakfast     piperacillin-tazobactam  3.375 g Intravenous Q6H     sodium chloride (PF)  3 mL Intravenous Q8H     vancomycin (VANCOCIN) IV  2,000 mg Intravenous Q48H

## 2019-04-26 NOTE — PROGRESS NOTES
[facility]    Medicine Progress Note - Hospitalist Service       Date of Admission:  4/21/2019  Assessment & Plan     Patient is a 78-year-old man admitted to the ICU on 4/21/19 with chronic medical problems admitted with suspected acute life-threatening illness requiring pressor support, initially of unclear etiology however with further investigation septic shock due to acute ischemic bowel and colitis is suspected.  He is much improved and was transitioned from the ICU yesterday with ongoing clinical improvement.  His appetite has returned and patient is eating/drinking very well.       Principal Problem:    Ischemic colitis (H)  Assessment: Abdominal pain, diarrhea, positive occult stool associated with initially grossly bloody and now maroon stools, elevated lactate, and abnormal radiographic findings are all consistent with ischemic colitis.  An acute thromboembolic event is probably, however no occlusion or severe stenosis of the inferior mesenteric artery was noted on CT scan.  Infectious work up with enteric stool testing was negative.  Patient improved with bowel rest and antibiotics (Zosyn and Vancomycin) tolerated advancement of his diet to full liquids without difficulty.    Plan:  Speech therapy to the evaluate the patient today and reevaluate safe diet going forward given his initial encephalopathy and underlying CVA.  We will continue to advance towards regular diet as appropriate.  Will transition patient to Augmentin and Flagyl for ongoing antibiotic coverage of intra-abdominal infection suspicion and monitor for ongoing clinical improvement.  We will recheck procalcitonin 24 hours following transition to oral antibiotics to ensure ongoing resolution.  Possible discharge to TCU in 2 days if patient continues to improve     Active Problems:    Septic shock (H)  Assessment: Resolving, thought secondary to acute ischemic colitis, as above.  Cultures have been negative,  procalcitonin continue to decrease and patient is clinically improving as above    Plan: Transition to PO antibiotics and monitor for ongoing resolution of sepsis symptoms as above       Peripheral vascular disease (H)  Assessment: Chronic and stable  Plan: Continue to hold aspirin for now because of active GI bleed       Type 2 diabetes mellitus with complication, with long-term current use of insulin (H)  Assessment: Patient was on an insulin drip initially, transitioned to subcutaneous insulin yesterday at lower than normal home regimen (getting Lantus 15 units BID and home dose is 15 units in the morning and 25 units at bedtime.  Patient's oral intake continues to improve.  Blood sugars have been  today  Plan: Continue with Lantus insulin 15 units twice daily as well as mealtime Novolog 2 units per carb and ongoing NovoLog correction scale.  Continue to hold home metformin for now       Diarrhea  Assessment: Secondary to ischemic colitis and mproving   Plan: Treat ischemic colitis as above, no antidiarrheal medications       Metabolic acidosis, increased anion gap  Assessment: present initially but resolved following bicarb containing IV fluids, thought secondary due to a combination of initial lactic acidosis and bicarb loss form diarrhea  Plan: Will saline lock IV fluids and monitor for ongoing stability.         Acute kidney injury (H)  Assessment:  Secondary to septic shock, now resolving with creatinine back at baseline  Plan:  will remove Cast catheter saline lock IV fluids and monitor for ongoing stability,        Hematochezia  Assessment: Was initially grossly bloody, then black stool to dark maroon stool and now nursing is reporting brown normal appearance beginning, consistent with hematochezia from ischemic colitis.  Hemoglobin has stabilized in the low 10 range without signs of ongoing blood loss.    Plan:  Continue to monitor for ongoing resolution, will recheck hemoglobin tomorrow to ensure  stability       Paroxysmal atrial fibrillation (H) with aberrant conduction  Assessment: Patient has been noted to have frequent PVCs with short runs of wide-complex tachycardia with irregular RR interval for about 7 hours on 4/25/19 with review of telemetry strips most consistent with paroxysmal atrial fibrillation and aberrant conduction.  Patient has no previous known history of atrial fibrillation and has not had a prolonged atrial fibrillation during this hospital stay. Patient has not had any recurrence of the atrial fibrillation since spontaneous resolution.    Plan:  We will continue to monitor heart rate via telemetry.  Continue to attempt to keep potassium 4 or higher and magnesium 2 or higher.  Plan is to perform 0 patch shortly after discharge in order to further monitor patient's rhythm and evaluate for paroxysmal atrial fibrillation and need for possible long-term coagulation.  For now, given patient's current GI bleeding, no anticoagulation is recommended        Hypomagnesemia  Assessment:  Discovered following patient's atrial fibrillation rhythm when this value was assessed.  Magnesium improving with supplementation and is 2.2 today  Plan: Replace magnesium according to protocol       Anemia due to blood loss, acute - admit hgb 14.8  Assessment: Hemoglobin has stabilized in the low 10 range secondary to GI bleed as above  Plan:  We will recheck hemoglobin tomorrow to ensure ongoing stability or sooner if patient has any concerns for active bleeding       MIO (obstructive sleep apnea)  Assessment: Chronic and stable on CPAP  Plan: continue home CPAP       Hemiparesis of left nondominant side as late effect of cerebral infarction (H)  Assessment: Chronic  Plan: No acute interventions       History of CVA (cerebrovascular accident) wit left hemiparesis  Assessment: Chronic  Plan: Discharge to TCU at time of discharge for ongoing strengthening and hopeful return to previous baseline        Essential  hypertension with goal blood pressure less than 140/90  Assessment: Home blood pressure medications of lisinopril and hydrochlorothiazide and Lasix have been held secondary to septic shock.  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 infarction (H) right MCA territory  Assessment: Chronic without overt seizure activity or apparent new focal neurologic deficits  Plan: No changes       Aphasia as late effect of stroke  Assessment: Chronic and stable  Plan: No changes       Wheelchair bound  Assessment: Chronic but likely will be even weaker than usual as he recovers from this severe acute illness  Plan: PT has evaluated and patient will need TCU at time of discharge           Diet: Combination Diet Full Liquid; 2837-8579 Calories: Moderate Consistent CHO (4-6 CHO units/meal)    DVT Prophylaxis: Pneumatic Compression Devices  Cast Catheter: not present  Code Status: DNR/DNI      Disposition Plan   Expected discharge: 2-3 days, recommended to transitional care unit once adequate pain management/ tolerating PO medications, antibiotic plan established, safe disposition plan/ TCU bed available, SIRS/Sepsis treated and renal funciton remains stable off IV fluids.  Entered: Elena Palomares MD 04/26/2019, 7:54 AM       The patient's care was discussed with the Bedside Nurse, Patient and Patient's Family.    Elena Palomares MD  Hospitalist  Service  [facility]    ______________________________________________________________________    Interval History   Patient continues to improve, is very alert and hungry today and has been having snacks frequently.  Continues to have diarrhea but is now light brown in color with no further maroon blood noted.  Patient continues to have mild lower abdominal pain but reports that it is improving every day.  No new concerns.  Blood pressures continue to be in low normal range.  Tolerating medications well without significant side effects    Data reviewed today: I reviewed all medications, new labs and imaging results over the last 24 hours.    Physical Exam   Vital Signs: Temp: 99.2  F (37.3  C) Temp src: Oral BP: 122/53 Pulse: 70 Heart Rate: 77 Resp: 18 SpO2: 94 % O2 Device: None (Room air) Oxygen Delivery: 2 LPM  Weight: 296 lbs 4.77 oz  Constitutional: awake, alert, cooperative, no apparent distress, ongoing left-sided hemiparesis as per patient's baseline, and appears stated age  Respiratory: No increased work of breathing, good air exchange, clear to auscultation bilaterally, no crackles or wheezing  Cardiovascular: Normal apical impulse, regular rate and rhythm  GI: Bowel sounds present, abdomen is obese but soft.  Patient does have mild tenderness on palpation of the left lower quadrant but much improved compared to 3 days ago when abdomen was last examined  Genitounirinary: Cast catheter is in place  Musculoskeletal: Left-sided hemiparesis is noted, which is patient's baseline  Neurologic: Patient is alert, awake, oriented to person and place and somewhat to time and situation.  Answers questions in a rapid yes or no fashion, which apparently is his baseline following his CVA.  Answers are not always appropriate which apparently is also his baseline    Data   Recent Labs   Lab 04/26/19  0604 04/25/19  2253 04/25/19  1610 04/25/19  1440 04/25/19  0945 04/25/19  0515   04/24/19  0435  04/23/19  0510  04/22/19  1830  04/22/19  0615  04/21/19  1440   WBC  --   --   --   --   --  7.8  --   --   --  12.5*  --  19.8*  --  27.0*  --  13.7*   HGB 10.2* 10.4*  --  9.9*  --  9.6*   < > 10.8*   < > 9.9*   < > 11.4*   < > 11.8*  --  14.8   MCV  --   --   --   --   --   --   --   --   --  94  --  93  --  93  --  94   PLT  --   --   --   --   --   --   --  266  --  172  --  207  --  271  --  283   INR  --   --   --   --   --   --   --   --   --   --   --   --   --   --   --  1.24*   *  --   --   --   --  143  --  144   < > 144  --  143   < > 140   < > 138   POTASSIUM 3.6  --  3.6  --  3.3* 3.1*  --  3.8   < > 3.5  --  4.1   < > 4.7   < > 4.3   CHLORIDE 111*  --   --   --   --  110*  --  110*   < > 110*  --  109   < > 108   < > 103   CO2 27  --   --   --   --  26  --  23   < > 24  --  24   < > 24   < > 19*   BUN 12  --   --   --   --  16  --  25   < > 31*  --  31*   < > 30   < > 28   CR 0.93  --   --   --   --  1.07  --  1.35*   < > 1.80*  --  1.73*   < > 1.52*   < > 1.02   ANIONGAP 7  --   --   --   --  7  --  11   < > 10  --  10   < > 8   < > 16*   MAKENNA 7.4*  --   --   --   --  7.4*  --  7.6*   < > 7.4*  --  7.8*   < > 8.1*   < > 9.6   GLC 91  --   --   --   --  148*  --  138*   < > 104*  --  113*   < > 120*   < > 236*   ALBUMIN  --   --   --   --   --   --   --  2.2*  --  2.1*  --   --   --  2.6*  --  3.5   PROTTOTAL  --   --   --   --   --   --   --  5.7*  --  5.2*  --   --   --  6.0*  --  8.0   BILITOTAL  --   --   --   --   --   --   --  0.5  --  0.5  --   --   --  0.6  --  0.6   ALKPHOS  --   --   --   --   --   --   --  63  --  57  --   --   --  68  --  109   ALT  --   --   --   --   --   --   --  10  --  10  --   --   --  15  --  20   AST  --   --   --   --   --   --   --  8  --  13  --   --   --  22  --  22   TROPI  --   --   --   --   --   --   --   --   --   --   --   --   --   --   --  <0.015    < > = values in this interval not displayed.

## 2019-04-26 NOTE — PROGRESS NOTES
"CLINICAL NUTRITION SERVICES  -  ASSESSMENT NOTE    RECOMMENDATIONS FOR MD/PROVIDER TO ORDER: None   Recommendations Ordered by Registered Dietitian (RD): Boost supplement at 2 PM daily, and prn if patient intake is low at meals.   Future/Additional Recommendations: Continue to monitor and encourage po intake   Malnutrition: Patient does not meet two of the above criteria necessary for diagnosing malnutrition       REASON FOR ASSESSMENT  Derek Aragon is a 78 year old male seen by Registered Dietitian for LOS       NUTRITION HISTORY  Pt admitted for stroke like symptoms, found to be in septic shock, diagnosed with Ischemic colitis, septic shock, hematochezia, and LINDSAY. Was in ICU, transferred to floor yesterday.    PMH:  Left hemiparesis, WC bound, Type 2 DM on insulin, HTN, PVD, history of CVA, aphasia, obesity morbid BMI >40,   - Information obtained from EMR and patient/patient's wife.   - Diet history: Pt is home with wife, she does all the cooking and pt's cares. She reports patients tastes have changed drastically since stroke ~7 years ago, refusing many of his previously favorite foods. She gives him Boost if he is not hungry for a meal.   Typical intake:   B: \"largest meal\" of cold cereal OR cream of wheat with raisins OR oatmeal. Sides of fruit and juice.   L: spaghetti or lasagna, if pt not hungry, wife gives him Boost  D: Pasta, grilled cheese and soup.   Snacks only occasionally on sugar free candy or popcorn.  Drinks: \"plenty of water\" as well as diet coke with some meals.   No food intolerances reported.     Blood sugar checks at home by wife, range . Rarely gets lows or highs. HgbA1c 4/21/19 is 6.2. 6.4 on 10/2018 showing good control. Pt is on Lantus 15 AM and 25 PM at home.       CURRENT NUTRITION ORDERS  Diet Order:     Moderate Consistent Carbohydrate and Full liquid    Pt NPO at admission 4/21 for ischemic colitis. 4 days NPO.   Advanced to Clear liquids 4/24 4/25 advanced to Full " "liquids.    Current Intake/Tolerance:   Tolerated 25% breakfast and 100% dinner. No lunch charted. 540 mL po intake charted in total.   Today, patient tolerating full liquids well, reports good appetite. Frequent requests for pudding and ice cream.     SLP consult in as s/p CVA in past, current septic shock with improving alertness but want to assess for safest diet going forward      NUTRITION FOCUSED PHYSICAL ASSESSMENT (NFPA)  Completed:  Yes Visual assessment only         Observed:    No nutrition-related physical findings observed    Obtained from Chart/Interdisciplinary Team:   2+ Generalized edema (mild) and 3+ scrotum edema (moderate)    ANTHROPOMETRICS  Height: 5' 9\"  Weight: 296 lbs 4.77 oz  Body mass index is 43.76 kg/m .  Weight Status:  Obesity Grade III BMI >40  IBW: 188 lb (ABW)  % IBW: 145%    Weight History:   Wt Readings from Last 3 Encounters:   04/26/19 134.4 kg (296 lb 4.8 oz)   06/01/18 127.5 kg (281 lb)   01/19/18 125.2 kg (276 lb)   Weight history shows admit weight of 124 kg stable over last 3 months. Currently fluid up +16.6L  Pt's wife reports UBW of 270 lbs, stable over time despite taste changes. No recent weight change.     LABS  Hgb A1c 6.2 at admission 4/21. Good control chronically per 6.4 HgbA1c taken 10/2018.   Pt's BG at admit high at 236. Has been stable 100-181 since admission, no lows. However pt just starting to eat again.   Na: 145. K 3.6 (replacing). Creatinine 0.93. BUN 12. Hgb admit 14.8---down to 10.2 4/26 due to blood loss and hemodilution.     MEDICATIONS  15 units Glargine AM and PM, and sliding scale insulin at meals & PM. Insulin drip discontinued at move from ICU.   NacL/bicarb running 75 mL/hr.   Mag and Potassium replacing prn.   Pt required Zofran 2/24.       ASSESSED NUTRITION NEEDS PER APPROVED PRACTICE GUIDELINES:    Dosing Weight: Most accurate is 124 kg admit weight. Will use adjusted BW of 85.5 kg.   Estimated Energy Needs: 5083-2448 kcals (25-30 Kcal/Kg " based on Adjusted BW)  Justification: maintenance  Estimated Protein Needs: 68-86 grams protein (0.8-1 g pro/Kg adjusted BW)  Justification: maintenance  Estimated Fluid Needs: 9223-0614  mL (1 mL/Kcal)  Justification: maintenance    MALNUTRITION:  % Weight Loss:  None noted  % Intake:  </= 50% for >/= 5 days (severe malnutrition)  Subcutaneous Fat Loss:  None observed  Muscle Loss:  None observed  Fluid Retention:  Moderate (3+ scrotum edema) and Mild (2+ generalized edema). Cannot use as malnutrition indicator Due to IV fluids & shock causing fluid retention, not nutrition reasons. .     Malnutrition Diagnosis: Patient does not meet two of the above criteria necessary for diagnosing malnutrition    NUTRITION DIAGNOSIS:  Inadequate oral intake related to acute illness as evidenced by NPO 4 days, diet limited to liquids due to ischemic colitis diagnosis.       NUTRITION INTERVENTIONS  Recommendations / Nutrition Prescription  -Boost supplement at 2 PM daily, and prn if patient intake is low at meals.   -Continue to monitor and encourage po intake    Implementation  Nutrition education: No education needs assessed at this time  Medical Food Supplement and Collaboration and Referral of Nutrition care  .      Nutrition Goals  -Intake 75% or greater of meals  -Consume 1 or more Boost/day while on Full liquid diet      MONITORING AND EVALUATION:  Progress towards goals will be monitored and evaluated per protocol and Practice Guidelines, Food intake and Liquid meal replacement or supplement    Dany Carroll RDN, LD  Clinical Dietitian Nutritionist

## 2019-04-26 NOTE — PROGRESS NOTES
"   04/26/19 1500   General Information   Onset Date 04/26/19   Start of Care Date 04/21/19   Referring Physician Dr. Elena Palomares   Patient Profile Review/OT: Additional Occupational Profile Info See Profile for full history and prior level of function   Patient/Family Goals Statement \"I want to eat regular food\"   Swallowing Evaluation Bedside swallow evaluation   Behaviorial Observations WFL (within functional limits)   Mode of current nutrition Oral diet   Type of oral diet Other (Comment)  (Full liquid diet)   Respiratory Status Room air   Clinical Swallow Evaluation   Structural Abnormalities none present   Dentition upper and lower dentures   Mucosal Quality good   Mandibular Strength and Mobility intact   Oral Labial Strength and Mobility WFL   Lingual Strength and Mobility WFL   Velar Elevation intact   Buccal Strength and Mobility impaired   Laryngeal Function Voicing initiated;Dry swallow palpated;Cough;Throat clear;Swallow   Additional Documentation Yes   Additional evaluation(s) completed today No   Swallow Eval   Feeding Assistance set up only required   Clinical Swallow Eval: Thin Liquid Texture Trial   Mode of Presentation, Thin Liquids cup;straw;self-fed   Volume of Liquid or Food Presented 6 ounces   Oral Phase of Swallow WFL   Pharyngeal Phase of Swallow intact   Diagnostic Statement No overt s/sx of penetration/aspiration. One dry cough noted; hover, did not apear to be wet or reflexive in nature.   Clinical Swallow Eval: Semisolid Texture Trial   Mode of Presentation, Semisolid spoon;self-fed   Volume of Semisolid Food Presented 3 ounces   Oral Phase, Semisolid WFL   Pharyngeal Phase, Semisolid intact   Diagnostic Statement No overt s/sx of penetration/aspiration with semisolids.   Clinical Swallow Eval: Solid Food Texture Trial   Mode of Presentation, Solid self-fed   Volume of Solid Food Presented 1 cracker   Oral Phase, Solid WFL   Oral Residue, Solid other (see comments)  (mild oral " residue, cleared with liquid wash.)   Pharyngeal Phase, Solid intact   Diagnostic Statement No overt s/sx of penetration/aspiration with solids.   Swallow Eval: Clinical Impressions   Skilled Criteria for Therapy Intervention No problems identified which require skilled intervention   Functional Assessment Scale (FAS) 7   Dysphagia Outcome Severity Scale (ABISAI) Level 7 - ABISAI   Treatment Diagnosis Functional oropharyngeal swallow   Diet texture recommendations Regular diet;Thin liquids   Recommended Feeding/Eating Techniques maintain upright posture during/after eating for 30 mins   Risks and Benefits of Treatment have been explained. Yes   Patient, family and/or staff in agreement with Plan of Care Yes   Clinical Impression Comments Functional oropharyngeal swallow characterized by adequate mastication and oral transit and timely pharyngeal response. One instance of coughing did not appear reflexive or wet in naturew. No overt s/sx of penetration or aspiration. Patient appears appropriate to advance to a regular diet with thin liquids. Prognosis is good for safe PO intake.   Total Evaluation Time   Total Evaluation Time (Minutes) 25     Thank you for this referral!    Demetra Perez MA, CF-SLP  [facility]  [phone]

## 2019-04-26 NOTE — PLAN OF CARE
Patient BG are below 200. Tolerating full liquid diet. Having liquid bile colored stools. Patient is in content of urine since melo catheter is removed. Turned and reposioned every 2-3 hours. New moisture fissure at buttocks fold near rectum. Applied barrier cream.

## 2019-04-26 NOTE — PROGRESS NOTES
Care Transitions continues to work with patient and wife on discharge planning.  Writer has spoken with patient's bedside nurse, Faviola, for today.  She is feeling that patient will be able to tolerate a wheelchair transport to [facility].      Writer has called [facility] transportation about possible transport over the weekend.  They are not available for stretcher transport and only available Saturday at 4pm for wheelchair transport (which is too late of a time for [facility] to admit).    Writer has called [facility] transport.  They offer stretcher and wheelchair transport, but do not contract with patient's insurance.      Writer called [facility] [phone].  They are available for wheelchair transport on Saturday or Sunday. Discussed patient's current weight as 296 lbs.      Writer has left a voicemail with patient's wife, Mattie.  Care Transitions to continue to follow for discharge planning.      5916- Writer just found out that anticipated discharge date is Sunday or Monday.      1622- Writer has left a message with patient's , Jossy Cortes regarding TCU placement.

## 2019-04-26 NOTE — PROGRESS NOTES
S- Transfer to [facility] from ICU..    B- Patient admitted with stroke like symptoms. Stroke was ruled out. Determined patient had septic shock .    A- Brief systems assessment: Cardiac: currently NSR, previous days had intermittent Afib. Pulmonary: breath sounds clear diminished. GI/: ABD obese, tender, rounded with active bowel sounds. Urine is now yellow. Stool is brown and loose. VSS; patient's BP were hypotensive but have stabilized..     R- Transfer to medical floor per physician orders. Continue to monitor pt and update physician as needed. yes     Code status: DNR / DNI  Skin: Skin tear and excoriation around rectum, red blanchable in groin and abd folds.  Fall Risk: Yes- Department fall risk interventions implemented.  Isolation: None  Core Measures: DVT: lovenox.  Medication drips upon transfer: Sodium bicarbonate. Potassium.

## 2019-04-26 NOTE — PROGRESS NOTES
VSS. Afebrile.  Large loose incontinent brown BM and x1 large wet pad. Patient denied pain-repositioned q3hrs. Extensive Assistance of 3 staff members with repo's and changing. .

## 2019-04-26 NOTE — DISCHARGE INSTRUCTIONS
Recommend Boost (chocolate or vanilla) once daily and prn when appetite is poor.      Dany Carroll, YOKASTAN, LD

## 2019-04-27 NOTE — PROGRESS NOTES
Pt not medically stable to discharge until Monday due to fever.  [facility] notified of delay in discharge.  Wife also notified of delay in discharge she will be up later to bring pt's wheelchair.     Ludmila Pina BSN, RN, PHN  RN Care Coordinator  Care Transitions Department  [facility] [phone]  [facility] [phone]

## 2019-04-27 NOTE — PLAN OF CARE
Patient has not reported any pain tonight. He has had 3 large loose stools and has been incontinent of large amounts of urine. Temp overnight of 101.8. Recheck later 98.8. Other vitals have been stable and pt has not needed oxygen through his cpap tonight. Tele has been SR with occasional pac's. Bottom and scrotum is very tender and sore from frequent incontinence.

## 2019-04-27 NOTE — PROGRESS NOTES
DATE:  4/27/2019   TIME OF RECEIPT FROM LAB: 219pm  LAB TEST:  Lactic   LAB VALUE:  2.2  RESULTS GIVEN WITH READ-BACK TO (PROVIDER):  Elena Palomares*  TIME LAB VALUE REPORTED TO PROVIDER:   219pm

## 2019-04-27 NOTE — PROGRESS NOTES
Discharge Planner   Discharge Plans in progress: Discharge to [facility], if able to be in wheelchair can go [facility], if not will go by [facility]in [facility].  Barriers to discharge plan: medically stable  Follow up plan: TCU       Entered by: Doreen Pina 04/27/2019 10:21 AM

## 2019-04-27 NOTE — PROGRESS NOTES
[facility]    Medicine Progress Note - Hospitalist Service       Date of Admission:  4/21/2019  Assessment & Plan        Patient is a 78-year-old man admitted to the ICU on 4/21/19 with chronic medical problems admitted with acute life-threatening illness requiring pressor support, initially of unclear etiology however with further investigation septic shock due to acute ischemic bowel and colitis is suspected.  He improved with supportive cares including Zosyn and Vanco and was transitioned from the ICU on 4/25/19 with ongoing clinical improvement noted and patient was transitioned to PO Augmentin and Flagyl yesterday evening.  Patient did have a recurrent fever up to 101.8 F last evening of unclear significance following being afebrile for over 48 hours but no other change in medical status has been noted.  Resolved following tylenol x1 and patient is afebrile the remainder of the night and so far this morning.       Principal Problem:    Ischemic colitis (H)  Assessment: Abdominal pain, diarrhea, positive occult stool associated with initially grossly bloody (with subsequent maroon stools which are now resolving), elevated lactate, and CT scan consistent with colitic, with clinical picture most consistent with ischemic colitis.  An acute thromboembolic event is possible however no occlusion or severe stenosis of the inferior mesenteric artery was noted on CT scan.  Infectious work up with enteric stool testing was negative.  Patient improved with bowel rest and antibiotics (Zosyn and Vancomycin) and has tolerated advancement of his diet to regular without difficulty and was transitioned to PO Augmentin and Flagyl last evening.  Now had fever of 101.8 overnight as above  Plan:   Will perform repeat blood cultures, lactic acid, WBC count and monitor for signs of worsening infection in light of recurrent fever as above.  Procalcitonin has been drawn this morning and will monitor for  ongoing improvement of this following transition to PO antibiotics.  Continue Augmentin and Flagyl for ongoing antibiotic coverage of intra-abdominal infection suspicion.  Possible discharge to TCU in 2 days if patient continues to improve with PO antibiotics and remains afebrile.       Active Problems:    Septic shock (H)  Assessment: Resolving,  thought secondary to acute ischemic colitis as above.  Cultures have been negative, procalcitonin continue to decrease and patient has been clinically improving apart from fever last evening as above.      Plan:  Proceed with plan as above.        Fever    Assessment:  Present initially but had been resolved for over 48 hours with return last evening with fever of 101.8 of unclear etiology.  No increased oxygen needs or work of breathing.  Patient has no other signs of clinical worsening.  Cast has been in place and was removed yesterday.      Plan:  Proceed with Augmentin and Flagyl as above but will perform blood cultures x2, lactic acid, WBC count and procalcitonin today and again tomorrow morning for further evaluation of infection.  Will also perform UA with reflux to Culture if concern for UTI is present.  No chest x-ray at this time as patient is not showing any signs of respiratory worsening but may consider going forward if respiratory symptoms present or if patient has recurrent fevers.  Will continue with current antibiotics and monitor for above results and well as any recurrent fever.        Peripheral vascular disease (H)  Assessment: Chronic  and stable  Plan:  Continue to hold aspirin for now because of active GI bleed       Type 2 diabetes mellitus with complication, with long-term current use of insulin (H)  Assessment:  Patient was on an insulin drip initially, transitioned to subcutaneous insulin yesterday at lower than normal home regimen (getting Lantus 15 units BID and home dose is 15 units in the morning and 25 units at bedtime.  Patient's oral  intake continues to improve.  Blood sugars have been  in the past 24 hours  Plan:  Continue with Lantus insulin 15 units twice daily as well as mealtime Novolog 2 units per carb and ongoing NovoLog correction scale.  Continue to hold home metformin       Diarrhea  Assessment:  Secondary to ischemic colitis and mproving   Plan: Treat ischemic colitis as above, no antidiarrheal medications        Metabolic acidosis, increased anion gap  Assessment:  present initially but resolved following bicarb containing IV fluids, thought secondary due to a combination of initial lactic acidosis and bicarb loss form diarrhea.  Fluids are now saline locked with ongoing resolution  Plan:   No further monitoring is needed       Acute kidney injury (H)  Assessment:   Secondary to septic shock, now resolving with creatinine back at baseline, continuing to be normal following discontinuation of IV fluids.   Plan:   Monitor for ongoing stability with PO intake alone       Hematochezia  Assessment:  Was initially grossly bloody, then black stool to dark maroon stool and more brown normal appearance beginning, consistent with hematochezia from ischemic colitis.  Hemoglobin has stabilized in the upper 9 to low 10 range without signs of ongoing blood loss.    Plan:   Continue to monitor for ongoing resolution, will recheck hemoglobin at 1800 and again tomorrow AM to ensure stability       Paroxysmal atrial fibrillation (H) with aberrant conduction  Assessment:  Patient was noted to have frequent PVCs with short runs of wide-complex tachycardia with irregular RR interval for about 7 hours on 4/24/19 with review of telemetry strips most consistent with paroxysmal atrial fibrillation and aberrant conduction.  Patient has no previous known history of atrial fibrillation and has not had a prolonged atrial fibrillation during this hospital stay. Patient has not had any recurrence of the atrial fibrillation since spontaneous resolution on the  24th.    Plan:   We will continue to monitor heart rate via telemetry.  Continue to attempt to keep potassium 4 or higher and magnesium 2 or higher.  Plan is to perform Zio patch shortly after discharge in order to further monitor patient's rhythm and evaluate for paroxysmal atrial fibrillation and need for possible long-term coagulation.  For now, given patient's current GI bleeding, no anticoagulation is recommended        Hypomagnesemia  Assessment:   Discovered following patient's atrial fibrillation rhythm when this value was assessed.  Magnesium improving with supplementation and is 2.3 today  Plan: Replace magnesium according to protocol       Anemia due to blood loss, acute - admit hgb 14.8  Assessment: Hemoglobin has stabilized in the upper 9 to low 10 range secondary to GI bleed as above  Plan:   Continue to monitor for hemoglobin stability as above       MIO (obstructive sleep apnea)  Assessment: Chronic and stable on CPAP  Plan: continue home CPAP       Hemiparesis of left nondominant side as late effect of cerebral infarction (H)  Assessment: Chronic  Plan: No acute interventions       History of CVA (cerebrovascular accident) wit left hemiparesis  Assessment: Chronic  Plan:  Discharge to TCU at time of discharge for ongoing strengthening and hopeful return to previous baseline        Essential hypertension with goal blood pressure less than 140/90  Assessment:  Home blood pressure medications of lisinopril and hydrochlorothiazide and Lasix have been held secondary to septic shock.  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  Assessment: Chronic without overt seizure activity or apparent new focal neurologic deficits  Plan: No changes       Aphasia as late effect of stroke  Assessment: Chronic and stable  Plan: No changes       Wheelchair bound  Assessment: Chronic but likely will be even weaker than usual as he recovers from this severe acute illness  Plan: PT  has evaluated and patient will need TCU at time of discharge         Diet: Snacks/Supplements Adult: Boost Plus; Between Meals  Combination Diet Regular Diet Adult; 5024-4000 Calories: Moderate Consistent CHO (4-6 CHO units/meal)    DVT Prophylaxis: Pneumatic Compression Devices  Cast Catheter: not present  Code Status: DNR/DNI      Disposition Plan   Expected discharge: 2 days, recommended to transitional care unit once adequate pain management/ tolerating PO medications, antibiotic plan established and patient remains afebrile with ongoing improvement with PO antibiotics, TCU bed available.  Entered: Elena Palomares MD 04/27/2019, 7:36 AM       The patient's care was discussed with the Bedside Nurse and Patient.  Will also discuss with patient's family later today    Elena Palomares MD  Hospitalist Service  [facility]    ______________________________________________________________________    Interval History   Patient developed a fever of 101.8 overnight but no other change in vitals, no clinical worsening, no other signs of acute change.  Patient's fever persisted for 4 hours and he was finally given tylenol with resolution and has remained afebrile overnight and so far this morning.  No other changes noted.  Patient still eating and drinking well when awake, feeling better.  Tolerating advancement of his  diet and initiation of PO antibiotics without difficulty.     Data reviewed today: I reviewed all medications, new labs and imaging results over the last 24 hours.    Physical Exam   Vital Signs: Temp: 98.8  F (37.1  C) Temp src: Axillary BP: 131/56 Pulse: 84 Heart Rate: 87 Resp: 20 SpO2: 95 % O2 Device: BiPAP/CPAP(Home cpap)    Weight: 302 lbs 14.59 oz  Constitutional: sleeping on entry into the room but awakens to voice and is alert, comfortable and in no acute distress, ongoing left sided hemiparesis  Respiratory: No increased work of breathing, decreased air exchange but suspected to be his baseline, clear to auscultation bilaterally, no crackles or wheezing  Cardiovascular: Normal apical impulse, regular rate and rhythm  GI: bowel sounds present, abdomen obese but soft and only mild tenderness on palpation of the LLQ noted but appears improved from yesterday  Musculoskeletal: ongoing left sided hemiparesis present, trace to 1+ edema in bilateral lower legs  Neurologic: awake, alert, oriented to person and place and somewhat to time and situation.  Continues to have some mild aphagia from previous CVA but able to answer most questions appropriately     Data   Recent Labs   Lab 04/27/19  0622 04/27/19  0600 04/26/19  0604 04/25/19  2253 04/25/19  1610  04/25/19  0515  04/24/19  0435  04/23/19  0510  04/22/19  1830  04/22/19  0615  04/21/19  1440   WBC 8.5  --   --   --   --   --  7.8  --   --   --  12.5*  --  19.8*  --  27.0*  --  13.7*   HGB  --  9.7* 10.2* 10.4*  --    < > 9.6*   < > 10.8*   < > 9.9*   < > 11.4*   < > 11.8*  --  14.8   MCV  --   --   --   --   --   --   --   --   --   --  94  --  93  --  93  --  94   PLT  --  199  --   --   --   --   --   --  266  --  172  --  207  --  271  --  283   INR  --   --   --   --   --   --   --   --   --   --   --   --   --   --   --   --  1.24*   NA  --  145* 145*  --   --   --  143  --  144   < > 144  --  143   < > 140   < > 138   POTASSIUM  --  3.5 3.6  --  3.6   <  > 3.1*  --  3.8   < > 3.5  --  4.1   < > 4.7   < > 4.3   CHLORIDE  --  112* 111*  --   --   --  110*  --  110*   < > 110*  --  109   < > 108   < > 103   CO2  --  27 27  --   --   --  26  --  23   < > 24  --  24   < > 24   < > 19*   BUN  --  10 12  --   --   --  16  --  25   < > 31*  --  31*   < > 30   < > 28   CR  --  0.91 0.93  --   --   --  1.07  --  1.35*   < > 1.80*  --  1.73*   < > 1.52*   < > 1.02   ANIONGAP  --  6 7  --   --   --  7  --  11   < > 10  --  10   < > 8   < > 16*   MAKENNA  --  7.4* 7.4*  --   --   --  7.4*  --  7.6*   < > 7.4*  --  7.8*   < > 8.1*   < > 9.6   GLC  --  78 91  --   --   --  148*  --  138*   < > 104*  --  113*   < > 120*   < > 236*   ALBUMIN  --   --   --   --   --   --   --   --  2.2*  --  2.1*  --   --   --  2.6*  --  3.5   PROTTOTAL  --   --   --   --   --   --   --   --  5.7*  --  5.2*  --   --   --  6.0*  --  8.0   BILITOTAL  --   --   --   --   --   --   --   --  0.5  --  0.5  --   --   --  0.6  --  0.6   ALKPHOS  --   --   --   --   --   --   --   --  63  --  57  --   --   --  68  --  109   ALT  --   --   --   --   --   --   --   --  10  --  10  --   --   --  15  --  20   AST  --   --   --   --   --   --   --   --  8  --  13  --   --   --  22  --  22   TROPI  --   --   --   --   --   --   --   --   --   --   --   --   --   --   --   --  <0.015    < > = values in this interval not displayed.

## 2019-04-27 NOTE — PROGRESS NOTES
DATE:  4/27/2019   TIME OF RECEIPT FROM LAB:  1102am  LAB TEST:  lactic  LAB VALUE:  2.3  RESULTS GIVEN WITH READ-BACK TO (PROVIDER): AUGUST Palomares  TIME LAB VALUE REPORTED TO PROVIDER:   1102am

## 2019-04-28 NOTE — PLAN OF CARE
Patient is alert et oriented x2, to self et environment. Patient is asleep for most of the day but will wake up with repeated motion. Patient abdomen is distended and rigid, BS hypoactive. Patient c/o pain to right side when touched. He had 2 loose stools today. Left sided weakness noted, he is unable to move his left arm or left leg, Edema noted to all extremities. Edema noted to abdomen et non pitting. Patient uses a cpap during the day for sleep. He had a temp of 100.7 this AM PRN APAP given @ 98.7, when rechecked. Will continue to monitor patient.

## 2019-04-28 NOTE — PROGRESS NOTES
Patient has been sleeping most of the morning and afternoon.  He did have another episode of paroxysmal atrial fibrillation that occurred on and off for less than an hour but none seen in the past hour.  CT scan of the abdomen performed and the previously seen colitis appears to be resolving but proximal to this area there is significant colonic distention without small bowel involvement of unclear etiology - the radiologist reports it may represent colonic ileus.  Case discussed with Dr. Walden, general surgeon on call for Dr. Carpenter, and he will review the images and evaluate the patient this afternoon.  Wife is present and updated on the current results and next steps in the plan.    Electronically Signed:  Elena Palomares MD

## 2019-04-28 NOTE — PROGRESS NOTES
Patient has been sleeping for most of the day. Tube placed to rectum, minimal bowel noted in tube. Patient abdomen remains distended with hypoactive BS. He c/o discomfort when the right side is touched. He remains on NPO with ice chips et sip of water with meds.

## 2019-04-28 NOTE — PROGRESS NOTES
I was asked to see Derek Aragon for follow-up of the CT by Dr Palomares, consult was done by Dr Carpenter  On 4/22/19    Hisotry taken from chart and wife who was at bedside. Patient will answer short easy questions but even then I had a hard time answering them     HPI:  This patient is a 78 year old  male with a significant past medical history of chronic kidney disease, coronary artery disease, diabetes, hypertension, morbid obesity and stroke who presents with the following condition requiring a hospital admission:     Presented to ED due to unresponsive during Easter lunch.  Per wife at bedside, pt been fine the up until then.  Pt to have elevated BS at home and was unresponsive.  Thus brought to hospital where he was noted to be hypotensive, pale, diaphoretic, with a right facial droop.  Patient worked up for acute CVA; which was negative.  Pt noted to have previous right sided CVA with residual left sided residual deficit.  Patient was resusciated in the ED with 6Ls of IVF, placement of central line, and abx.  Since, patient's BP is now normotensive.         I was asked to see the patient today due to increased abdominal distention and abnormal CT scan results.  Pt is alert to voice and was able to verbalized pain due the abdominal exam, but not much more than that.  Most history were given by his wife at bedside.  She stated pt was normal prior to lunch; he did complained of generalized abdominal pain prior to becoming unresponsive.  Never had C diff; hasn't been on abx the past few months.  No blood thinners at home.  No fevers, no chills at home.      ROS: unable to obtain due to patient not answering questions    Past Medical History:   Diagnosis Date     Anxiety state, unspecified     Mixed anxiety/depression     Diabetic eye exam (H) 10/10/12     Impotence of organic origin      Obesity, morbid, BMI 40.0-49.9 (H) 10/27/2015     Obesity, unspecified      Pure hypercholesterolemia      Sleep  disturbance, unspecified     Sleep apnea     Type II or unspecified type diabetes mellitus without mention of complication, not stated as uncontrolled      Unspecified cerebral artery occlusion with cerebral infarction      Unspecified essential hypertension        Past Surgical History:   Procedure Laterality Date     C OPEN RX FEMUR FX+INTRAMED EDE Left     2016     COLONOSCOPY  2012    Procedure: COLONOSCOPY;  colonoscopy;  Surgeon: Chris Crockett MD;  Location:  GI     ENT SURGERY      Parotid gland tumor removal     HC REMOVAL GALLBLADDER      Cholecystectomy       Social History     Socioeconomic History     Marital status:      Spouse name: Mattie     Number of children: 7     Years of education: Not on file     Highest education level: Not on file   Occupational History     Not on file   Social Needs     Financial resource strain: Not on file     Food insecurity:     Worry: Not on file     Inability: Not on file     Transportation needs:     Medical: Not on file     Non-medical: Not on file   Tobacco Use     Smoking status: Former Smoker     Packs/day: 1.00     Years: 20.00     Pack years: 20.00     Types: Cigarettes     Last attempt to quit: 1985     Years since quittin.3     Smokeless tobacco: Never Used   Substance and Sexual Activity     Alcohol use: No     Drug use: No     Sexual activity: Not Currently   Lifestyle     Physical activity:     Days per week: Not on file     Minutes per session: Not on file     Stress: Not on file   Relationships     Social connections:     Talks on phone: Not on file     Gets together: Not on file     Attends Protestant service: Not on file     Active member of club or organization: Not on file     Attends meetings of clubs or organizations: Not on file     Relationship status: Not on file     Intimate partner violence:     Fear of current or ex partner: Not on file     Emotionally abused: Not on file     Physically abused: Not on file      "Forced sexual activity: Not on file   Other Topics Concern     Parent/sibling w/ CABG, MI or angioplasty before 65F 55M? No   Social History Narrative     Not on file       No current outpatient medications on file.       Medications and history reviewed    Physical exam:  Vitals: /55 (BP Location: Right arm)   Pulse 91   Temp 98.8  F (37.1  C) (Axillary)   Resp 17   Ht 1.753 m (5' 9\")   Wt 137 kg (302 lb 0.5 oz)   SpO2 94%   BMI 44.60 kg/m    BMI= Body mass index is 44.6 kg/m .    Constitutional: obese, awake and no distress  Eyes: Pupils round and equal, no icterus   ENT: Mucous membranes moist  Respiratory:  Non-labored respiration on CPAP  Gastrointestinal: Abdomen largely distended, tender to palpation diffusely, no rebound, no involuntary guarding.  Psychiatric: mentation appears slowed with a blunted affect  Hematologic/Lymphatic/Immunologic: No bruising noted  Skin:  Erythema over sacrum  Rectal: Patient poorly cooperative with rectal exam, even though after discussion with wife he gave consent.  Increased sphincter tone.  Rectal tube placed minimal gas released and liquid stool noted in the tubing    Labs show:  Increased WBC to 11.1, The lacic acid is down to 1.4 and the pro calcitonin is also down    Imaging shows:  1. Moderate bilateral pleural effusions.  2. Diffuse colonic distention significantly increased since 4/22/2019.  Previously seen distal left colon bowel wall thickening consistent  with colitis is improved and nearly resolved with residual minimal  distal colon wall thickening.  3. No obstructing lesion or evidence for pneumatosis. This could  represent a diffuse colonic ileus.  4. Pancreatic tail is not optimally visualized, there is curvilinear  high attenuation and some lobulation and fullness in the region of the  superior pancreatic tail which is incompletely assessed on this exam.    Discussed the case with Dr Palomares, the wife and the nurse.     Assessment:   Infectious " colitis - due to the fact that it has improved with antibiotics, there is still the chance that this is     Plan: Discussed the risks benefits and alternatives with the wife. We discussed colonoscopy to completely r/o ischemic colitis, we discussed sigmoidoscopy to relieve the pressure. We also discussed rectal tube to help relive the pressure. We discussed at length the benefits of each along with the possible complications. Wife wishes to proceed with rectal tube with possible ridged verses flexible sigmoidoscopy if he doesn't improve and it still seems indicated at the time.     Valeriano Walden, DO

## 2019-04-28 NOTE — PROGRESS NOTES
[facility]    Medicine Progress Note - Hospitalist Service       Date of Admission:  4/21/2019  Assessment & Plan           Patient is a 78-year-old man admitted to the ICU on 4/21/19 with chronic medical problems admitted with acute life-threatening illness requiring pressor support, initially of unclear etiology however with further investigation septic shock due to acute ischemic bowel and colitis is suspected.  He improved with supportive cares including Zosyn and Vanco and was transitioned from the ICU on 4/25/19 with ongoing clinical improvement noted and patient was transitioned to PO Augmentin and Flagyl yesterday evening.  Patient did start having recurrent fevers up to 101.9 on 4/26/19 of unclear significance following being afebrile for over 48 hours but no other change in medical status has been noted and work up so far has not revealed any new infection with procalcitonin yesterday continuing to decrease with current regimen.  Patient did have another fever up to 101.4 last evening, again resolved with Tylenol and has been afebrile since.  He is now having increased abdominal distention compared to yesterday with diffuse abdominal pain (was localized in the LLQ yesterday).       Principal Problem:    Ischemic colitis (H)  Assessment: Present initially and characterized by abdominal pain, diarrhea, positive occult stool associated with initially grossly bloody (with subsequent maroon stools which are now resolving), elevated lactate, and  CT scan consistent with colitic, with clinical picture most consistent with ischemic colitis.  An acute thromboembolic event is possible however no occlusion or severe stenosis of the inferior mesenteric artery was noted on CT scan.  Infectious work up with enteric stool testing was negative.  Patient improved with bowel rest and antibiotics (Zosyn and Vancomycin) and has tolerated advancement of his diet to regular without difficulty and  was transitioned to PO Augmentin and Flagyl on 4/26/19.  Now with recurrent fever for the past 2 evenings with worsening abdominal distention and increased abdominal pain on palpation.  WBC has increased from 8.5 yesterday to 11.1 this morning, repeat procalcitonin is pending.  Plan:   Will perform portable abdomen x-ray for further evaluation of possible distention source with consideration of CT scan with contrast to follow if medically indicated.  Perform repeat lactic acid today (had been slightly elevated at 2.2 yesterday afternoon).  Continue Augmentin and Flagyl for ongoing antibiotic coverage of intra-abdominal infection suspicion and monitor for procalcitonin results which should return later today.  Will recheck BMP, WBC again tomorrow.  Continue to monitor patient closely.       Active Problems:    Septic shock (H)  Assessment: Resolving,  thought secondary to acute ischemic colitis as above.  Cultures have been negative, procalcitonin has continue to decrease, however complicated by recurrent fevers, worsening abdominal exam and return of elevated lactic acid       Plan:  Proceed with plan as above.        Fever    Assessment:  Present initially but had been resolved for over 48 hours with return the evening of 4/26/19 and recurrent again last evening at 101.4 of unclear etiology but concerning for worsening abdominal exam today compared to previous.  No increase in oxygen needs, UA normal when performed yesterday, blood cultures are negative x24 hours.       Plan:  Proceed with Augmentin and Flagyl but proceed with further evaluation of abdominal worsening as above and monitor closely       Peripheral vascular disease (H)  Assessment: Chronic  and stable; there has been concern for possible ischemic disease in the gut as well, however nothing was visualized on CT scan earlier this hospitalization  Plan:  Continue to hold aspirin for now because of active GI bleed       Type 2 diabetes mellitus with  complication, with long-term current use of insulin (H)  Assessment:  Patient was on an insulin drip initially, transitioned to subcutaneous insulin on 4/25/19 at lower than normal home regimen and currently on Lantus 15 units in the morning and 10 units in the evening with carb based coverage and sliding scale insulin as needed.  Blood sugars have been  in the past 24 hours  Plan:  Continue with Lantus insulin 15 units in the morning and 10 units at bedtime as well as mealtime Novolog 2 units per carb and ongoing NovoLog correction scale.  Continue to hold home metformin.         Diarrhea  Assessment:  Secondary to ischemic colitis and improving - only 2 loose stools overnight and they were brown in appearance   Plan: Treat ischemic colitis as above, no antidiarrheal medications        Metabolic acidosis, increased anion gap  Assessment:  present initially but resolved following bicarb containing IV fluids, thought secondary due to a combination of initial lactic acidosis and bicarb loss form diarrhea.  Fluids are now saline locked with ongoing resolution  Plan:   No further monitoring is needed       Acute kidney injury (H)  Assessment:   Secondary to septic shock, now resolving with creatinine back at baseline, continuing to be normal following discontinuation of IV fluids.   Plan:   Monitor for ongoing stability with PO intake alone       Hematochezia  Assessment:  Was initially grossly bloody, then black stool to dark maroon stool and now more brown normal appearance, consistent with hematochezia from ischemic colitis.  Hemoglobin has stabilized in the upper 9 to 10 range without signs of ongoing blood loss.    Plan:   Continue to monitor for ongoing resolution, will recheck hemoglobin tomorrow AM to ensure stability or sooner if concern for recurrent bleeding is found.       Paroxysmal atrial fibrillation (H) with aberrant conduction  Assessment:  Patient was noted to have frequent PVCs with short runs  of wide-complex tachycardia with irregular RR interval for about 7 hours on 4/24/19 with review of telemetry strips most consistent with paroxysmal atrial fibrillation and aberrant conduction.  Patient has no previous known history of atrial fibrillation and has not had a prolonged atrial fibrillation during this hospital stay. Patient has not had any recurrence of the atrial fibrillation since spontaneous resolution on the 24th.    Plan:   We will continue to monitor heart rate via telemetry.  Continue to attempt to keep potassium 4 or higher and magnesium 2 or higher.  Plan is to perform Zio patch shortly after discharge in order to further monitor patient's rhythm and evaluate for paroxysmal atrial fibrillation and need for possible long-term coagulation.  For now, given patient's recent GI bleeding, no anticoagulation is recommended        Hypomagnesemia  Assessment:   Discovered following patient's atrial fibrillation rhythm when this value was assessed.  Magnesium improving with supplementation  Plan: ongoing supplementation as needed       Anemia due to blood loss, acute - admit hgb 14.8  Assessment: Hemoglobin has stabilized in the upper 9 to 10 range secondary to GI bleed as above  Plan:   Continue to monitor for hemoglobin stability as above       MIO (obstructive sleep apnea)  Assessment: Chronic and stable on CPAP  Plan: continue home CPAP       Hemiparesis of left nondominant side as late effect of cerebral infarction (H)  Assessment: Chronic  Plan: No acute interventions       History of CVA (cerebrovascular accident) wit left hemiparesis  Assessment: Chronic  Plan:  Patient going to TCU at time of discharge for ongoing strengthening and hopeful return to previous baseline        Essential hypertension with goal blood pressure less than 140/90  Assessment:  Home blood pressure medications of lisinopril and hydrochlorothiazide and Lasix have been held secondary to septic shock.  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  Assessment: Chronic without overt seizure activity or apparent new focal neurologic deficits  Plan: No changes       Aphasia as late effect of stroke  Assessment: Chronic and stable  Plan: No changes       Wheelchair bound  Assessment: Chronic but likely will be even weaker than usual as he recovers from this severe acute illness  Plan: PT  has evaluated and patient will need TCU at time of discharge       Diet: Snacks/Supplements Adult: Boost Plus; Between Meals  Combination Diet Regular Diet Adult; 8780-0294 Calories: Moderate Consistent CHO (4-6 CHO units/meal)    DVT Prophylaxis: Pneumatic Compression Devices/Lovenox  Cast Catheter: not present  Code Status: DNR/DNI      Disposition Plan   Expected discharge: 2 - 4 days, recommended to transitional care unit once adequate pain management/ tolerating PO medications, antibiotic plan established and abdominal pain and fevers further evaluation and treated as appropriate.  Entered: Elena Palomares MD 04/28/2019, 7:15 AM       The patient's care was discussed with the Bedside Nurse, Patient and Patient's Family.    Elena Palomares MD  Hospitalist Service  [facility]    ______________________________________________________________________    Interval History   Patient has been eating and drinking  well, offers no concerns when asked however had recurrent fever again last evening up to 101.4 that resolved with tylenol and now has increasing abdominal distention and pain on palpation that was not there yesterday.  Other vital signs are stable.  Patient offers no other complaints.      Data reviewed today: I reviewed all medications, new labs and imaging results over the last 24 hours.    Physical Exam   Vital Signs: Temp: 99.1  F (37.3  C) Temp src: Axillary BP: 102/51 Pulse: 86 Heart Rate: 97 Resp: 18 SpO2: 92 % O2 Device: None (Room air)    Weight: 302 lbs .48 oz  Constitutional: awake, alert, cooperative, no apparent distress, and appears stated age  Respiratory: No increased work of breathing, decreased air exchange, clear to auscultation bilaterally, no crackles or wheezing  Cardiovascular: Normal apical impulse, regular rate and rhythm  GI: bowel sounds present but less active than yesterday, abdomen is obese but is more distended than yesterday with significant pain on palpation throughout the abdomen (compared to only pain in the LLQ yesterday on exam), no guarding or rebound tenderness noted  Musculoskeletal: ongoing left sided hemiparesis, ongoing 1+ pitting edema in bilateral lower legs  Neurologic: awake, alert, oriented to person and place and somewhat to time and situation but denies pain even as he is trying to remove this examiners hands from his abdomen secondary to the pain it is causing.      Data   Recent Labs   Lab 04/28/19  0500 04/27/19  1952 04/27/19  0622 04/27/19  0600 04/26/19  0604  04/25/19  0515  04/24/19  0435  04/23/19  0510  04/22/19  1830  04/22/19  0615  04/21/19  1440   WBC 11.1*  --  8.5  --   --   --  7.8  --   --   --  12.5*  --  19.8*  --  27.0*  --  13.7*   HGB 10.7* 11.0*  --  9.7* 10.2*   < > 9.6*   < > 10.8*   < > 9.9*   < > 11.4*   < > 11.8*  --  14.8   MCV  --   --   --   --   --   --   --   --   --   --  94  --  93  --  93  --  94   PLT  --   --   --  199  --   --    --   --  266  --  172  --  207  --  271  --  283   INR  --   --   --   --   --   --   --   --   --   --   --   --   --   --   --   --  1.24*   *  --   --  145* 145*  --  143  --  144   < > 144  --  143   < > 140   < > 138   POTASSIUM 3.7  --   --  3.5 3.6   < > 3.1*  --  3.8   < > 3.5  --  4.1   < > 4.7   < > 4.3   CHLORIDE 114*  --   --  112* 111*  --  110*  --  110*   < > 110*  --  109   < > 108   < > 103   CO2 26  --   --  27 27  --  26  --  23   < > 24  --  24   < > 24   < > 19*   BUN 12  --   --  10 12  --  16  --  25   < > 31*  --  31*   < > 30   < > 28   CR 0.98  --   --  0.91 0.93  --  1.07  --  1.35*   < > 1.80*  --  1.73*   < > 1.52*   < > 1.02   ANIONGAP 6  --   --  6 7  --  7  --  11   < > 10  --  10   < > 8   < > 16*   MAKENNA 7.6*  --   --  7.4* 7.4*  --  7.4*  --  7.6*   < > 7.4*  --  7.8*   < > 8.1*   < > 9.6   *  --   --  78 91  --  148*  --  138*   < > 104*  --  113*   < > 120*   < > 236*   ALBUMIN  --   --   --   --   --   --   --   --  2.2*  --  2.1*  --   --   --  2.6*  --  3.5   PROTTOTAL  --   --   --   --   --   --   --   --  5.7*  --  5.2*  --   --   --  6.0*  --  8.0   BILITOTAL  --   --   --   --   --   --   --   --  0.5  --  0.5  --   --   --  0.6  --  0.6   ALKPHOS  --   --   --   --   --   --   --   --  63  --  57  --   --   --  68  --  109   ALT  --   --   --   --   --   --   --   --  10  --  10  --   --   --  15  --  20   AST  --   --   --   --   --   --   --   --  8  --  13  --   --   --  22  --  22   TROPI  --   --   --   --   --   --   --   --   --   --   --   --   --   --   --   --  <0.015    < > = values in this interval not displayed.

## 2019-04-28 NOTE — PROGRESS NOTES
Pas completed.   Your information has been submitted on April 28th, 2019 at 10:56:43 AM CDT.   The confirmation number is [id number]      Discharge Planner   Discharge Plans in progress: Pt has been accepted and [facility], Pas # [phone], Transport W/C (handivan) VS stretcher ([facility]).   Barriers to discharge plan: Medically stable  Follow up plan: TCU       Entered by: Doreen Pina 04/28/2019 11:00 AM         Ludmila Pina BSN, RN, PHN  RN Care Coordinator  Care Transitions Department  [facility] [phone]  [facility] [phone]

## 2019-04-28 NOTE — PROGRESS NOTES
Patient's abdominal x-ray shows significant colonic gaseous distention of unclear etiology without small bowel involvement.  Patient is passing some flatus and is having loose stools ongoing.  Had another fever this morning of 100.7.  Discussed these findings with the patient's wife, Mattie, and will proceed with CT scan of the abdomen and pelvis with contrast to further evaluation possible etiology of fever and worsening colonic distention.  Will make patient NPO, start IV fluids for hydration and to help with renal flushing following contrast given patients recent history of LINDSAY, start every 4 hours blood sugar monitoring and sliding scale insulin, monitor closely for low blood sugars as patient did receive his morning dose of Lantus and add dextrose to IV fluids if needed.  Continue to monitor patient closely.    Electronically Signed:  Elena Palomares MD

## 2019-04-28 NOTE — PROGRESS NOTES
[id number]  VSS. A & O x 4. Afebrile throughout day. Did spike low temp this evening of 100.8. LS clear for most of shift. Occasional wheezing. +BS, +FL. Frequent loose stools. No blood noted in stool. Denies nausea. Incontinent of stool and urine. Brief on. Buttocks and scrotum red and painful. Scrotum edematous. Pt tearful with wiping. 0.2 mg Dilaudid given this morning. Oxycodone ordered after that and pt had 2 doses with good relief. Using lift to help turn. Turned and repositioned q 2 hrs.  Regular diet. Poor appetite. Pt refused supper, 1700 insulin not given.

## 2019-04-28 NOTE — PLAN OF CARE
Patient denies any pain through the night, but does react when cleaning after BM, denies any pain medication. Temperature was 101.4 at 2330 and received tylenol. Temp recheck was 100.4 and later on was 99.1. Patient had 2 large, loose, brown stools. Vital signs are otherwise stable. Will continue to monitor.

## 2019-04-29 PROBLEM — E87.6 HYPOKALEMIA: Status: ACTIVE | Noted: 2019-01-01

## 2019-04-29 NOTE — PLAN OF CARE
Discharge Planner PT   Patient plan for discharge: Home  Current status: Patient is a 78 year old male admitted wi septic shock. Patient has a previous medical history of HTN, restless leg syndrome, MIO with CPAP, DM type 2 with peripheral neuropathy, depression, obesity, CVA with left hemiparesis and aphasia, PVD, AFib, right femoral fracture in 2016.   Prior to admission patient living in a single family home with his wife and assistance of PCAs (unable to state duration and frequency of services). Patient wheelchair bound at baseline, assistance of 2 for ambulation, transferring, bathing with ted lift, power wheelchair, hospital bed, shower chair and grab bars. Patient unable to state the last time he attempted to stand.   Currently, patient unwilling to completely answer orientation questions. Patient with flat affect, eyes closed and lethargic. Patient answering questions with one word answer while rubbing left hand. Refused PROM of LUE. Demonstrated RLE 3/5 throughout and RUE 4-/5 throughout with poor grasp. LLE and LUE sensation intact, no active muscle activity throughout. Left hand postured in flexion contracture. Patient unable to complete RUE functional reach to attempt reaching. Patient agreeable to sitting up in chair for dinner, refuses at this time due to distance from dinner. Spoke with nursing, plan to ted lift to recliner to encourage antigravity strength and improved activity tolerance.  Barriers to return to prior living situation: Level of physical assistance for baseline mobility, medical status, impaired functional transfers  Recommendations for discharge: TCU, wheelchair transport with ted transfer into patient's wheelchair  Rationale for recommendations: Patient with low functional mobility at baseline. Given patient's report of baseline needs and current presentation patient would benefit from TCU placement to improve his ability to participate in his cares. Anticipate progression to  long term care placement due to co morbidities and functional capacity expectations.        Entered by: Rosa Sahu 04/29/2019 4:01 PM     Thank you for your referral.    Rosa Sahu, PT, DPT, ATC    [facility]    O: [phone]  E: [email or web address]

## 2019-04-29 NOTE — PROGRESS NOTES
04/29/19 1530   Quick Adds   Type of Visit Initial PT Evaluation       Present no   Living Environment   Lives With spouse   Living Arrangements house   Home Accessibility   (ramp to enter home. Stairs within the home -does not use)   Transportation Anticipated agency   Living Environment Comment Wife provides 24/7 assistance, PCA services a few days a week for set hours (patient unable to recall at this time). Patient living on ground level of home.    Self-Care   Usual Activity Tolerance fair   Current Activity Tolerance poor   Regular Exercise No   Equipment Currently Used at Home wheelchair, power;wheelchair, manual;transfer board;shower chair;commode;grab bar, toilet;grab bar, tub/shower;lift device;hospital bed   Functional Level Prior   Ambulation 3-->assistive equipment and person  (wheelchair bound- power chair)   Transferring 3-->assistive equipment and person  (ted lift with assist of 2)   Toileting 2-->assistive person  (assist of 2)   Bathing 3-->assistive equipment and person  (assist of 2 and shower chair, primary dependent)   Communication 0-->understands/communicates without difficulty   Swallowing 0-->swallows foods/liquids without difficulty   Cognition 0 - no cognition issues reported   Fall history within last six months no   Which of the above functional risks had a recent onset or change? transferring   Prior Functional Level Comment Patient wheelchair bound at baseline. Receives assistance for cares from his wife and PCA services.    General Information   Onset of Illness/Injury or Date of Surgery - Date 04/21/19   Referring Physician Dr. Byers   Patient/Family Goals Statement I plan on going home   Pertinent History of Current Problem (include personal factors and/or comorbidities that impact the POC) Patient is a 78 year old male admitted [facility] septic shock. Patient has a previous medical history of HTN, restless leg syndrome, MIO with CPAP, DM type 2 with  peripheral neuropathy, depression, obesity, CVA with left hemiparesis and aphasia, PVD, AFib, right femoral fracture in 2016.   Precautions/Limitations fall precautions   Weight-Bearing Status - RUE full weight-bearing   Weight-Bearing Status - RLE full weight-bearing   General Observations Patient lying in bed on room air, no obvious signs of distress. Short answers to questioning with eyes closed. Patient lethargic, howevera agreeable to PT evaluation.   General Info Comments PT orders: evaluate and treat weakness. Activity orders: up with assistance.    Cognitive Status Examination   Orientation person;place   Level of Consciousness lethargic/somnolent   Follows Commands and Answers Questions 75% of the time;able to follow single-step instructions   Personal Safety and Judgment intact   Memory impaired   Pain Assessment   Patient Currently in Pain No   Integumentary/Edema   Integumentary/Edema Comments Grade 2 pitting edema throughout all extremities.    Posture    Posture Comments unable to assess spine posture. LUE in flexion contracture at rest.   Range of Motion (ROM)   ROM Comment RUE PROM WNL, RLE limited flexion, abduction and IR. Refused PROM LUE and LLE.   Strength   Strength Comments RLE 3/5 throughout and RUE 4-/5 throughout with poor grasp. LLE and LUE sensation intact, no active muscle activity throughout.    Bed Mobility   Bed Mobility Comments Patient unable to complete without heavy assistance, unable to reach RUE across body to initate rolling   Transfer Skills   Transfer Comments Refused at this time, agreeable to ted transfer into chair.    Gait   Gait Comments unable   Balance   Balance Comments unable   Sensory Examination   Sensory Perception Comments Reports intact sensation throughout all extremities   Muscle Tone   Muscle Tone Comments Increased flexion tone LUE, flaccid LLE   General Therapy Interventions   Planned Therapy Interventions bed mobility training;ROM;strengthening;transfer  "training   Clinical Impression   Criteria for Skilled Therapeutic Intervention yes, treatment indicated   PT Diagnosis muscle weakness   Influenced by the following impairments acute medical status and chronic medical condition   Functional limitations due to impairments increased physical assistance for baseline transfers   Clinical Presentation Evolving/Changing   Clinical Presentation Rationale Patient with flat affect and intermittently ignoring PT. Anticipate progress towards more stable cognition with medical management which will allow for greater participation in PT.    Clinical Decision Making (Complexity) Moderate complexity   Therapy Frequency` 5 times/week   Predicted Duration of Therapy Intervention (days/wks) 2-3 days   Anticipated Equipment Needs at Discharge   (defer to TCU)   Anticipated Discharge Disposition Transitional Care Facility   Risk & Benefits of therapy have been explained Yes   Patient, Family & other staff in agreement with plan of care Yes   Clinical Impression Comments Patient with low functional mobility at baseline. Given patient's report of baseline needs and current presentation patient would benefit from TCU placement to improve his ability to participate in his cares. Anticipate progression to long term care placement due to co morbidities and functional capacity expectations.    [facility] [employer] \"6 Clicks\"   2016, Trustees of [facility], under license to [employer].  All rights reserved.   6 Clicks Short Forms Basic Mobility Inpatient Short Form   [facility] [employer]  \"6 Clicks\" V.2 Basic Mobility Inpatient Short Form   1. Turning from your back to your side while in a flat bed without using bedrails? 1 - Total   2. Moving from lying on your back to sitting on the side of a flat bed without using bedrails? 1 - Total   3. Moving to and from a bed to a chair (including a wheelchair)? 1 - Total   4. Standing up from a chair using your arms (e.g., " wheelchair, or bedside chair)? 1 - Total   5. To walk in hospital room? 1 - Total   6. Climbing 3-5 steps with a railing? 1 - Total   Basic Mobility Raw Score (Score out of 24.Lower scores equate to lower levels of function) 6   Total Evaluation Time   Total Evaluation Time (Minutes) 20     Thank you for your referral.    Rosa Sahu, PT, DPT, ATC    [facility]    O: [phone]  E: [email or web address]

## 2019-04-29 NOTE — PROGRESS NOTES
"GENERAL SURGERY PROGRESS NOTE    Name:  Derek Aragon Date/Time of Admission: 2019  2:28 PM   CSN: [id number]  Attending Provider: Celso Dewitt MD   Room/Bed:  [place] [address]-01 : 1940 78 year old       2019   HD #9 AMS, septic shock, ischemic colitis    SUBJECTIVE:     Patient seen and examined. Pt no longer in ICU; no longer in pressors.  No pain.  abd distention; NPO.  Alert; answering questions yes or no.      Current Meds:  Scheduled Meds:    amoxicillin-clavulanate  1 tablet Oral Q12H ALYSSA     heparin lock flush  5-10 mL Intracatheter Q24H     insulin aspart  1-6 Units Subcutaneous Q4H     insulin glargine  10 Units Subcutaneous At Bedtime     insulin glargine  15 Units Subcutaneous QAM AC     pantoprazole (PROTONIX) IV  40 mg Intravenous Daily with breakfast     sodium chloride (PF)  10 mL Intravenous Q8H     Continuous Infusions:    dextrose 5% and 0.9% NaCl with potassium chloride 20 mEq 100 mL/hr at 19 1056     - MEDICATION INSTRUCTIONS -       PRN Meds:.acetaminophen, glucose **OR** dextrose **OR** glucagon, heparin lock flush, HYDROmorphone, lidocaine 4%, lidocaine (buffered or not buffered), magnesium sulfate, - MEDICATION INSTRUCTIONS -, naloxone, ondansetron **OR** ondansetron, oxyCODONE **OR** oxyCODONE, potassium chloride, potassium chloride with lidocaine, potassium chloride, potassium chloride, potassium chloride, prochlorperazine **OR** prochlorperazine **OR** prochlorperazine, sodium chloride (PF)     OBJECTIVE:     Vital Signs:  /54 (BP Location: Right arm)   Pulse 91   Temp 96  F (35.6  C) (Oral)   Resp 20   Ht 1.753 m (5' 9\")   Wt 137 kg (302 lb 0.5 oz)   SpO2 93%   BMI 44.60 kg/m       I/O:    I/O last 3 completed shifts:  In: 2337 [P.O.:480; I.V.:1857]  Out: 440 [Emesis/NG output:40; Stool:400]   I/O this shift:  In: 220 [I.V.:220]  Out: -       PHYSICAL EXAMINATION   General  Alert but confused.     Lungs normal chest wall excursion   Heart S1, S2 "   Abdominal Soft; mildly distended; morbidly obese; generalized pain on deep palpation   Extremities No gross deformities noted appreciated    Incisions n/a     Labs:   Last CBC w diff Results:  Recent Labs   Lab 04/29/19  0530 04/28/19  0500 04/27/19  0622 04/27/19  0600  04/24/19  0435 04/23/19  0510 04/22/19  1830   WBC 9.0 11.1* 8.5  --    < >  --  12.5* 19.8*   HCT  --   --   --   --   --   --  31.3* 35.8*   PLT  --   --   --  199  --  266 172 207   RBC  --   --   --   --   --   --  3.32* 3.84*   MCV  --   --   --   --   --   --  94 93   MCHC  --   --   --   --   --   --  31.6 31.8   MCH  --   --   --   --   --   --  29.8 29.7   RDW  --   --   --   --   --   --  14.4 14.4    < > = values in this interval not displayed.    Last Renal Function Results:  Recent Labs   Lab 04/29/19  0530 04/28/19  0500 04/27/19  0600  04/24/19  0435  04/23/19  0510    146* 145*   < > 144   < > 144   CO2 26 26 27   < > 23   < > 24   BUN 10 12 10   < > 25   < > 31*   ALBUMIN  --   --   --   --  2.2*  --  2.1*    < > = values in this interval not displayed.                Last Liver Function/Coags/Cardiac Results:  Recent Labs   Lab 04/24/19 0435 04/23/19  0510   ALT 10 10   AST 8 13   BILITOTAL 0.5 0.5   ALKPHOS 63 57    Microbiology:  [unfilled]   [unfilled]      Imaging Studies: [unfilled]    ASSESSMENT:     Principal Problem:    Ischemic colitis (H)  Active Problems:    Peripheral vascular disease (H)    History of CVA (cerebrovascular accident) wit left hemiparesis    Essential hypertension with goal blood pressure less than 140/90    Obesity, morbid, BMI 40.0-49.9 (H)    Type 2 diabetes mellitus with complication, with long-term current use of insulin (H)    Diarrhea    Metabolic acidosis, increased anion gap    Septic shock (H)    Encephalomalacia with old cerebral infarction (H) right MCA territory    Aphasia as late effect of stroke    Acute kidney injury (H)    Leukocytosis    Hematochezia    Paroxysmal atrial  fibrillation (H) with aberrant conduction    Hypomagnesemia    Anemia due to blood loss, acute - admit hgb 14.8    Hypokalemia    MIO (obstructive sleep apnea)    78 y.o M HD #9 for AMS, septic shock, ischemic colitis  -septic shock resolved  -Has colonic ileus  -ischemic colitis resolving  -C diff negative  -no signs of peritonitis this AM    PLAN:     Remove rectal tube  Start clears  Check phos  Replace K+ - currently at 3.1  PT/OT and get out of bed.    No indication for surgery at this point.     Time talking to patient including looking up records and talking to other physicians and nurses about patient care is 30 mins .    Electronically signed by:  Homero Carpenter MD  4/23/2019

## 2019-04-29 NOTE — PROGRESS NOTES
Updated Marilynn, at [facility], that patient is not medically ready for discharge today.    RAYSA Jean-Baptiste  [facility] [phone]/ [facility] [phone]

## 2019-04-29 NOTE — PLAN OF CARE
Pt alert/cooperative, bed rest, pt has had multiple loose large watery stools, rectal cath removed per Dr. Carpenter request, pt's wife Mattie here today and talked with Dr. Carpenter, plan to start clear diet, up as tolerated, PT/OT ordered, VSS, b/p's have been soft, 100's/50's, potassium replaced today, result came back 3.5, high potassium protocol dc'd, phosphate replaced, has continuous fluids D5/0.9%/20mEq/ 100/hr, pt had low bs this am 61,85,96, poor intake, had bites of jello and sips of apple juice, BS active, passing gas, incontinent of urine/stool, gave prn tylenol afternoon for 100.8 temp, no nausea/vomiting, telemetry NSR, BS QID.

## 2019-04-29 NOTE — PLAN OF CARE
Patient reported pain in groin and buttock and stated oxycodone is effective pain control. When assessing rectal tube it was found that the hub of the tubing was pressing into the scrotum and caused a small abrasion that was draining a scant amount of blood. Wound was cleansed with soap and water and hub was padded to prevent any further injury. Temperature at 0000 was 100.6 and treated with tylenol, recheck was 97.5.Vital signs have been otherwise stable. Will continue to monitor.

## 2019-04-29 NOTE — PROGRESS NOTES
[facility]    Hospitalist Progress Note      Assessment & Plan   Patient is a 78-year-old man admitted to the ICU on 4/21/19 with chronic medical problems admitted with acute life-threatening illness requiring pressor support, initially of unclear etiology however with further investigation septic shock due to acute ischemic bowel and colitis is suspected.  He improved with supportive cares including Zosyn and Vanco and was transitioned from the ICU on 4/25/19 with ongoing clinical improvement noted and patient was transitioned to PO Augmentin and Flagyl on 4/27/19  Patient did have recurrent fevers up to 101.9 on 4/26/19 of unclear significance following being afebrile for over 48 hours but no other change in medical status has been noted and work up so far has not revealed any new infection with procalcitonin  continuing to decrease with current regimen.  pt has fever up to 100.7 again resolved with Tylenol and has been afebrile since.  abd distention seem to worsening on 4/28/19, surgery was reconsulted, as per surgery rectal tube placed, abd distention persist. It was noted pt lytes show K of 3.1. All other lytes normal. Surgery plan No intervention at this time      Principal Problem:    Ischemic colitis (H)    Assessment: on augmentin and flagyl. Seem to be doing well. procalcitonin is normal. Discuss with surgery seem to be ileus, surgery recommend stop flagyl and continue with augmentin    Plan: will stop flagyl. Start clear liquid diet. Remove rectal tube, PT and OT started. Advice patient to  Start ambulate to help ileus    Active Problems:   Septic shock (H)    Assessment: Resolving,      Plan:  Proceed with plan as above.       Peripheral vascular disease (H)    Assessment: stable    Plan: monitor only      History of CVA (cerebrovascular accident) wit left hemiparesis    Assessment: stable     Plan: monitor only      Essential hypertension with goal blood pressure less  than 140/90    Assessment: bp is rather low but normal    Plan: currently home regiment is on hold. 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 Currently on augmentin and flagyl    Plan: as above stop flagyl      Paroxysmal atrial fibrillation (H) with aberrant conduction    Assessment: currently on sinus rhythm,     Plan: monitor only      Hypomagnesemia    Assessment: mg is 2.0    Plan: monitor only for now, replace prn      Anemia due to blood loss, acute - admit hgb 14.8    Assessment: hb is 10.8    Plan: monitor for now, stable      Hypokalemia    Assessment: k is 3.1    Plan: replace      MIO (obstructive sleep apnea)    Assessment: on cpap    Plan: continue cpap       # Pain Assessment:  Current Pain Score 4/29/2019   Patient currently in pain? denies   Pain score (0-10) -   Pain location -   Pain descriptors -   Derek s pain level was assessed and he currently denies pain.      DVT Prophylaxis: Pneumatic Compression Devices  Code Status: DNR/DNI    Disposition: Expected discharge in 1-2  days once abd distention improved.    Evelyn Byers    Interval History   abd distention persis, rectal tube show no significant gas, surgery evaluate and think it is about the same or improved from previous condition. Surgery recommend  start diet and start ambulation. Also remove rectal tube    -Data reviewed today: I reviewed all new labs and imaging results over the last 24 hours. I personally reviewed no images or EKG's today.  Recent Results (from the past 168 hour(s))   Glucose by meter    Collection Time: 04/22/19 11:33 AM   Result Value Ref Range    Glucose 115 (H) 70 - 99 mg/dL   Basic metabolic panel    Collection Time: 04/22/19 12:00 PM   Result Value Ref Range    Sodium 142 133 - 144 mmol/L    Potassium 4.4 3.4 - 5.3 mmol/L    Chloride 109 94 - 109 mmol/L    Carbon Dioxide 25 20 - 32 mmol/L    Anion Gap 8 3 - 14 mmol/L    Glucose 120 (H) 70 - 99 mg/dL    Urea Nitrogen 32 (H) 7 - 30 mg/dL    Creatinine 1.65 (H) 0.66 - 1.25 mg/dL    GFR Estimate 39 (L) >60 mL/min/[1.73_m2]    GFR Estimate If Black 45 (L) >60 mL/min/[1.73_m2]    Calcium 8.0 (L) 8.5 - 10.1 mg/dL   Hemoglobin    Collection Time: 04/22/19 12:00 PM   Result Value Ref Range    Hemoglobin 12.2 (L) 13.3 - 17.7 g/dL   Lactic acid whole blood    Collection Time: 04/22/19 12:00 PM   Result Value Ref Range    Lactic Acid 2.2 (H) 0.7 - 2.0 mmol/L   Glucose by meter    Collection Time: 04/22/19 12:34 PM   Result Value Ref Range    Glucose 143 (H) 70 - 99 mg/dL   Glucose by meter    Collection Time: 04/22/19  2:01 PM   Result Value Ref Range    Glucose 102 (H) 70 - 99 mg/dL   Glucose by meter    Collection Time: 04/22/19  3:33 PM   Result Value Ref Range    Glucose 111 (H) 70 - 99 mg/dL   Glucose by meter    Collection Time: 04/22/19  4:40 PM   Result Value Ref Range    Glucose 100 (H) 70 - 99 mg/dL   Glucose by meter    Collection Time: 04/22/19  5:35 PM   Result Value Ref Range    Glucose 100 (H) 70 - 99 mg/dL   Basic metabolic panel    Collection Time: 04/22/19  6:30 PM   Result Value Ref Range    Sodium 143 133 - 144 mmol/L    Potassium 4.1 3.4 - 5.3 mmol/L    Chloride 109 94 - 109 mmol/L    Carbon Dioxide 24 20 - 32 mmol/L    Anion Gap 10 3 - 14 mmol/L    Glucose 113 (H) 70 - 99  mg/dL    Urea Nitrogen 31 (H) 7 - 30 mg/dL    Creatinine 1.73 (H) 0.66 - 1.25 mg/dL    GFR Estimate 37 (L) >60 mL/min/[1.73_m2]    GFR Estimate If Black 43 (L) >60 mL/min/[1.73_m2]    Calcium 7.8 (L) 8.5 - 10.1 mg/dL   CBC with platelets    Collection Time: 04/22/19  6:30 PM   Result Value Ref Range    WBC 19.8 (H) 4.0 - 11.0 10e9/L    RBC Count 3.84 (L) 4.4 - 5.9 10e12/L    Hemoglobin 11.4 (L) 13.3 - 17.7 g/dL    Hematocrit 35.8 (L) 40.0 - 53.0 %    MCV 93 78 - 100 fl    MCH 29.7 26.5 - 33.0 pg    MCHC 31.8 31.5 - 36.5 g/dL    RDW 14.4 10.0 - 15.0 %    Platelet Count 207 150 - 450 10e9/L   Lactic acid whole blood    Collection Time: 04/22/19  6:30 PM   Result Value Ref Range    Lactic Acid 0.9 0.7 - 2.0 mmol/L   Glucose by meter    Collection Time: 04/22/19  6:40 PM   Result Value Ref Range    Glucose 109 (H) 70 - 99 mg/dL   Glucose by meter    Collection Time: 04/22/19  7:39 PM   Result Value Ref Range    Glucose 97 70 - 99 mg/dL   Glucose by meter    Collection Time: 04/22/19  8:30 PM   Result Value Ref Range    Glucose 100 (H) 70 - 99 mg/dL   Glucose by meter    Collection Time: 04/22/19  9:39 PM   Result Value Ref Range    Glucose 108 (H) 70 - 99 mg/dL   Glucose by meter    Collection Time: 04/22/19 10:32 PM   Result Value Ref Range    Glucose 123 (H) 70 - 99 mg/dL   Glucose by meter    Collection Time: 04/22/19 11:53 PM   Result Value Ref Range    Glucose 115 (H) 70 - 99 mg/dL   Vancomycin level    Collection Time: 04/23/19  1:10 AM   Result Value Ref Range    Vancomycin Level 40.1 (HH) mg/L   Hemoglobin    Collection Time: 04/23/19  1:10 AM   Result Value Ref Range    Hemoglobin 10.6 (L) 13.3 - 17.7 g/dL   Glucose by meter    Collection Time: 04/23/19  2:00 AM   Result Value Ref Range    Glucose 107 (H) 70 - 99 mg/dL   Glucose by meter    Collection Time: 04/23/19  4:00 AM   Result Value Ref Range    Glucose 99 70 - 99 mg/dL   CBC with platelets    Collection Time: 04/23/19  5:10 AM   Result Value Ref Range     WBC 12.5 (H) 4.0 - 11.0 10e9/L    RBC Count 3.32 (L) 4.4 - 5.9 10e12/L    Hemoglobin 9.9 (L) 13.3 - 17.7 g/dL    Hematocrit 31.3 (L) 40.0 - 53.0 %    MCV 94 78 - 100 fl    MCH 29.8 26.5 - 33.0 pg    MCHC 31.6 31.5 - 36.5 g/dL    RDW 14.4 10.0 - 15.0 %    Platelet Count 172 150 - 450 10e9/L   Lactic acid whole blood    Collection Time: 04/23/19  5:10 AM   Result Value Ref Range    Lactic Acid 0.7 0.7 - 2.0 mmol/L   Procalcitonin    Collection Time: 04/23/19  5:10 AM   Result Value Ref Range    Procalcitonin 17.49 (HH) ng/ml   Comprehensive metabolic panel    Collection Time: 04/23/19  5:10 AM   Result Value Ref Range    Sodium 144 133 - 144 mmol/L    Potassium 3.5 3.4 - 5.3 mmol/L    Chloride 110 (H) 94 - 109 mmol/L    Carbon Dioxide 24 20 - 32 mmol/L    Anion Gap 10 3 - 14 mmol/L    Glucose 104 (H) 70 - 99 mg/dL    Urea Nitrogen 31 (H) 7 - 30 mg/dL    Creatinine 1.80 (H) 0.66 - 1.25 mg/dL    GFR Estimate 35 (L) >60 mL/min/[1.73_m2]    GFR Estimate If Black 41 (L) >60 mL/min/[1.73_m2]    Calcium 7.4 (L) 8.5 - 10.1 mg/dL    Bilirubin Total 0.5 0.2 - 1.3 mg/dL    Albumin 2.1 (L) 3.4 - 5.0 g/dL    Protein Total 5.2 (L) 6.8 - 8.8 g/dL    Alkaline Phosphatase 57 40 - 150 U/L    ALT 10 0 - 70 U/L    AST 13 0 - 45 U/L   Blood gas venous    Collection Time: 04/23/19  5:10 AM   Result Value Ref Range    Ph Venous 7.34 7.32 - 7.43 pH    PCO2 Venous 49 40 - 50 mm Hg    PO2 Venous 35 25 - 47 mm Hg    Bicarbonate Venous 27 21 - 28 mmol/L    Base Excess Venous 0.3 mmol/L    FIO2 21    Glucose by meter    Collection Time: 04/23/19  5:52 AM   Result Value Ref Range    Glucose 103 (H) 70 - 99 mg/dL   Vancomycin level    Collection Time: 04/23/19  6:36 AM   Result Value Ref Range    Vancomycin Level 33.0 (HH) mg/L   Glucose by meter    Collection Time: 04/23/19  8:07 AM   Result Value Ref Range    Glucose 108 (H) 70 - 99 mg/dL   Glucose by meter    Collection Time: 04/23/19 10:03 AM   Result Value Ref Range    Glucose 118 (H) 70 -  99 mg/dL   Glucose by meter    Collection Time: 04/23/19 12:37 PM   Result Value Ref Range    Glucose 136 (H) 70 - 99 mg/dL   Basic metabolic panel    Collection Time: 04/23/19  1:00 PM   Result Value Ref Range    Sodium 143 133 - 144 mmol/L    Potassium 3.5 3.4 - 5.3 mmol/L    Chloride 109 94 - 109 mmol/L    Carbon Dioxide 26 20 - 32 mmol/L    Anion Gap 8 3 - 14 mmol/L    Glucose 124 (H) 70 - 99 mg/dL    Urea Nitrogen 30 7 - 30 mg/dL    Creatinine 1.65 (H) 0.66 - 1.25 mg/dL    GFR Estimate 39 (L) >60 mL/min/[1.73_m2]    GFR Estimate If Black 45 (L) >60 mL/min/[1.73_m2]    Calcium 7.5 (L) 8.5 - 10.1 mg/dL   Hemoglobin    Collection Time: 04/23/19  1:00 PM   Result Value Ref Range    Hemoglobin 10.3 (L) 13.3 - 17.7 g/dL   ABO/Rh type and screen    Collection Time: 04/23/19  1:00 PM   Result Value Ref Range    ABO A     RH(D) Pos     Antibody Screen Neg     Test Valid Only At [facility]        Specimen Expires 04/26/2019    Glucose by meter    Collection Time: 04/23/19  2:20 PM   Result Value Ref Range    Glucose 106 (H) 70 - 99 mg/dL   Glucose by meter    Collection Time: 04/23/19  4:34 PM   Result Value Ref Range    Glucose 113 (H) 70 - 99 mg/dL   Glucose by meter    Collection Time: 04/23/19  6:03 PM   Result Value Ref Range    Glucose 112 (H) 70 - 99 mg/dL   Vancomycin level    Collection Time: 04/23/19  7:03 PM   Result Value Ref Range    Vancomycin Level 27.3 (HH) mg/L   Basic metabolic panel    Collection Time: 04/23/19  7:03 PM   Result Value Ref Range    Sodium 143 133 - 144 mmol/L    Potassium 3.4 3.4 - 5.3 mmol/L    Chloride 109 94 - 109 mmol/L    Carbon Dioxide 25 20 - 32 mmol/L    Anion Gap 9 3 - 14 mmol/L    Glucose 117 (H) 70 - 99 mg/dL    Urea Nitrogen 28 7 - 30 mg/dL    Creatinine 1.45 (H) 0.66 - 1.25 mg/dL    GFR Estimate 46 (L) >60 mL/min/[1.73_m2]    GFR Estimate If Black 53 (L) >60 mL/min/[1.73_m2]    Calcium 7.5 (L) 8.5 - 10.1 mg/dL   Hemoglobin    Collection Time: 04/23/19  7:03  PM   Result Value Ref Range    Hemoglobin 10.3 (L) 13.3 - 17.7 g/dL   Glucose by meter    Collection Time: 04/23/19  7:46 PM   Result Value Ref Range    Glucose 117 (H) 70 - 99 mg/dL   Glucose by meter    Collection Time: 04/23/19  9:53 PM   Result Value Ref Range    Glucose 118 (H) 70 - 99 mg/dL   Basic metabolic panel    Collection Time: 04/23/19 11:50 PM   Result Value Ref Range    Sodium 143 133 - 144 mmol/L    Potassium 3.3 (L) 3.4 - 5.3 mmol/L    Chloride 109 94 - 109 mmol/L    Carbon Dioxide 23 20 - 32 mmol/L    Anion Gap 11 3 - 14 mmol/L    Glucose 119 (H) 70 - 99 mg/dL    Urea Nitrogen 26 7 - 30 mg/dL    Creatinine 1.38 (H) 0.66 - 1.25 mg/dL    GFR Estimate 49 (L) >60 mL/min/[1.73_m2]    GFR Estimate If Black 56 (L) >60 mL/min/[1.73_m2]    Calcium 7.3 (L) 8.5 - 10.1 mg/dL   Hemoglobin    Collection Time: 04/23/19 11:50 PM   Result Value Ref Range    Hemoglobin 10.3 (L) 13.3 - 17.7 g/dL   Lactic acid level STAT for sepsis protocol    Collection Time: 04/24/19  1:10 AM   Result Value Ref Range    Lactate for Sepsis Protocol <0.4 (L) 0.7 - 2.0 mmol/L   Glucose by meter    Collection Time: 04/24/19  2:05 AM   Result Value Ref Range    Glucose 126 (H) 70 - 99 mg/dL   Glucose by meter    Collection Time: 04/24/19  4:16 AM   Result Value Ref Range    Glucose 139 (H) 70 - 99 mg/dL   Platelet count    Collection Time: 04/24/19  4:35 AM   Result Value Ref Range    Platelet Count 266 150 - 450 10e9/L   Hemoglobin    Collection Time: 04/24/19  4:35 AM   Result Value Ref Range    Hemoglobin 10.8 (L) 13.3 - 17.7 g/dL   Comprehensive metabolic panel    Collection Time: 04/24/19  4:35 AM   Result Value Ref Range    Sodium 144 133 - 144 mmol/L    Potassium 3.8 3.4 - 5.3 mmol/L    Chloride 110 (H) 94 - 109 mmol/L    Carbon Dioxide 23 20 - 32 mmol/L    Anion Gap 11 3 - 14 mmol/L    Glucose 138 (H) 70 - 99 mg/dL    Urea Nitrogen 25 7 - 30 mg/dL    Creatinine 1.35 (H) 0.66 - 1.25 mg/dL    GFR Estimate 50 (L) >60  mL/min/[1.73_m2]    GFR Estimate If Black 58 (L) >60 mL/min/[1.73_m2]    Calcium 7.6 (L) 8.5 - 10.1 mg/dL    Bilirubin Total 0.5 0.2 - 1.3 mg/dL    Albumin 2.2 (L) 3.4 - 5.0 g/dL    Protein Total 5.7 (L) 6.8 - 8.8 g/dL    Alkaline Phosphatase 63 40 - 150 U/L    ALT 10 0 - 70 U/L    AST 8 0 - 45 U/L   Procalcitonin    Collection Time: 04/24/19  4:35 AM   Result Value Ref Range    Procalcitonin 5.23 ng/ml   Magnesium    Collection Time: 04/24/19  4:35 AM   Result Value Ref Range    Magnesium 2.1 1.6 - 2.3 mg/dL   Blood gas venous    Collection Time: 04/24/19  4:36 AM   Result Value Ref Range    Ph Venous 7.33 7.32 - 7.43 pH    PCO2 Venous 46 40 - 50 mm Hg    PO2 Venous 37 25 - 47 mm Hg    Bicarbonate Venous 24 21 - 28 mmol/L    Base Deficit Venous 1.7 mmol/L    FIO2 24    Glucose by meter    Collection Time: 04/24/19  6:02 AM   Result Value Ref Range    Glucose 142 (H) 70 - 99 mg/dL   Glucose by meter    Collection Time: 04/24/19  7:46 AM   Result Value Ref Range    Glucose 149 (H) 70 - 99 mg/dL   Glucose by meter    Collection Time: 04/24/19 10:18 AM   Result Value Ref Range    Glucose 138 (H) 70 - 99 mg/dL   Glucose by meter    Collection Time: 04/24/19 12:13 PM   Result Value Ref Range    Glucose 128 (H) 70 - 99 mg/dL   Glucose by meter    Collection Time: 04/24/19  2:04 PM   Result Value Ref Range    Glucose 180 (H) 70 - 99 mg/dL   Glucose by meter    Collection Time: 04/24/19  4:33 PM   Result Value Ref Range    Glucose 144 (H) 70 - 99 mg/dL   Glucose by meter    Collection Time: 04/24/19  6:08 PM   Result Value Ref Range    Glucose 141 (H) 70 - 99 mg/dL   Hemoglobin    Collection Time: 04/24/19  6:15 PM   Result Value Ref Range    Hemoglobin 10.0 (L) 13.3 - 17.7 g/dL   Glucose by meter    Collection Time: 04/24/19  7:59 PM   Result Value Ref Range    Glucose 130 (H) 70 - 99 mg/dL   Glucose by meter    Collection Time: 04/24/19  9:45 PM   Result Value Ref Range    Glucose 171 (H) 70 - 99 mg/dL   Glucose by meter     Collection Time: 04/24/19 11:41 PM   Result Value Ref Range    Glucose 120 (H) 70 - 99 mg/dL   Glucose by meter    Collection Time: 04/25/19 12:58 AM   Result Value Ref Range    Glucose 136 (H) 70 - 99 mg/dL   Glucose by meter    Collection Time: 04/25/19  1:58 AM   Result Value Ref Range    Glucose 131 (H) 70 - 99 mg/dL   Lactic acid level STAT for sepsis protocol    Collection Time: 04/25/19  2:45 AM   Result Value Ref Range    Lactate for Sepsis Protocol 0.9 0.7 - 2.0 mmol/L   Glucose by meter    Collection Time: 04/25/19  3:04 AM   Result Value Ref Range    Glucose 124 (H) 70 - 99 mg/dL   Hemoglobin    Collection Time: 04/25/19  5:15 AM   Result Value Ref Range    Hemoglobin 9.6 (L) 13.3 - 17.7 g/dL   Basic metabolic panel    Collection Time: 04/25/19  5:15 AM   Result Value Ref Range    Sodium 143 133 - 144 mmol/L    Potassium 3.1 (L) 3.4 - 5.3 mmol/L    Chloride 110 (H) 94 - 109 mmol/L    Carbon Dioxide 26 20 - 32 mmol/L    Anion Gap 7 3 - 14 mmol/L    Glucose 148 (H) 70 - 99 mg/dL    Urea Nitrogen 16 7 - 30 mg/dL    Creatinine 1.07 0.66 - 1.25 mg/dL    GFR Estimate 66 >60 mL/min/[1.73_m2]    GFR Estimate If Black 77 >60 mL/min/[1.73_m2]    Calcium 7.4 (L) 8.5 - 10.1 mg/dL   Magnesium    Collection Time: 04/25/19  5:15 AM   Result Value Ref Range    Magnesium 1.5 (L) 1.6 - 2.3 mg/dL   Procalcitonin    Collection Time: 04/25/19  5:15 AM   Result Value Ref Range    Procalcitonin 2.30 ng/ml   Blood gas venous    Collection Time: 04/25/19  5:15 AM   Result Value Ref Range    Ph Venous 7.40 7.32 - 7.43 pH    PCO2 Venous 46 40 - 50 mm Hg    PO2 Venous 34 25 - 47 mm Hg    Bicarbonate Venous 28 21 - 28 mmol/L    Base Excess Venous 2.6 mmol/L    FIO2 24    WBC count    Collection Time: 04/25/19  5:15 AM   Result Value Ref Range    WBC 7.8 4.0 - 11.0 10e9/L   Glucose by meter    Collection Time: 04/25/19  6:55 AM   Result Value Ref Range    Glucose 158 (H) 70 - 99 mg/dL   Glucose by meter    Collection Time: 04/25/19   8:46 AM   Result Value Ref Range    Glucose 125 (H) 70 - 99 mg/dL   Magnesium    Collection Time: 04/25/19  9:45 AM   Result Value Ref Range    Magnesium 2.6 (H) 1.6 - 2.3 mg/dL   Potassium    Collection Time: 04/25/19  9:45 AM   Result Value Ref Range    Potassium 3.3 (L) 3.4 - 5.3 mmol/L   Glucose by meter    Collection Time: 04/25/19 10:01 AM   Result Value Ref Range    Glucose 114 (H) 70 - 99 mg/dL   Glucose by meter    Collection Time: 04/25/19 11:07 AM   Result Value Ref Range    Glucose 127 (H) 70 - 99 mg/dL   Glucose by meter    Collection Time: 04/25/19 12:09 PM   Result Value Ref Range    Glucose 131 (H) 70 - 99 mg/dL   Blood gas venous    Collection Time: 04/25/19  2:40 PM   Result Value Ref Range    Ph Venous 7.37 7.32 - 7.43 pH    PCO2 Venous 51 (H) 40 - 50 mm Hg    PO2 Venous 33 25 - 47 mm Hg    Bicarbonate Venous 30 (H) 21 - 28 mmol/L    Base Excess Venous 3.5 mmol/L    FIO2 21    Hemoglobin    Collection Time: 04/25/19  2:40 PM   Result Value Ref Range    Hemoglobin 9.9 (L) 13.3 - 17.7 g/dL   Potassium    Collection Time: 04/25/19  4:10 PM   Result Value Ref Range    Potassium 3.6 3.4 - 5.3 mmol/L   Glucose by meter    Collection Time: 04/25/19  4:31 PM   Result Value Ref Range    Glucose 107 (H) 70 - 99 mg/dL   Glucose by meter    Collection Time: 04/25/19  8:26 PM   Result Value Ref Range    Glucose 181 (H) 70 - 99 mg/dL   Blood gas venous    Collection Time: 04/25/19 10:53 PM   Result Value Ref Range    Ph Venous 7.34 7.32 - 7.43 pH    PCO2 Venous 53 (H) 40 - 50 mm Hg    PO2 Venous 41 25 - 47 mm Hg    Bicarbonate Venous 28 21 - 28 mmol/L    Base Excess Venous 1.7 mmol/L    FIO2 21    Hemoglobin    Collection Time: 04/25/19 10:53 PM   Result Value Ref Range    Hemoglobin 10.4 (L) 13.3 - 17.7 g/dL   Glucose by meter    Collection Time: 04/26/19  1:51 AM   Result Value Ref Range    Glucose 118 (H) 70 - 99 mg/dL   Blood gas venous    Collection Time: 04/26/19  6:04 AM   Result Value Ref Range    Ph  Venous 7.36 7.32 - 7.43 pH    PCO2 Venous 52 (H) 40 - 50 mm Hg    PO2 Venous 33 25 - 47 mm Hg    Bicarbonate Venous 29 (H) 21 - 28 mmol/L    Base Excess Venous 3.0 mmol/L    FIO2 21    Hemoglobin    Collection Time: 04/26/19  6:04 AM   Result Value Ref Range    Hemoglobin 10.2 (L) 13.3 - 17.7 g/dL   Basic metabolic panel    Collection Time: 04/26/19  6:04 AM   Result Value Ref Range    Sodium 145 (H) 133 - 144 mmol/L    Potassium 3.6 3.4 - 5.3 mmol/L    Chloride 111 (H) 94 - 109 mmol/L    Carbon Dioxide 27 20 - 32 mmol/L    Anion Gap 7 3 - 14 mmol/L    Glucose 91 70 - 99 mg/dL    Urea Nitrogen 12 7 - 30 mg/dL    Creatinine 0.93 0.66 - 1.25 mg/dL    GFR Estimate 78 >60 mL/min/[1.73_m2]    GFR Estimate If Black >90 >60 mL/min/[1.73_m2]    Calcium 7.4 (L) 8.5 - 10.1 mg/dL   Magnesium    Collection Time: 04/26/19  6:04 AM   Result Value Ref Range    Magnesium 2.2 1.6 - 2.3 mg/dL   Glucose by meter    Collection Time: 04/26/19  8:42 AM   Result Value Ref Range    Glucose 75 70 - 99 mg/dL   Glucose by meter    Collection Time: 04/26/19 12:01 PM   Result Value Ref Range    Glucose 131 (H) 70 - 99 mg/dL   Glucose by meter    Collection Time: 04/26/19  5:05 PM   Result Value Ref Range    Glucose 157 (H) 70 - 99 mg/dL   Glucose by meter    Collection Time: 04/26/19  8:57 PM   Result Value Ref Range    Glucose 165 (H) 70 - 99 mg/dL   Glucose by meter    Collection Time: 04/27/19  2:27 AM   Result Value Ref Range    Glucose 99 70 - 99 mg/dL   Platelet count    Collection Time: 04/27/19  6:00 AM   Result Value Ref Range    Platelet Count 199 150 - 450 10e9/L   Basic metabolic panel    Collection Time: 04/27/19  6:00 AM   Result Value Ref Range    Sodium 145 (H) 133 - 144 mmol/L    Potassium 3.5 3.4 - 5.3 mmol/L    Chloride 112 (H) 94 - 109 mmol/L    Carbon Dioxide 27 20 - 32 mmol/L    Anion Gap 6 3 - 14 mmol/L    Glucose 78 70 - 99 mg/dL    Urea Nitrogen 10 7 - 30 mg/dL    Creatinine 0.91 0.66 - 1.25 mg/dL    GFR Estimate 81 >60  mL/min/[1.73_m2]    GFR Estimate If Black >90 >60 mL/min/[1.73_m2]    Calcium 7.4 (L) 8.5 - 10.1 mg/dL   Hemoglobin    Collection Time: 04/27/19  6:00 AM   Result Value Ref Range    Hemoglobin 9.7 (L) 13.3 - 17.7 g/dL   Procalcitonin    Collection Time: 04/27/19  6:00 AM   Result Value Ref Range    Procalcitonin 0.52 ng/ml   Magnesium    Collection Time: 04/27/19  6:22 AM   Result Value Ref Range    Magnesium 2.3 1.6 - 2.3 mg/dL   WBC count    Collection Time: 04/27/19  6:22 AM   Result Value Ref Range    WBC 8.5 4.0 - 11.0 10e9/L   Glucose by meter    Collection Time: 04/27/19  8:21 AM   Result Value Ref Range    Glucose 65 (L) 70 - 99 mg/dL   Glucose by meter    Collection Time: 04/27/19  8:41 AM   Result Value Ref Range    Glucose 85 70 - 99 mg/dL   Glucose by meter    Collection Time: 04/27/19  8:56 AM   Result Value Ref Range    Glucose 113 (H) 70 - 99 mg/dL   Lactic acid whole blood    Collection Time: 04/27/19 10:42 AM   Result Value Ref Range    Lactic Acid 2.3 (H) 0.7 - 2.0 mmol/L   Blood culture    Collection Time: 04/27/19 10:48 AM   Result Value Ref Range    Specimen Description Blood PICC     Special Requests Aerobic and anaerobic bottles received     Culture Micro No growth after 2 days    Blood culture    Collection Time: 04/27/19 11:07 AM   Result Value Ref Range    Specimen Description Blood Right Hand     Special Requests Received in aerobic bottle only     Culture Micro No growth after 2 days    Glucose by meter    Collection Time: 04/27/19 12:08 PM   Result Value Ref Range    Glucose 163 (H) 70 - 99 mg/dL   UA with Microscopic reflex to Culture    Collection Time: 04/27/19  1:23 PM   Result Value Ref Range    Color Urine Yellow     Appearance Urine Clear     Glucose Urine Negative NEG^Negative mg/dL    Bilirubin Urine Negative NEG^Negative    Ketones Urine Negative NEG^Negative mg/dL    Specific Gravity Urine 1.016 1.003 - 1.035    Blood Urine Negative NEG^Negative    pH Urine 5.0 5.0 - 7.0 pH     Protein Albumin Urine Negative NEG^Negative mg/dL    Urobilinogen mg/dL 0.0 0.0 - 2.0 mg/dL    Nitrite Urine Negative NEG^Negative    Leukocyte Esterase Urine Trace (A) NEG^Negative    Source Midstream Urine     WBC Urine 2 0 - 5 /HPF    RBC Urine <1 0 - 2 /HPF    Mucous Urine Present (A) NEG^Negative /LPF   Lactic acid whole blood    Collection Time: 04/27/19  2:10 PM   Result Value Ref Range    Lactic Acid 2.2 (H) 0.7 - 2.0 mmol/L   Glucose by meter    Collection Time: 04/27/19  4:33 PM   Result Value Ref Range    Glucose 133 (H) 70 - 99 mg/dL   Hemoglobin    Collection Time: 04/27/19  7:52 PM   Result Value Ref Range    Hemoglobin 11.0 (L) 13.3 - 17.7 g/dL   Glucose by meter    Collection Time: 04/27/19  9:04 PM   Result Value Ref Range    Glucose 179 (H) 70 - 99 mg/dL   Glucose by meter    Collection Time: 04/28/19  1:47 AM   Result Value Ref Range    Glucose 143 (H) 70 - 99 mg/dL   Blood gas venous    Collection Time: 04/28/19  5:00 AM   Result Value Ref Range    Ph Venous 7.36 7.32 - 7.43 pH    PCO2 Venous 50 40 - 50 mm Hg    PO2 Venous 29 25 - 47 mm Hg    Bicarbonate Venous 29 (H) 21 - 28 mmol/L    Base Excess Venous 2.4 mmol/L    FIO2 21    Procalcitonin    Collection Time: 04/28/19  5:00 AM   Result Value Ref Range    Procalcitonin 0.36 ng/ml   Basic metabolic panel    Collection Time: 04/28/19  5:00 AM   Result Value Ref Range    Sodium 146 (H) 133 - 144 mmol/L    Potassium 3.7 3.4 - 5.3 mmol/L    Chloride 114 (H) 94 - 109 mmol/L    Carbon Dioxide 26 20 - 32 mmol/L    Anion Gap 6 3 - 14 mmol/L    Glucose 115 (H) 70 - 99 mg/dL    Urea Nitrogen 12 7 - 30 mg/dL    Creatinine 0.98 0.66 - 1.25 mg/dL    GFR Estimate 74 >60 mL/min/[1.73_m2]    GFR Estimate If Black 85 >60 mL/min/[1.73_m2]    Calcium 7.6 (L) 8.5 - 10.1 mg/dL   Hemoglobin    Collection Time: 04/28/19  5:00 AM   Result Value Ref Range    Hemoglobin 10.7 (L) 13.3 - 17.7 g/dL   WBC count    Collection Time: 04/28/19  5:00 AM   Result Value Ref Range     WBC 11.1 (H) 4.0 - 11.0 10e9/L   Glucose by meter    Collection Time: 04/28/19  7:53 AM   Result Value Ref Range    Glucose 101 (H) 70 - 99 mg/dL   Lactic acid whole blood    Collection Time: 04/28/19  9:45 AM   Result Value Ref Range    Lactic Acid 1.4 0.7 - 2.0 mmol/L   Glucose by meter    Collection Time: 04/28/19 12:11 PM   Result Value Ref Range    Glucose 174 (H) 70 - 99 mg/dL   Glucose by meter    Collection Time: 04/28/19  5:03 PM   Result Value Ref Range    Glucose 112 (H) 70 - 99 mg/dL   Glucose by meter    Collection Time: 04/28/19  8:29 PM   Result Value Ref Range    Glucose 106 (H) 70 - 99 mg/dL   Glucose by meter    Collection Time: 04/29/19 12:16 AM   Result Value Ref Range    Glucose 92 70 - 99 mg/dL   Glucose by meter    Collection Time: 04/29/19  4:12 AM   Result Value Ref Range    Glucose 83 70 - 99 mg/dL   Basic metabolic panel    Collection Time: 04/29/19  5:30 AM   Result Value Ref Range    Sodium 144 133 - 144 mmol/L    Potassium 3.1 (L) 3.4 - 5.3 mmol/L    Chloride 111 (H) 94 - 109 mmol/L    Carbon Dioxide 26 20 - 32 mmol/L    Anion Gap 7 3 - 14 mmol/L    Glucose 86 70 - 99 mg/dL    Urea Nitrogen 10 7 - 30 mg/dL    Creatinine 0.97 0.66 - 1.25 mg/dL    GFR Estimate 75 >60 mL/min/[1.73_m2]    GFR Estimate If Black 87 >60 mL/min/[1.73_m2]    Calcium 7.7 (L) 8.5 - 10.1 mg/dL   WBC count    Collection Time: 04/29/19  5:30 AM   Result Value Ref Range    WBC 9.0 4.0 - 11.0 10e9/L   Hemoglobin    Collection Time: 04/29/19  5:30 AM   Result Value Ref Range    Hemoglobin 10.9 (L) 13.3 - 17.7 g/dL   Magnesium    Collection Time: 04/29/19  5:30 AM   Result Value Ref Range    Magnesium 2.0 1.6 - 2.3 mg/dL   Phosphorus    Collection Time: 04/29/19  5:30 AM   Result Value Ref Range    Phosphorus 2.1 (L) 2.5 - 4.5 mg/dL   Glucose by meter    Collection Time: 04/29/19  7:54 AM   Result Value Ref Range    Glucose 61 (L) 70 - 99 mg/dL   Glucose by meter    Collection Time: 04/29/19  8:30 AM   Result Value  Ref Range    Glucose 85 70 - 99 mg/dL        Physical Exam   Temp: 96  F (35.6  C) Temp src: Oral BP: 110/54 Pulse: 91 Heart Rate: 88 Resp: 20 SpO2: 93 % O2 Device: None (Room air)    Vitals:    04/26/19 0613 04/27/19 0252 04/28/19 0100   Weight: 134.4 kg (296 lb 4.8 oz) 137.4 kg (302 lb 14.6 oz) 137 kg (302 lb 0.5 oz)     Vital Signs with Ranges  Temp:  [96  F (35.6  C)-100.6  F (38.1  C)] 96  F (35.6  C)  Pulse:  [88-91] 91  Heart Rate:  [87-97] 88  Resp:  [17-20] 20  BP: ()/(41-59) 110/54  SpO2:  [92 %-95 %] 93 %  I/O last 3 completed shifts:  In: 2337 [P.O.:480; I.V.:1857]  Out: 440 [Emesis/NG output:40; Stool:400]    Constitutional: Alert, oriented, moderate distress  Respiratory: Clear to auscultation bilaterally  Cardiovascular: regular rate and rhythm  GI: distended, metalic sound noted unable to palpate internal organ due to distention  Skin/Integumen: no rash or bruise. Excoriation noted on the scrotal area   Other:      Medications     dextrose 5% and 0.9% NaCl with potassium chloride 20 mEq 100 mL/hr at 04/29/19 1056     - MEDICATION INSTRUCTIONS -         amoxicillin-clavulanate  1 tablet Oral Q12H ALYSSA     heparin lock flush  5-10 mL Intracatheter Q24H     insulin aspart  1-6 Units Subcutaneous Q4H     insulin glargine  10 Units Subcutaneous At Bedtime     insulin glargine  15 Units Subcutaneous QAM AC     pantoprazole (PROTONIX) IV  40 mg Intravenous Daily with breakfast     sodium chloride (PF)  10 mL Intravenous Q8H       Recent Results (from the past 24 hour(s))   CT Abdomen pelvis w contrast*    Narrative    CT ABDOMEN AND PELVIS WITH CONTRAST 4/28/2019 12:08 PM    TECHNIQUE: Images from diaphragm to pubic symphysis, 100 mL Isovue 370  IV contrast. Radiation dose for this scan was reduced using automated  exposure control, adjustment of the mA and/or kV according to patient  size, or iterative reconstruction technique.    HISTORY: Recurrent fever, worsening abdominal pain and distention  with  x-ray showing increased colon gas pattern in patient with recent  septic shock with suspected infectious versus ischemic colitis  etiology.    COMPARISON: 4/28/2019 x-ray and 4/22/2019 CT abdomen and pelvis    FINDINGS: Moderate bilateral pleural effusions have developed with  mild associated atelectasis. There is diffuse distention of the colon  with gas and air-fluid levels. The bowel wall thickening involving  distal colon on 4/22/2019 appears improved, the colon is more  distended currently. There is minimal colon wall thickening identified  in the sigmoid colon. Small fluid in the left paracolic gutter. No  evidence for obstructing lesion. There is no pneumatosis. Right colon  is incompletely included on this exam but distended where visualized.  No evidence for small bowel distention. No evidence for portal venous  air or free air.    Cholecystectomy clips. Fullness and curvilinear densities which could  represent postop changes near the pancreatic tail which partially  obscure the pancreatic tail. This appears lobulated and slightly  prominent but could just represent postsurgical change. Clinical  correlation with surgical history. The pancreatic head, body and  proximal tail unremarkable. Liver, spleen, adrenal glands and right  kidney appear normal. 2 cm hyperdense lesion along the posterior left  kidney is indeterminate and could represent a complicated left renal  cyst versus solid left exophytic renal mass.    No periaortic or pelvic adenopathy. Postop changes proximal left  femur.      Impression    IMPRESSION:   1. Moderate bilateral pleural effusions.  2. Diffuse colonic distention significantly increased since 4/22/2019.  Previously seen distal left colon bowel wall thickening consistent  with colitis is improved and nearly resolved with residual minimal  distal colon wall thickening.  3. No obstructing lesion or evidence for pneumatosis. This could  represent a diffuse colonic ileus.  4.  Pancreatic tail is not optimally visualized, there is curvilinear  high attenuation and some lobulation and fullness in the region of the  superior pancreatic tail which is incompletely assessed on this exam.    FARNAZ CARRASCO MD

## 2019-04-30 PROBLEM — K56.7 ILEUS (H): Status: ACTIVE | Noted: 2019-01-01

## 2019-04-30 PROBLEM — L30.9 DERMATITIS: Status: ACTIVE | Noted: 2019-01-01

## 2019-04-30 NOTE — PROGRESS NOTES
NUTRITION NOTE     Pt downgraded from full liquids due to colon thickening and stool issues to NPO 4/28 and Clears 4/29.   Ordered Boost breeze with meals as pt NPO/liquids only for 9 days, requires energy and protein    Dany Carroll RDN, LD  Clinical Dietitian  Phone: [phone]

## 2019-04-30 NOTE — PLAN OF CARE
Pt a/o, denies any abdomen pain, blanchable redness to buttocks area, Dr. Dewitt assessed area as well and did not see any open sores or pressure areas, up in recliner for meals, replaced potassium this am and rechecked was 3.8, LS diminished, telemetry NSR, eating clear liquid diet, appetite fair, pt feeding himself, VSS, afebrile today, D5/0.9%/20 mEq/ at 50/hr, , 151, 164, antibiotics dc'd, rash area noted on right lower abdomen today and marked, Dr. Dewitt aware, Dr. Carpenter called and stated to continue with clear diet, PT/OT, no surgery at this time, and to replace electrolytes as necessary.

## 2019-04-30 NOTE — PROGRESS NOTES
"GENERAL SURGERY PROGRESS NOTE    Name:  Derek Aragon Date/Time of Admission: 2019  2:28 PM   CSN: [id number]  Attending Provider: Celso Dewitt MD   Room/Bed:  [place] [address]-01 : 1940 78 year old       2019   HD #10 AMS, septic shock, ischemic colitis    SUBJECTIVE:     Patient seen and examined. Pt is the most alert since admission.  Tolerating clears; minimal pain.  No flatus per the patient; BM per I/O recording.      Current Meds:  Scheduled Meds:    heparin lock flush  5-10 mL Intracatheter Q24H     insulin aspart  1-6 Units Subcutaneous TID w/meals     insulin glargine  10 Units Subcutaneous At Bedtime     insulin glargine  15 Units Subcutaneous QAM AC     pantoprazole (PROTONIX) IV  40 mg Intravenous Daily with breakfast     sodium chloride (PF)  10 mL Intravenous Q8H     Continuous Infusions:    dextrose 5% and 0.9% NaCl with potassium chloride 20 mEq 50 mL/hr at 19 1121     - MEDICATION INSTRUCTIONS -       PRN Meds:.acetaminophen, glucose **OR** dextrose **OR** glucagon, heparin lock flush, lidocaine 4%, lidocaine (buffered or not buffered), magnesium sulfate, - MEDICATION INSTRUCTIONS -, naloxone, ondansetron **OR** ondansetron, oxyCODONE **OR** oxyCODONE, potassium chloride, potassium chloride with lidocaine, potassium chloride, potassium chloride, potassium chloride, potassium phosphate (KPHOS) in D5W IV, potassium phosphate (KPHOS) in D5W IV, potassium phosphate (KPHOS) in D5W IV, potassium phosphate (KPHOS) in D5W IV, prochlorperazine **OR** prochlorperazine **OR** prochlorperazine, sodium chloride (PF)     OBJECTIVE:     Vital Signs:  /55 (BP Location: Right arm)   Pulse 86   Temp 98.8  F (37.1  C) (Oral)   Resp 24   Ht 1.753 m (5' 9\")   Wt 137.1 kg (302 lb 4 oz)   SpO2 97%   BMI 44.63 kg/m       I/O:    I/O last 3 completed shifts:  In:  [P.O.:245; I.V.:2282]  Out: -    I/O this shift:  In: 716 [P.O.:200; I.V.:516]  Out: -       PHYSICAL EXAMINATION   General  " Alert but confused.     Lungs normal chest wall excursion   Heart S1, S2   Abdominal Soft; mildly distended; morbidly obese; generalized pain on deep palpation   Extremities No gross deformities noted appreciated    Incisions n/a     Labs:   Last CBC w diff Results:  Recent Labs   Lab 04/30/19 0530 04/29/19 0530 04/28/19 0500 04/27/19  0600 04/24/19  0435   WBC 9.8 9.0 11.1*   < >  --    < >  --    HCT 32.1*  --   --   --   --   --   --      --   --   --  199  --  266   RBC 3.45*  --   --   --   --   --   --    MCV 93  --   --   --   --   --   --    MCHC 31.5  --   --   --   --   --   --    MCH 29.3  --   --   --   --   --   --    RDW 14.5  --   --   --   --   --   --     < > = values in this interval not displayed.    Last Renal Function Results:  Recent Labs   Lab 04/30/19 0530 04/29/19 0530 04/28/19  0500 04/24/19  0435   * 144 146*   < > 144   CO2 23 26 26   < > 23   BUN 9 10 12   < > 25   PHOS 2.5 2.1*  --   --   --    ALBUMIN  --   --   --   --  2.2*    < > = values in this interval not displayed.                Last Liver Function/Coags/Cardiac Results:  Recent Labs   Lab 04/24/19  0435   ALT 10   AST 8   BILITOTAL 0.5   ALKPHOS 63    Microbiology:  [unfilled]   [unfilled]      Imaging Studies: [unfilled]    ASSESSMENT:     Principal Problem:    Ischemic colitis (H)  Active Problems:    Peripheral vascular disease (H)    History of CVA (cerebrovascular accident) wit left hemiparesis    Essential hypertension with goal blood pressure less than 140/90    Obesity, morbid, BMI 40.0-49.9 (H)    Type 2 diabetes mellitus with complication, with long-term current use of insulin (H)    Diarrhea    Metabolic acidosis, increased anion gap    Septic shock (H)    Encephalomalacia 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    Replace K+ - currently at 3.3; wound replace Mg  PT/OT and get out of bed.    No indication for surgery at this point  Will follow peripherally; I will see him in the office in 2-4 weeks to set up an elective colonoscopy.      Time talking to patient including looking up records and talking to other physicians and nurses about patient care is 15 mins .    Electronically signed by:  Homero Carpenter MD  4/23/2019

## 2019-04-30 NOTE — PLAN OF CARE
S-(situation): OT consult for evaluation and treatment     B-(background): Order for weakness; patient is a 78 year old male who was admitted with septic shock due to acute ischemic bowel and colitis suspected per MD note.      A-(assessment): PT current recommendation for TCU.  Acknowledge OT order with PT stating to hold on OT evaluation until PT completed    R-(recommendations): Schedule for tomorrow.    FÉLIX Hernandez/L  [facility]  [phone]

## 2019-04-30 NOTE — PROGRESS NOTES
04/30/19 1400   Quick Adds   Type of Visit Initial Occupational Therapy Evaluation   Living Environment   Lives With spouse   Living Arrangements house   Home Accessibility   (ramp to enter home. Stairs within the home -does not use)   Transportation Anticipated agency   Living Environment Comment Wife available 24/7; PCA daily for 7 hours per day to assist with ADL   Self-Care   Usual Activity Tolerance poor   Current Activity Tolerance poor   Regular Exercise No   Equipment Currently Used at Home wheelchair, power;wheelchair, manual;transfer board;shower chair;commode;grab bar, toilet;grab bar, tub/shower;lift device;hospital bed   Functional Level   Ambulation 3-->assistive equipment and person  (wheelchair bound with powerchair)   Transferring 3-->assistive equipment and person  (ted lift; assist of 2)   Toileting 3-->assistive equipment and person  (assist of 2 to commode with pivot transfer)   Bathing 3-->assistive equipment and person  (assist of 2 and shower chair)   Dressing 3-->assistive equipment and person  (assist of 2)   Eating 3-->assistive equipment and person   Communication 0-->understands/communicates without difficulty   Swallowing 0-->swallows foods/liquids without difficulty   Cognition 0 - no cognition issues reported   Fall history within last six months no   Which of the above functional risks had a recent onset or change? transferring   General Information   Onset of Illness/Injury or Date of Surgery - Date 04/21/19   Referring Physician Dr. Byers   Patient/Family Goals Statement Return home   Additional Occupational Profile Info/Pertinent History of Current Problem Pt is a 78 year old male being seen for OT evaluation after being admitted [facility] septic shock on 4/21/19. Pt was in ICU previously during this inpatient stay. Pt has a previous medical history of HTN, restless leg syndrome, MIO with CPAP, DM type 2 with peripheral neuropathy, depression, obesity, CVA with left hemiparesis  "and aphasia, PVD, AFib, right femoral fracture in 2016.    Pain Assessment   Patient Currently in Pain No   Bathing   Level of Harmon dependent (less than 25% patients effort)   Physical Assist/Nonphysical Assist 2 person assist   Upper Body Dressing   Level of Harmon: Dress Upper Body maximum assist (25% patients effort)   Physical Assist/Nonphysical Assist: Dress Upper Body 2 person assist   Lower Body Dressing   Level of Harmon: Dress Lower Body dependent (less than 25% patients effort)   Physical Assist/Nonphysical Assist: Dress Lower Body 2 person assist   Toileting   Level of Harmon: Toilet dependent (less than 25% patients effort)   Physical Assist/Nonphysical Assist: Toilet 2 person assist   Grooming   Level of Harmon: Grooming moderate assist (50% patients effort)   Physical Assist/Nonphysical Assist: Grooming 1 person assist   Eating/Self Feeding   Level of Harmon: Eating minimum assist (75% patients effort)   Physical Assist/Nonphysical Assist: Eating 1 person assist;set-up required   Instrumental Activities of Daily Living (IADL)   IADL Comments Pt's wife completes all IADL tasks   Clinical Impression   Criteria for Skilled Therapeutic Interventions Met evaluation only;current level of function same as previous level of function   Clinical Decision Making (Complexity) Low complexity   Anticipated Discharge Disposition Transitional Care Facility;Long Term Care Facility   Risks and Benefits of Treatment have been explained. Yes   Patient, Family & other staff in agreement with plan of care Yes   Clinical Impression Comments Pt appears to be at his baseline for performance of ADL tasks. Based on pt's current and previous level of function, pt would benefit from LTC placement to ensure safety and to provide pt with level of assist required for ADL tasks.    [facility] AM-PAC TM \"6 Clicks\"   2016, Trustees of [facility], under license to [email or web address].  All " "rights reserved.   6 Clicks Short Forms Daily Activity Inpatient Short Form   [facility] AM-PAC  \"6 Clicks\" Daily Activity Inpatient Short Form   1. Putting on and taking off regular lower body clothing? 1 - Total   2. Bathing (including washing, rinsing, drying)? 1 - Total   3. Toileting, which includes using toilet, bedpan or urinal? 1 - Total   4. Putting on and taking off regular upper body clothing? 1 - Total   5. Taking care of personal grooming such as brushing teeth? 2 - A Lot   6. Eating meals? 3 - A Little   Daily Activity Raw Score (Score out of 24.Lower scores equate to lower levels of function) 9   Total Evaluation Time   Total Evaluation Time (Minutes) 17     Demetra Ocasio OTR/L  [facility]  [phone]  "

## 2019-04-30 NOTE — PLAN OF CARE
Max temp of 99.7 oral.  Tele NSR.  Sats good on room air, pt wearing own cpap.  Has tolerated sips of clears and a few bites of jello.  Incontinent of urine, 1 loose stool overnight.  Phos and Mg need no replacement this am, but K+ needing replacement per protocol.  AO, answers questions slowly but appropriately.  Pt was up in chair for approximately 4 hours last evening, ted lift used for transferring.  T & R q 2hrs.  Blood sugars 141 to 203, lantus given at hs and sliding scale as ordered.

## 2019-04-30 NOTE — PLAN OF CARE
Discharge Planner OT   Patient plan for discharge: Home  Current status: OT eval completed. Pt currently requires assist of 2 for all ADL tasks, including bathing, toileting, bed mobility, dressing, and functional mobility. Pt was previously receiving assist of 2 and using ted lift at home for ADL tasks. Therefore, pt appears to be at his baseline for performance of ADL. Physical therapy is addressing mobility needs and ROM.   Barriers to return to prior living situation: Medical needs; level of assist  Recommendations for discharge: LTC  Rationale for recommendations: Based on pt's current and previous level of function, pt would benefit from LTC placement to ensure safety and to provide pt with level of assist required for ADL tasks.        Entered by: Demetra Ocasio 04/30/2019 2:44 PM

## 2019-04-30 NOTE — PROGRESS NOTES
Update Marilynn, at [facility], that patient is not medically ready for discharge today.  Updated patient's wife, Mattie, by phone.    RAYSA Jean-Baptiste  [facility] [phone]/ [facility] [phone]

## 2019-04-30 NOTE — PLAN OF CARE
Discharge Planner PT   Patient plan for discharge: Home  Current status: Completed bed to recliner ceiling lift transfer, A2 with patient managing LUE. Completed short sitting RLE and RUE AROM tasks to promote joint mobility and maintain strength. Patient minimally verbalizing needs. Encouraged to drink boost, however patient not agreeable to the taste of the beverage, nurse made aware.  Barriers to return to prior living situation: Medical status, impaired functional mobility, level of physical assistance for baseline mobility  Recommendations for discharge: TCU, wheelchair transport with ted transfer into patient's wheelchair  Rationale for recommendations: Patient remains with low activity tolerance and low motivation, however completed extremity ROM tasks, continues to refuse LUE or LLE intervention. Given patient's report of baseline needs and current presentation patient would benefit from TCU placement to improve his ability to participate in his cares. Anticipate progression to long term care placement due to co morbidities and functional capacity expectations.       Entered by: Rosa Sahu 04/30/2019 12:01 PM

## 2019-04-30 NOTE — PROGRESS NOTES
[facility]    Medicine Progress Note - Hospitalist Service       Date of Admission:  4/21/2019  Assessment & Plan    78-year-old man admitted with ischemic colitis and septic shock that continues to improve.  Ileus also continues to gradually improved and may be exacerbated by electrolyte disturbances including hypopotassemia that has persisted today.  Hypophosphatemia has improved after supplementation yesterday.  He continues to have diarrhea which may be aggravated by antibiotics.  He has new erythematous skin areas that are nonspecific but raise question for possible yeast dermatitis particularly in the context of broad-spectrum antibiotics for the last 10 days.  Previous acute kidney injury appears to have resolved.  Glycemic control of diabetes has been adequate.  Anemia has been stable without ongoing active bleeding.    Principal Problem:    Ischemic colitis (H)  Active Problems:    Septic shock (H)    Peripheral vascular disease (H)    Type 2 diabetes mellitus with complication, with long-term current use of insulin (H)    Diarrhea    Metabolic acidosis, increased anion gap    Acute kidney injury (H)    Hematochezia    Paroxysmal atrial fibrillation (H) with aberrant conduction    Hypomagnesemia    Anemia due to blood loss, acute - admit hgb 14.8    Hypokalemia    Ileus (H)    MIO (obstructive sleep apnea)    History of CVA (cerebrovascular accident) wit left hemiparesis    Essential hypertension with goal blood pressure less than 140/90    Obesity, morbid, BMI 40.0-49.9 (H)    Encephalomalacia with old cerebral infarction (H) right MCA territory    Aphasia as late effect of stroke    Leukocytosis    Discontinue Augmentin having completed 10 days of broad-spectrum antibiotic therapy and recheck procalcitonin today  Reduce IV fluid rate to 50 mL's per hour, continue clear liquid diet for now  Discontinue telemetry  Replace potassium according to protocol  Add topical nystatin  to red skin areas    Diet: Clear Liquid Diet  Snacks/Supplements Adult: Boost Breeze; With Meals    DVT Prophylaxis: Enoxaparin (Lovenox) SQ and Pneumatic Compression Devices  Cast Catheter: not present  Code Status: DNR/DNI      Disposition Plan   Expected discharge: 2 - 3 days, recommended to transitional care unit once Medically stable.  Entered: Celso Dewitt MD 04/30/2019, 10:30 AM       The patient's care was discussed with the Bedside Nurse, Patient and Patient's Family.    Celso Dewitt MD  Hospitalist Service  [facility]    ______________________________________________________________________    Interval History   He had no new events overnight.  He does not appear to offer complaints although during examination does say that he has some tenderness in his right lower abdomen.  Nursing has noted some new red skin spots in his groin, left hip area, and right lower abdomen.  Perianal skin also has been red although he is having frequent watery diarrhea.  Diarrhea has not been bloody.  He had low-grade fever to 100.8 yesterday afternoon.  He remains hemodynamically stable.  He has been tachypneic but has had adequate oxygenation without supplementation.  He is tolerating clear liquid diet without difficulty.    Data reviewed today: I reviewed all medications, new labs and imaging results over the last 24 hours. I personally reviewed telemetry which has not demonstrated any recurrent dysrhythmias for the last day.    Physical Exam   Vital Signs: Temp: 97.3  F (36.3  C) Temp src: Oral BP: 122/53 Pulse: 94 Heart Rate: 90 Resp: 24 SpO2: 94 % O2 Device: None (Room air)    Weight: 302 lbs 4.01 oz  General Appearance: Morbidly obese, no acute distress lying in bed  Respiratory: Normal respiratory effort, diminished breath sounds, clear lungs  Cardiovascular: Regular rate and rhythm  GI: Hypoactive somewhat tympanitic bowel sounds, morbidly obese abdomen, tenderness in the right  lower abdomen  Skin: Large oval erythematous patch on the right lower anterior abdominal wall that is mildly tender and indurated, less erythematous patchy areas on the lateral left hip and in the inguinal regions bilaterally, no pustules or vesicles, no bullae, no urticaria seen, perianal skin is erythematous without definite open lesions  Other: Moderate pitting edema in the lower legs bilaterally    Data   Recent Labs   Lab 04/30/19  0530 04/29/19  1200 04/29/19  0530 04/28/19  0500  04/27/19  0600  04/24/19  0435   WBC 9.8  --  9.0 11.1*   < >  --    < >  --    HGB 10.1*  --  10.9* 10.7*   < > 9.7*   < > 10.8*   MCV 93  --   --   --   --   --   --   --      --   --   --   --  199  --  266   *  --  144 146*  --  145*   < > 144   POTASSIUM 3.3* 3.5 3.1* 3.7  --  3.5   < > 3.8   CHLORIDE 116*  --  111* 114*  --  112*   < > 110*   CO2 23  --  26 26  --  27   < > 23   BUN 9  --  10 12  --  10   < > 25   CR 0.96  --  0.97 0.98  --  0.91   < > 1.35*   ANIONGAP 7  --  7 6  --  6   < > 11   MAKENNA 7.5*  --  7.7* 7.6*  --  7.4*   < > 7.6*   *  --  86 115*  --  78   < > 138*   ALBUMIN  --   --   --   --   --   --   --  2.2*   PROTTOTAL  --   --   --   --   --   --   --  5.7*   BILITOTAL  --   --   --   --   --   --   --  0.5   ALKPHOS  --   --   --   --   --   --   --  63   ALT  --   --   --   --   --   --   --  10   AST  --   --   --   --   --   --   --  8    < > = values in this interval not displayed.     Blood cultures repeated 3 days ago are negative to date    Phosphorus 2.5 today    Blood sugars ranged 109-203 over the last day    Medications     dextrose 5% and 0.9% NaCl with potassium chloride 20 mEq 100 mL/hr at 04/30/19 0804     - MEDICATION INSTRUCTIONS -         amoxicillin-clavulanate  1 tablet Oral Q12H ALYSSA     heparin lock flush  5-10 mL Intracatheter Q24H     insulin aspart  1-6 Units Subcutaneous Q4H     insulin glargine  10 Units Subcutaneous At Bedtime     insulin glargine  15 Units  Subcutaneous QAM AC     pantoprazole (PROTONIX) IV  40 mg Intravenous Daily with breakfast     sodium chloride (PF)  10 mL Intravenous Q8H

## 2019-05-01 NOTE — PLAN OF CARE
Patients status is improving.  Discontinued central line this morning, tolerated that without any difficulties.  Advanced his diet to a moderate carb diet, think he still prefers softer foods.  Added carb counting to meal sliding scale coverage for meals. Potassium replaced this morning with p.o., re check not due until morning.  Rash on abdomen and groin area lightening up, now rash has spread to left shoulder, chest area and an area on his right knee which Dr. Dewitt did observe and did order some hydrocortisone cream for. Dr. Dewitt stated he is thinking it is more of a reaction to the medications he has been receiving and doesn't think that it will hold up his discharged to [facility] planned for tomorrow morning.

## 2019-05-01 NOTE — PLAN OF CARE
Discharge Planner PT   Patient plan for discharge: TCU  Current status: Arnulfo transfer from recliner to bed with assistance of 2 for safety and 1 to manage control. Patient required verbal cues to recall management of LUE. Arnulfo used to roll left, unable to maintain position with right hand on bed rail. Assistance of 1 to maintain left rolled position, assist of 2 to roll to the right. Completed RUE and RLE AROM tasks with improved endurance. Agreeable to positioning of LUE and LLE. Left shin observed oozing clear fluids.   Barriers to return to prior living situation: Impaired functional mobility, medical status, increased level of physical assistance for baseline mobility.   Recommendations for discharge: TCU with wheelchair transport (arnulfo transfer into chair).  Rationale for recommendations: Patient with improved command following and agreeable to tasks this date. Given patient's report of baseline needs and current presentation patient would benefit from TCU placement to improve his ability to participate in his cares. Anticipate progression to long term care placement due to co morbidities and functional capacity expectations.       Entered by: Rosa Sahu 05/01/2019 1:40 PM

## 2019-05-01 NOTE — PROGRESS NOTES
[facility]    Medicine Progress Note - Hospitalist Service       Date of Admission:  4/21/2019  Assessment & Plan    78-year-old man admitted due to septic shock associate with ischemic colitis.  Signs of infection have resolved and he has completed 10 days of antibiotic therapy.  Hematochezia has resolved and his anemia has been stable without ongoing suspected active bleeding.  Diarrhea persists but is improving and ileus clinically appears to be resolving.  Acute kidney injury has resolved.  He continues to have hypopotassemia likely exacerbated by diarrhea and he has had episodes of paroxysmal atrial fibrillation during this hospital stay that could be aggravated by hypopotassemia.  He has developed a patchy red skin rash over the last 2 days of unclear specific cause, but this does not appear to be a severe dermatitis.  Yeast dermatitis associate with antibiotic therapy seems most likely.  Glycemic control of diabetes is starting to worsen as his oral intake advances although his diabetes is usually well controlled, but he has not yet restarted his previous higher doses of insulin.  Overall, he is much improved.    Principal Problem:    Ischemic colitis (H)  Active Problems:    Septic shock (H)    Type 2 diabetes mellitus with complication, with long-term current use of insulin (H)    Diarrhea    Acute kidney injury (H)    Paroxysmal atrial fibrillation (H) with aberrant conduction    Hypomagnesemia    Anemia due to blood loss, acute - admit hgb 14.8    Hypokalemia    Ileus (H)    Dermatitis - large red patch RLQ    MIO (obstructive sleep apnea)    Peripheral vascular disease (H)    History of CVA (cerebrovascular accident) wit left hemiparesis    Essential hypertension with goal blood pressure less than 140/90    Obesity, morbid, BMI 40.0-49.9 (H)    Metabolic acidosis, increased anion gap    Encephalomalacia with old cerebral infarction (H) right MCA territory    Aphasia as  late effect of stroke    Leukocytosis    Hematochezia    Switch IV to PO Protonix  Replace K per protocol  Remove CVC today  Advance diet, add prandial Novolog  Use topical nystatin and topical hydrocortisone cream to skin rash as needed    Diet: Clear Liquid Diet  Snacks/Supplements Adult: Boost Breeze; With Meals    DVT Prophylaxis: Enoxaparin (Lovenox) SQ and Pneumatic Compression Devices  Cast Catheter: not present  Code Status: DNR/DNI      Disposition Plan   Expected discharge: Tomorrow, recommended to transitional care unit once Medically stable.  Entered: Cleso Dewitt MD 05/01/2019, 10:41 AM       The patient's care was discussed with the Bedside Nurse, Care Coordinator/ and Patient.    Celso Dewitt MD  Hospitalist Service  [facility]    ______________________________________________________________________    Interval History   Patient has no complaints today.  He appears to be feeling better overall.  He has not had fever.  He continues to be hemodynamically stable.  Oxygenation has been normal.  He has been wearing his home CPAP at night during sleep.  He is tolerating good oral liquid intake without difficulty.  He denies any abdominal pain or nausea.  He continues to have diarrhea, but it is lessening.  He has not had any grossly bloody stools.  He is voiding spontaneously.  He continues to have a patchy red skin rash.  He sometimes seems to itch areas of his skin which aggravates the redness but he presently denies any skin itching.    Data reviewed today: I reviewed all medications, new labs and imaging results over the last 24 hours. I personally reviewed no images or EKG's today.    Physical Exam   Vital Signs: Temp: 97.6  F (36.4  C) Temp src: Oral BP: 114/50 Pulse: 79 Heart Rate: 83 Resp: 20 SpO2: 95 % O2 Device: None (Room air)    Weight: 307 lbs 5.14 oz  General Appearance: No acute distress sitting up in a chair  Respiratory: Normal  respiratory effort, clear lungs  Cardiovascular: Regular rate and rhythm  GI: Morbidly obese abdomen, soft, normal bowel sounds, no abdominal tenderness  Skin: Ill-defined patchy red areas most obvious on the left upper chest and shoulder at this time without warmth or erythema, previous redness on the right lower abdomen appears to have significantly receded from previously inked margins if not resolved, no urticaria, no vesicles, no pustules, no bullae  Other: Alert and follows simple instructions, aphasic    Data   Recent Labs   Lab 05/01/19  0558 04/30/19  1200 04/30/19  0530  04/29/19  0530  04/27/19  0600   WBC 9.6  --  9.8  --  9.0   < >  --    HGB 9.9*  --  10.1*  --  10.9*   < > 9.7*   MCV 94  --  93  --   --   --   --      --  321  --   --   --  199   *  --  146*  --  144   < > 145*   POTASSIUM 3.5  3.5 3.8 3.3*   < > 3.1*   < > 3.5   CHLORIDE 118*  --  116*  --  111*   < > 112*   CO2 24  --  23  --  26   < > 27   BUN 7  --  9  --  10   < > 10   CR 0.95  --  0.96  --  0.97   < > 0.91   ANIONGAP 4  --  7  --  7   < > 6   MAKENNA 7.5*  --  7.5*  --  7.7*   < > 7.4*   *  --  150*  --  86   < > 78    < > = values in this interval not displayed.     Procalcitonin yesterday 0.24, improved from April 28  Blood cultures remain negative for the last 4 days    Blood sugars have ranged 119-187 over the last day    Medications     dextrose 5% and 0.9% NaCl with potassium chloride 20 mEq Stopped (05/01/19 0936)     - MEDICATION INSTRUCTIONS -         heparin lock flush  5-10 mL Intracatheter Q24H     insulin aspart  1-6 Units Subcutaneous TID w/meals     insulin glargine  10 Units Subcutaneous At Bedtime     insulin glargine  15 Units Subcutaneous QAM AC     nystatin   Topical BID     pantoprazole (PROTONIX) IV  40 mg Intravenous Daily with breakfast     sodium chloride (PF)  10 mL Intravenous Q8H

## 2019-05-01 NOTE — PLAN OF CARE
Patient is alert and oriented x4. Patient reports pain in bilateral legs that he has experienced in the past, stating they are sore. Oxycodone was administered and reported to be effective. Patient had one large loose stool. Vital signs are stable, afebrile. Will continue to monitor.

## 2019-05-01 NOTE — PROGRESS NOTES
Writer has left a message with patient's wife, Mattie, regarding discharge planning.  She called right back.  Discussed anticipated discharge to the TCU at [facility] for tomorrow.  Mattie ok with this.  She has requested [facility] for transportation.  Writer has contacted [facility] and they are unable to transport, as they have no openings for tomorrow.  Writer has called and left a message with Loretta for possible transport tomorrow.  Will await call back.     Wife, Mattie, stated that she plans to contact Marilynn in admissions at [facility] regarding bringing in some things for Derek and plans to talk with his hospital nurse this evening regarding a rash Derek has.  Writer did mention to Mattie that the day shift nurse did talk with Dr. Dewitt today about the rash during morning rounds, so he was aware.       Care Transitions will continue to follow for discharge planning.

## 2019-05-01 NOTE — PROGRESS NOTES
"CLINICAL NUTRITION SERVICES - REASSESSMENT NOTE      RECOMMENDATIONS FOR MD/PROVIDER TO ORDER: Advance diet as tolerated when safe, to promote intake   Recommendations Ordered by Registered Dietitian (RD):  -Boost Breeze 3 times per day with meals to enhance  -When diet advances, Boost Plus (chocolate or vanilla) between meals (2-3/day)   Future/Additional Recommendations:   -Continue Nutrition supplements at nursing home/rehab facility until diet fully advanced and intake >75%   Malnutrition: None diagnosed. (please carry forward if malnutrition diagnosed)     EVALUATION OF PROGRESS TOWARD GOALS   Diet:  Clear liquids  Boost Breeze with meals    Intake/Tolerance:  Pt is feeding self, reports appetite \"okay\". Tolerating clear liquids w/o issue. 795 mLs po reported yesterday. 50% Breakfast. 25% Lunch, and 50% dinner charted for clear liquid meal trays yesterday 4/30.   Reports likes and tolerating Boost Breeze well. Willing to return to Boost Plus when diet advanced.      ASSESSED NUTRITION NEEDS: See prior assessment  Dosing Weight: Most accurate is 124 kg admit weight. Adjusted BW of 85.5 kg.   Weight 5/1 today is 139.4 kg, up 15.4 kg from admission, due to +25 Liters.     NEW FINDINGS:   Pt's sepsis has resolved. Found to have colonic ileus, reduced from full liquids to clear liquids 4/28, continues on clear liquids. Added Boost Breeze 4/30 for additional protein/energy, as pt has been NPO/liquids only for 10 days now.   Surgeon/provider hope to advance diet to full liquids today 5/1.   Large loose stool reported 5/1.     Plan is to [facility] to [facility] tomorrow 5/2.     Labs: sodium up 146, Potassuim 3.5 - replacing. Renals good, , 131, 119 so far today. Yesterday low of .  Meds: D5NaCl with potassium running 50 mL/hr. Long acting insulin AM and PM + sliding scale insulin continues.     Provider order in for weight loss/obesity education from Dr. Carpenter.   Pt not appropriate for weight loss education " at this time due to:   NPO/liquid only for 10 days - do not want to limit intake at this time.  At visit, pt did not want to open eyes or speak much with me. Wife handles all cares at home, and she would be the key learner for such education.   According to prior history taken with wife, pt's appetite is moderate, she limits his snacking, only uses Boost when misses a meal, good blood sugar control.   Despite obesity pt has not gained weight over time.   Pt is not appropriate for weight loss education at this time. Please re-consult if needed.       Previous Goals:   -Intake 75% or greater of meals  -Consume 1 or more Boost/day while on Full liquid diet    Evaluation: Not met - pt eating 25-75% of meals only. Diet Downgraded to Clear Liquid.     Previous Nutrition Diagnosis:   Inadequate oral intake related to acute illness as evidenced by NPO 4 days, diet limited to liquids due to ischemic colitis diagnosis.     Evaluation: Declining - diet downgraded to clear liquids, now 10 days of NPO/liquids only.       MALNUTRITION  % Weight Loss:  None noted  % Intake:  </= 50% for >/= 5 days (severe malnutrition)  Subcutaneous Fat Loss:  None observed  Muscle Loss:  None observed  Fluid Retention:  Moderate 2-3+ edema in extremities per chart (not reflective of malnutrition)    Malnutrition Diagnosis: Patient does not meet two of the above criteria necessary for diagnosing malnutrition  In Context of:  Acute illness or injury    CURRENT NUTRITION DIAGNOSIS  Inadequate oral intake related to Inadequate oral intake related to acute illness as evidenced by NPO 4 days, diet limited to liquids 6 days due to ischemic colitis diagnosis.     INTERVENTIONS  Recommendations / Nutrition Prescription  -Boost Breeze 3 times per day with meals to enhance  -When diet advances, Boost Plus (chocolate or vanilla) between meals (2-3/day)  -Continue Nutrition supplements at nursing home/rehab facility until diet fully advanced and intake  >75%    Implementation  Medical Food Supplement    Goals  -Intake 75% or greater of meals  -Consume 1 or more Boost/day while on liquid diet      MONITORING AND EVALUATION:  Progress towards goals will be monitored and evaluated per protocol and Practice Guidelines and Diet Order, Food intake and Liquid meal replacement    Dany Carroll RDN, LD  Clinical Dietitian  Phone: [phone]

## 2019-05-01 NOTE — PROGRESS NOTES
S-(situation): central line d/c    B-(background): sepsis    A-(assessment): this morning during bedside rounds with NGHIA Dewitt he verbally said to give oral potassium for replacement rather than IV so we could discontinue central line, said he wanted the line discontinued today.  Line now d/c'd, line and tip of CVC cath intact, site covered with gauze and tegaderm, patient tolerated procedure well.    R-(recommendations): possible discharge tomorrow.

## 2019-05-02 NOTE — PROGRESS NOTES
Derek Aragon  Gender: male  : 1940  [address]  [facility] [address]  [phone] (home)     Medical Record: [id number]  Pharmacy: [id number] - [place], [place] - [address] [place] [address]  Primary Care Provider: Rosalino Melton    Parent's names are: Data Unavailable (mother) and Data Unavailable (father).      [facility]  May 2, 2019     Discharge Phone Call:  Discharge to [facility]

## 2019-05-02 NOTE — PROGRESS NOTES
"S-(situation): Patient discharged to [facility] via wheel-chair with [facility] transport  B-(background): originally admitted on 4/21 with ischemic colitis with septic symptoms. Hx of DMII, CVA with left sided weakness, morbid obesity, pt. Has been wheel chair bound at home prior to admission and care was provided by wife and a PCA. Pt. States he has a \"lift\" at home that is used in transfers from bed to chair. Hx of MIO, wears a Cpap set-up at night with sleep.  A-(assessment): at time of discharge pt. Is alert oriented, his morbid obesity and left sided weakness, significantly limits what he is able to do for self or assist with. When assisted up to chair pt. Is able to feed self, otherwise is fully dependent on staff to assist with turning, repositioning, all bathing and hillary-care activities. Wears attends, incontinent of both bowel and bladder.  Last bowel movement: This am had 2 large loose brown stools.  R-(recommendations): Report called to Katiana nurse at [facility]. Listed belongings gathered and sent with patient. Including pt's own cpap machine    Discharge Nursing Criteria:     Care Plan and Patient education resolved: Yes      Vaccines  Influenza status verified at discharge:  Yes      MISC  Home and hospital aquired medications returned to patient: yes, insulin and cream x2  Medication Bin checked and emptied on discharge Yes  All paperwork sent with patient/Copy of AVS given to patient or family Yes.          "

## 2019-05-02 NOTE — PLAN OF CARE
S-(situation): Physical Therapy treatment per POC    B-(background):  Patient is a 78 year old male admitted [facility] septic shock. Patient has a previous medical history of HTN, restless leg syndrome, MIO with CPAP, DM type 2 with peripheral neuropathy, depression, obesity, CVA with left hemiparesis and aphasia, PVD, AFib, right femoral fracture in 2016.     A-(assessment): Patient discharged prior to scheduled PT treatment.     R-(recommendations): Discharge inpatient plan of care. Resume skilled therapeutic intervention at TCU to progress towards prior level of functional mobility for improved quality of life.    Physical Therapy Discharge Summary    Reason for therapy discharge:    Discharged to transitional care facility.    Progress towards therapy goal(s). See goals on Care Plan in Epic electronic health record for goal details.  Goals partially met.  Barriers to achieving goals:   limited tolerance for therapy.    Therapy recommendation(s):    Continued therapy is recommended.  Rationale/Recommendations:  see above.     Thank you for your referral.    Rosa Sahu, PT, DPT, ATC    [facility]    O: [phone]  E: [email or web address]

## 2019-05-02 NOTE — PROGRESS NOTES
x2 lg loose brown stools, requiring 3 full assist with hillary care and turning repeatedly from side to side. After hillary care complete lift placed beneath and pt. Transferred from bed to wheel chair with overhead bed lift. Legs having difficulty bending to reach set pedals. Taken to cardiology with Zuleika handy and nurse Gaona for placement of monitor device before discharge. Naye RAMSAY

## 2019-05-02 NOTE — PLAN OF CARE
Temp: 98.1  F (36.7  C) Temp src: Oral BP: 139/58 Pulse: 88 Heart Rate: 88 Resp: 20 SpO2: 97 % O2 Device: None (Room air)   Pt denies pain, occasional grimicing with hillary-cares. Incontinent of urine and stool. Groin is raw and reddened.  Cleansed with soap and water, barrier cream applied.  Nystatin as ordered.  Rash on abdomen receeding from previous markings.  Hydrocortisone applied to rashes prn.  Blood sugar 190 before dinner, carb coverage insulin provided as ordered.  Blood sugar 152 at HS.  Phone update provided to patient's spouse.  Hopeful to discharge tomorrow.

## 2019-05-02 NOTE — DISCHARGE SUMMARY
[facility]  Hospitalist Discharge Summary       Date of Admission:  4/21/2019  Date of Discharge:  5/2/2019  9:50 AM  Discharging Provider: Celso Dewitt MD      Discharge Diagnoses   Principal Problem:    Ischemic colitis (H)  Active Problems:    Septic shock (H)    Type 2 diabetes mellitus with complication, with long-term current use of insulin (H)    Diarrhea    Acute kidney injury (H)    Paroxysmal atrial fibrillation (H) with aberrant conduction    Hypomagnesemia    Anemia due to blood loss, acute - admit hgb 14.8    Hypokalemia    Ileus (H)    Dermatitis - large red patch RLQ    MIO (obstructive sleep apnea)    Peripheral vascular disease (H)    History of CVA (cerebrovascular accident) wit left hemiparesis    Essential hypertension with goal blood pressure less than 140/90    Obesity, morbid, BMI 40.0-49.9 (H)    Metabolic acidosis, increased anion gap    Encephalomalacia 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  The following labs/tests are   recommended: potassium within 1 week, cardiac Ziopatch monitor for 2 weeks   and then follow up with PCP to review those results.             Unresulted Labs Ordered in the Past 30 Days of this Admission     Date and Time Order Name Status Description    4/27/2019 1048 Blood culture Preliminary     4/27/2019 0736 Blood culture Preliminary       These results will be followed up by NH provider    Discharge Disposition   Discharged to TCU  Condition at discharge: Stable    Hospital Course   78-year-old man with multiple chronic medical problems including previous ischemic stroke with residual left hemiparesis and inability to ambulate presented with abrupt onset of decreased responsiveness.  Due to concern  for septic shock after initial evaluation in the emergency room, he was admitted to the ICU.    Problem #1 ischemic colitis with septic shock.  Blood cultures were negative during his hospital stay.  Urine culture was negative.  Stool testing for enteric pathogens was negative.  Abdomen CT demonstrated radiographic findings consistent with colitis in the descending colon.  Diarrhea became grossly bloody.  Procalcitonin and lactic acid were severely elevated and renal function was decreased consistent with acute kidney injury.  He was admitted to the ICU for treatment with broad-spectrum parenteral antibiotics, IV fluids, and vasopressors.  Moderate metabolic acidosis was treated with the addition of bicarbonate to IV fluids.  Cast catheter was placed to monitor urine output.  Central venous catheter was placed.  General surgery was consulted and recommended nonoperative management for treatment of colitis.  He gradually improved and completed 10 days of empiric broad-spectrum antibiotic therapy during this hospital stay.  Diarrhea persisted but was slowing by discharge and grossly bloody stool completely resolved.  Renal function recovered to baseline.  He tolerated advancing diet initially slowed by transient ileus from presumed ischemic, but by discharge, he was able to tolerate advancing to diabetic diet.  After investigation, acute ischemic colitis due to arterial thromboembolism associate with atrial fibrillation was strongly suspected.  Clinical course and test results were consistent with septic shock due to ischemic colitis.    Problem #2 paroxysmal nonvalvular atrial fibrillation.  During this hospital stay, patient was noted to have two episodes of paroxysmal atrial fibrillation with aberrant conduction lasting less than 12 hours.  He was asymptomatic and did not require specific intervention for treatment of atrial fibrillation.  Transthoracic echocardiogram demonstrated normal EF, no valve disease,  normal atrial size, and no signs of intracardiac thrombus.  Hypopotassemia and hypomagnesemia were treated.  Abdominal imaging with contrast did not demonstrate any overt thrombus no significant occlusive disease involving the inferior mesenteric artery.  There was strong clinical suspicion for cardioembolic event from atrial fibrillation precipitating acute ischemic colitis.  However, he had only transient episodes of atrial fibrillation during his hospital stay and had grossly bloody stool, so therapeutic anticoagulation because of atrial fibrillation was not started.  Previous chronic aspirin therapy was discontinued because of gross intestinal bleeding.  ZIO Patch cardiac monitoring was recommended and arranged at discharge to evaluate the frequency of paroxysmal atrial fibrillation.  Depending upon those results, follow-up with his provider to consider long-term prophylactic anticoagulation for atrial fibrillation was recommended particularly in the context of his history of large ischemic MCA stroke and now this episode of suspected acute ischemic colitis.    Problem #3 acute blood loss anemia due to lower GI bleeding from ischemic colitis.  Diarrhea was initially Hemoccult-positive and subsequently became grossly bloody with maroon-colored stool.  Hemoglobin dropped several grams during his hospitalization consistent with an acute blood loss anemia.  Anemia subsequently stabilized and he did not require blood transfusion.    Problem #4 acute functional decline superimposed upon chronic neurologic deficits after previous stroke.  As he recovered from this acute illness, he was weaker than his previous baseline.  He was evaluated and treated by PT and OT who advised discharge to TCU for anticipated short-term rehabilitation with which he and his family were agreeable.    Consultations This Hospital Stay   PHARMACY TO [facility]  SURGERY GENERAL IP CONSULT  SOCIAL WORK IP CONSULT  FARRUKH BOWERS SPEECH PATH AT  BEDSIDE IP CONSULT  PHYSICAL THERAPY ADULT IP CONSULT  OCCUPATIONAL THERAPY ADULT IP CONSULT  NUTRITION SERVICES ADULT IP CONSULT  PHYSICAL THERAPY ADULT IP CONSULT    Code Status   DNR/DNI    Time Spent on this Encounter   I, Celso Dewitt, personally saw the patient today and spent greater than 30 minutes discharging this patient.       Celso Dewitt MD  [facility]  ______________________________________________________________________    Physical Exam   Vital Signs: Temp: 98.3  F (36.8  C) Temp src: Oral BP: 146/63 Pulse: 94 Heart Rate: 92 Resp: (!) 32 SpO2: 97 % O2 Device: None (Room air)    Weight: 305 lbs 5.39 oz  General Appearance: Morbidly obese, no acute distress resting in bed  Respiratory: Presently normal respiratory effort without tachypnea, decreased breath sounds associate with obesity, clear lungs  Cardiovascular: Regular rate and rhythm, good radial pulse, brisk capillary refill  GI: Morbidly obese abdomen, soft, mild tenderness in the lower abdomen without overt peritoneal signs, normal bowel sounds  Skin: Patchy ill-defined areas of pink and erythematous skin on the left shoulder, abdomen, and right lower extremity none of which are indurated, warm, or tender  Other: Alert and able to follow simple instructions, attempts to respond with speech although is obviously aphasic       Primary Care Physician   Rosalino Melton    Discharge Orders      General info for SNF    Length of Stay Estimate: Short Term Care: Estimated # of Days <30  Condition at Discharge: Stable  Level of care:skilled   Rehabilitation Potential: Fair  Admission H&P remains valid and up-to-date: Yes  Recent Chemotherapy: N/A  Use Nursing Home Standing Orders: Yes     Reason for your hospital stay    Hospitalized due to severe infection (colitis) and improved     Glucose monitor nursing POCT    Before meals 3 times a day     Follow Up and recommended labs and tests    Follow up with Nursing home  physician.  The following labs/tests are recommended: potassium within 1 week, cardiac Ziopatch monitor for 2 weeks and then follow up with PCP to review those results.     Activity - Up with nursing assistance     DNR/DNI     Physical Therapy Adult Consult    Evaluate and treat as clinically indicated.    Reason:  Septic shock, weakness, old stroke     CPAP for stable sleep apnea with home equipment settings     Zio Patch Holter Adult Pediatric Greater than 48 hrs     Advance Diet as Tolerated    Follow this diet upon discharge: Orders Placed This Encounter      Snacks/Supplements Adult: Boost Plus; Between Meals      Moderate Consistent CHO Diet       Significant Results and Procedures   Most Recent 3 CBC's:  Recent Labs   Lab Test 05/01/19  0558 04/30/19  0530 04/29/19  0530  04/27/19  0600  04/23/19  0510   WBC 9.6 9.8 9.0   < >  --    < > 12.5*   HGB 9.9* 10.1* 10.9*   < > 9.7*   < > 9.9*   MCV 94 93  --   --   --   --  94    321  --   --  199   < > 172    < > = values in this interval not displayed.     Most Recent 3 BMP's:  Recent Labs   Lab Test 05/02/19  0646 05/01/19  0558 04/30/19  1200 04/30/19  0530  04/29/19  0530   NA  --  146*  --  146*  --  144   POTASSIUM 4.4 3.5  3.5 3.8 3.3*   < > 3.1*   CHLORIDE  --  118*  --  116*  --  111*   CO2  --  24  --  23  --  26   BUN  --  7  --  9  --  10   CR  --  0.95  --  0.96  --  0.97   ANIONGAP  --  4  --  7  --  7   MAKENNA  --  7.5*  --  7.5*  --  7.7*   GLC  --  131*  --  150*  --  86    < > = values in this interval not displayed.     Most Recent 2 LFT's:  Recent Labs   Lab Test 04/24/19  0435 04/23/19  0510   AST 8 13   ALT 10 10   ALKPHOS 63 57   BILITOTAL 0.5 0.5     Most Recent 6 Bacteria Isolates From Any Culture (See EPIC Reports for Culture Details):  Recent Labs   Lab Test 04/27/19  1107 04/27/19  1048 04/21/19 2030 04/21/19  1730 04/21/19  1537 06/02/15  1410   CULT No growth after 5 days No growth after 5 days No MRSA isolated No growth No  growth  No growth 10,000 to 50,000 colonies/mL Mixed gram positive marcia   ,   Results for orders placed or performed during the hospital encounter of 04/21/19   CT Head w/o Contrast    Narrative    HEAD CT AND CT ANGIOGRAM OF THE HEAD AND NECK WITHOUT AND WITH  CONTRAST  4/21/2019 2:37 PM     COMPARISON: Head CT 7/31/2015 and head MRI 1/23/2013    HISTORY: Facial droop. Decreased mental status. Code Stroke.    TECHNIQUE:    HEAD CT: Axial CT images of the head from the skull base to the vertex  were acquired without IV contrast.  HEAD AND NECK CTA: Precontrast localizing scans were followed by CT  angiography with an injection of 70mL, Isovue 370 nonionic intravenous  contrast material with scans through the head and neck.  Images were  transferred to a separate 3-D workstation where multiplanar  reformations and 3-D images were created.  Estimates of carotid  stenoses are made relative to the distal internal carotid artery  diameters except as noted.      FINDINGS:   HEAD CT:  INTRACRANIAL CONTENTS: Extensive encephalomalacia again noted in the  right MCA territory with involvement of the posterolateral right  lentiform nucleus. Encephalomalacia also noted in the right thalamus.  Chronic lacunar infarct in the genu of the left internal capsule.  Proportional prominence of the ventricular system. These findings are  stable dating back to 2013.    VISUALIZED ORBITS/SINUSES/MASTOIDS: No significant orbital  abnormality. No significant paranasal sinus mucosal disease. No  significant middle ear or mastoid effusion.    OSSEOUS STRUCTURES/SOFT TISSUES: No significant abnormality.    HEAD CTA:  ANTERIOR CIRCULATION: Decreased arborization throughout the right MCA  territory. This correlates with the areas of encephalomalacia. The  anterior circulation is otherwise normal. Essentially fetal right  posterior cerebral artery.    POSTERIOR CIRCULATION: No significant stenosis or occlusion. Balanced  vertebral arteries  supply a normal basilar artery.    ANEURYSM/VASCULAR MALFORMATION: None.    NECK CTA:  RIGHT CAROTID: No measurable stenosis in the right ICA based on NASCET  criteria.    LEFT CAROTID: No measurable stenosis in the left ICA based on NASCET  criteria.     VERTEBRAL ARTERIES: Dominant left and smaller right vertebral  arteries, patent in the neck and into the head.     AORTIC ARCH: Classic aortic arch anatomy with no significant stenosis  at the origin of the great vessels.      Impression    IMPRESSION:  HEAD CT:  1.  No mass, hemorrhage or acute stroke.  2.  Large area of right cerebral encephalomalacia largely in the right  MCA territory but also extending into the right thalamus. This pattern  correlates with the patient's essentially fetal right PCA origin.  3.  Chronic infarct in the genu of the left internal capsule.  4.  No change in the appearance of the brain dating back to 2013.    HEAD CTA:  1.  Decreased arborization throughout the right MCA territory  correlating with the areas of encephalomalacia.  2.  The remainder of the head CTA is normal.  3.  Essentially fetal origin of the right posterior cerebral artery.    NECK CTA:  1.  Normal neck CTA  for age.  2.  No significant stenosis as per NASCET criteria.    Nonenhanced head CT findings were discussed with Dr. Alonzo at 2:41 PM  hours and head and neck CTA findings were discussed with Dr. Alonzo at  3:00 PM hours on 4/21/2019.    Radiation dose for this scan was reduced using automated exposure  control, adjustment of the mA and/or kV according to patient size, or  iterative reconstruction technique.      GISSELLE LAURA MD   CT Head Neck Angio w/o & w Contrast    Narrative    HEAD CT AND CT ANGIOGRAM OF THE HEAD AND NECK WITHOUT AND WITH  CONTRAST  4/21/2019 2:37 PM     COMPARISON: Head CT 7/31/2015 and head MRI 1/23/2013    HISTORY: Facial droop. Decreased mental status. Code Stroke.    TECHNIQUE:    HEAD CT: Axial CT images of the head from the skull  base to the vertex  were acquired without IV contrast.  HEAD AND NECK CTA: Precontrast localizing scans were followed by CT  angiography with an injection of 70mL, Isovue 370 nonionic intravenous  contrast material with scans through the head and neck.  Images were  transferred to a separate 3-D workstation where multiplanar  reformations and 3-D images were created.  Estimates of carotid  stenoses are made relative to the distal internal carotid artery  diameters except as noted.      FINDINGS:   HEAD CT:  INTRACRANIAL CONTENTS: Extensive encephalomalacia again noted in the  right MCA territory with involvement of the posterolateral right  lentiform nucleus. Encephalomalacia also noted in the right thalamus.  Chronic lacunar infarct in the genu of the left internal capsule.  Proportional prominence of the ventricular system. These findings are  stable dating back to 2013.    VISUALIZED ORBITS/SINUSES/MASTOIDS: No significant orbital  abnormality. No significant paranasal sinus mucosal disease. No  significant middle ear or mastoid effusion.    OSSEOUS STRUCTURES/SOFT TISSUES: No significant abnormality.    HEAD CTA:  ANTERIOR CIRCULATION: Decreased arborization throughout the right MCA  territory. This correlates with the areas of encephalomalacia. The  anterior circulation is otherwise normal. Essentially fetal right  posterior cerebral artery.    POSTERIOR CIRCULATION: No significant stenosis or occlusion. Balanced  vertebral arteries supply a normal basilar artery.    ANEURYSM/VASCULAR MALFORMATION: None.    NECK CTA:  RIGHT CAROTID: No measurable stenosis in the right ICA based on NASCET  criteria.    LEFT CAROTID: No measurable stenosis in the left ICA based on NASCET  criteria.     VERTEBRAL ARTERIES: Dominant left and smaller right vertebral  arteries, patent in the neck and into the head.     AORTIC ARCH: Classic aortic arch anatomy with no significant stenosis  at the origin of the great vessels.       Impression    IMPRESSION:  HEAD CT:  1.  No mass, hemorrhage or acute stroke.  2.  Large area of right cerebral encephalomalacia largely in the right  MCA territory but also extending into the right thalamus. This pattern  correlates with the patient's essentially fetal right PCA origin.  3.  Chronic infarct in the genu of the left internal capsule.  4.  No change in the appearance of the brain dating back to 2013.    HEAD CTA:  1.  Decreased arborization throughout the right MCA territory  correlating with the areas of encephalomalacia.  2.  The remainder of the head CTA is normal.  3.  Essentially fetal origin of the right posterior cerebral artery.    NECK CTA:  1.  Normal neck CTA  for age.  2.  No significant stenosis as per NASCET criteria.    Nonenhanced head CT findings were discussed with Dr. Alonzo at 2:41 PM  hours and head and neck CTA findings were discussed with Dr. Alonzo at  3:00 PM hours on 4/21/2019.    Radiation dose for this scan was reduced using automated exposure  control, adjustment of the mA and/or kV according to patient size, or  iterative reconstruction technique.      GISSELLE LAURA MD   POC US ECHO LIMITED    Impression    Error in type of ultrasound, this was for needle placement.    Ultrasound used for placement of central line for needle guidance.  Real-time central line placement was performed using ultrasound guidance with successful placement of right-sided internal jugular vein catheter.  Wire visualized in the middle of the internal jugular vein.       XR Chest Port 1 View    Narrative    CHEST PORTABLE ONE VIEW   4/21/2019 3:07 PM     COMPARISON: Two view chest x-ray 1/22/2013.    HISTORY: Hypotension, weakness.    FINDINGS: The cardiac silhouette, pulmonary vasculature, lungs and  pleural spaces are within normal limits.      Impression    IMPRESSION: Clear lungs.    LINK YOUSSEF MD   Chest  XR, 1 view portable    Narrative    CHEST PORTABLE ONE VIEW   4/21/2019 5:02 PM      HISTORY: Post central line placement.      Impression    IMPRESSION: Right internal jugular central line is in place with the  tip projecting in the mid superior vena cava. There may be linear  atelectasis overlapping the right and left hemidiaphragms. The lungs  are otherwise grossly clear.     EDISON DUTTA MD   CT Abdomen pelvis w/o contrast    Narrative    CT ABDOMEN AND PELVIS WITHOUT CONTRAST  4/22/2019 1:50 PM    HISTORY:  With oral contrast only, septic shock of unclear etiology  with acute kidney injury, now with worsening abdominal pain, nausea,  vomiting, diarrhea. Patient was difficult to scan due to his body  habitus.    TECHNIQUE: Scans obtained from the diaphragm through the pelvis  without oral or IV contrast.   Radiation dose for this scan was reduced using automated exposure  control, adjustment of the mA and/or kV according to patient size, or  iterative reconstruction technique.    COMPARISON:  None.    FINDINGS:  Mild scarring versus atelectasis is seen in the visualized  portions of the bilateral lung bases. There are coronary artery  calcifications and aortic calcifications. The heart appears mildly  enlarged. Visualized portions of the lung bases and mediastinal  contents are otherwise grossly unremarkable.    Patient is status post left hip pin and klever fixation. This hardware  causes mild streak artifact minimally limiting evaluation. No acute  fracture is identified. Degenerative changes are noted in the spine.  No aggressive osseous lesions are seen. There appears to be bilateral  spondylolysis at L5 with anterolisthesis at L5 on S1. This appears  chronic.     The gallbladder appears to be surgically absent. Contrast excretion is  seen in the kidneys indicating delay of imaging after contrast  administration. There is a hyperdense structure which appears to be  exophytically arising from the posterior cortex of the left mid kidney  (image 46 series 2, image 37 series 3, and image 35  series 4)  measuring approximately 2.3 cm in maximum dimension. This  exophytically extends from the kidney and is indeterminate as it  enhances similarly to the kidney. No other hypodense lesion is seen  along the peripheral aspect of the left kidney (image 27 series 4,  image 39 series 2 and image 30 series 3). This is also slightly  hyperdense to that expected for simple cyst. Small parapelvic left  intrarenal cyst (image 28 series 4) is noted.    The liver, pancreas, spleen, bilateral adrenal glands, bilateral  kidneys otherwise enhance normally. No hydronephrosis,  nephrolithiasis, hydroureter or ureteral calculus is identified. A  small calculus could be obscured by the contrast in the collecting  systems.    There is stranding and edema adjacent to the left mid ureter. There is  nonaneurysmal atherosclerosis.    No adenopathy, free fluid or free air seen in the peritoneal cavity.  Urinary bladder is decompressed around a Cast catheter. There are  dense dystrophic calcifications in the prostate gland.    No adenopathy, free air, or free fluid is seen in the peritoneal  cavity.    There is thickening of the wall of the rectum, sigmoid colon and  distal descending colon. The colon is otherwise grossly of normal  caliber. It is filled with air in the transverse portion. Appendix is  not well seen, although structure which could represent a normal  appendix is seen at the cecum. No pericecal inflammatory change to  suggest acute appendicitis. Small bowel is grossly of normal caliber.  Stomach is filled with ingested material but is otherwise  unremarkable.    Fatty atrophy is seen in the erector spinae muscles.      Impression    IMPRESSION:  1. Mild thickening of the distal colon with adjacent pericolonic  inflammatory stranding is concerning for colitis of ischemic,  infectious, or inflammatory etiology. No free intraperitoneal air or  portal venous gas is seen.  2. Extensive atherosclerosis. Dense  calcifications are seen at the  origin of the superior mesenteric artery.  3. Mild stranding is seen adjacent to the left mid ureter. This could  be secondary to urinary tract infections causing adjacent  inflammation. Recommend clinical correlation.  4. Hyperdense cyst or solid lesions exophytically extending from the  left kidney. This could be better evaluated with ultrasound or with CT  or MRI.    I discussed the findings of the probable distal colitis of ischemic,  inflammatory or infectious etiology with Dr. Palomares on 4/22/2019 at  2:45 PM, when she became available.    ZORA HARRISON MD   X-ray Abdomen flat port    Narrative    ABDOMEN ONE VIEW PORTABLE  4/28/2019 7:57 AM     HISTORY: Recurrent fever and increasing distention and abdominal pain  in patient with previous ischemic colitis versus infectious colitis.    COMPARISON: 4/13/2013 radiographs. CT scan 4/22/2019      Impression    IMPRESSION: Moderate gaseous distention of the colon, new. Ascending  colon is up to 13.7 cm. No small bowel dilatation. No stool is seen in  the colon.    JUVENTINO MARTÍNEZ MD   CT Abdomen pelvis w contrast*    Narrative    CT ABDOMEN AND PELVIS WITH CONTRAST 4/28/2019 12:08 PM    TECHNIQUE: Images from diaphragm to pubic symphysis, 100 mL Isovue 370  IV contrast. Radiation dose for this scan was reduced using automated  exposure control, adjustment of the mA and/or kV according to patient  size, or iterative reconstruction technique.    HISTORY: Recurrent fever, worsening abdominal pain and distention with  x-ray showing increased colon gas pattern in patient with recent  septic shock with suspected infectious versus ischemic colitis  etiology.    COMPARISON: 4/28/2019 x-ray and 4/22/2019 CT abdomen and pelvis    FINDINGS: Moderate bilateral pleural effusions have developed with  mild associated atelectasis. There is diffuse distention of the colon  with gas and air-fluid levels. The bowel wall thickening involving  distal  colon on 4/22/2019 appears improved, the colon is more  distended currently. There is minimal colon wall thickening identified  in the sigmoid colon. Small fluid in the left paracolic gutter. No  evidence for obstructing lesion. There is no pneumatosis. Right colon  is incompletely included on this exam but distended where visualized.  No evidence for small bowel distention. No evidence for portal venous  air or free air.    Cholecystectomy clips. Fullness and curvilinear densities which could  represent postop changes near the pancreatic tail which partially  obscure the pancreatic tail. This appears lobulated and slightly  prominent but could just represent postsurgical change. Clinical  correlation with surgical history. The pancreatic head, body and  proximal tail unremarkable. Liver, spleen, adrenal glands and right  kidney appear normal. 2 cm hyperdense lesion along the posterior left  kidney is indeterminate and could represent a complicated left renal  cyst versus solid left exophytic renal mass.    No periaortic or pelvic adenopathy. Postop changes proximal left  femur.      Impression    IMPRESSION:   1. Moderate bilateral pleural effusions.  2. Diffuse colonic distention significantly increased since 4/22/2019.  Previously seen distal left colon bowel wall thickening consistent  with colitis is improved and nearly resolved with residual minimal  distal colon wall thickening.  3. No obstructing lesion or evidence for pneumatosis. This could  represent a diffuse colonic ileus.  4. Pancreatic tail is not optimally visualized, there is curvilinear  high attenuation and some lobulation and fullness in the region of the  superior pancreatic tail which is incompletely assessed on this exam.    FARNAZ CARRASCO MD       Discharge Medications   Discharge Medication List as of 5/2/2019  9:30 AM      START taking these medications    Details   hydrocortisone 2.5 % cream Apply topically 2 times daily as needed for  rash or itchingTransitional      !! insulin aspart (NOVOLOG PEN) 100 UNIT/ML pen Inject 1-6 Units Subcutaneous 3 times daily (with meals) For  - 189 give 1 unit.  For  - 239 give 2 units.  For  - 289 give 3 units.  For  - 339 give 4 units.  For  - 399 give 5 units.  For  or higher give 6 units., Tr ansitional      !! insulin aspart (NOVOLOG PEN) 100 UNIT/ML pen Inject 5 Units Subcutaneous 3 times daily (with meals), Transitional      nystatin (MYCOSTATIN) 062086 UNIT/GM external cream Apply topically 2 times dailyTransitional      potassium chloride ER (K-DUR/KLOR-CON M) 20 MEQ CR tablet Take 2 tablets (40 mEq) by mouth 2 times daily, Transitional       !! - Potential duplicate medications found. Please discuss with provider.      CONTINUE these medications which have CHANGED    Details   LANTUS SOLOSTAR 100 UNIT/ML soln Inject 15 Units Subcutaneous every morning, Disp-45 mL, R-5, LENNY, Transitional      rOPINIRole (REQUIP) 0.25 MG tablet Take 1 tablet (0.25 mg) by mouth nightly as needed (restless legs), Disp-90 tablet, R-1, Transitional         CONTINUE these medications which have NOT CHANGED    Details   albuterol (2.5 MG/3ML) 0.083% nebulizer solution NEBULIZE 1 VIAL BY MOUTH EV NANCY 6 HOURS AS NEEDED FOR W HEEZING / SHORTNESS OF DREW TH /DYSPNEA, Disp-90 mL, R-3, E-Prescribe      blood glucose monitoring (MICHELLE MICROLET) lancets Use to test blood sugar 4 times daily or as directed., Disp-3 Box, R-3, E-Prescribe      blood glucose monitoring (NO BRAND SPECIFIED) test strip Use to test blood sugar daily or as directed., Disp-100 strip, R-1, E-Prescribe      DULoxetine (CYMBALTA) 30 MG EC capsule TAKE 1 CAPSULE BY MOUTH ONCE  A DAY, Disp-180 capsule, R-1, E-PrescribeProfile Rx: patient will contact pharmacy when needed      furosemide (LASIX) 20 MG tablet Take 1 tablet (20 mg) by mouth daily, Disp-90 tablet, R-3, E-PrescribeProfile Rx: patient will contact pharmacy when needed       NOVOFINE 30 30G X 8 MM insulin pen needle USE WITH INSULIN PEN TWICE A DAYDisp-100 each, [id number]      !! ORDER FOR DME, SET TO LOCAL PRINT, Equipment being ordered: CPAP at previous home settingsTransitional      !! order for DME Equipment ordered: RESMED Auto PAP Mask type: Full face  Settings: 5-10 CM [id number]      simvastatin (ZOCOR) 40 MG tablet TAKE 1 TABLET (40 MG) BY MOUTH AT BEDTIME, Disp-90 tablet, R-2, E-PrescribePatient enrolled in our Rx Med Sync service to improveadherence. We are requesting a refill authorization inadvance to ensure an active prescription is on file.       !! - Potential duplicate medications found. Please discuss with provider.      STOP taking these medications       acyclovir (ZOVIRAX) 5 % cream Comments:   Reason for Stopping:         aspirin (ASA) 325 MG EC tablet Comments:   Reason for Stopping:         docusate sodium (COLACE) 100 MG tablet Comments:   Reason for Stopping:         hydrochlorothiazide (HYDRODIURIL) 25 MG tablet Comments:   Reason for Stopping:         lisinopril (PRINIVIL/ZESTRIL) 20 MG tablet Comments:   Reason for Stopping:         loratadine (ALLERGY RELIEF) 10 MG tablet Comments:   Reason for Stopping:         metFORMIN (GLUCOPHAGE) 1000 MG tablet Comments:   Reason for Stopping:         Miconazole Nitrate POWD powder Comments:   Reason for Stopping:         Nystatin (NYSTOP) 045120 UNIT/GM POWD Comments:   Reason for Stopping:         STOOL SOFTENER/LAXATIVE 50-8.6 MG tablet Comments:   Reason for Stopping:             Allergies   Allergies   Allergen Reactions     No Known Drug Allergies

## 2019-05-02 NOTE — PROGRESS NOTES
Name: Derek Aragon    MRN#: [id number]    Reason for Hospitalization: Sepsis, due to unspecified organism (H) [A41.9]    Discharge Date: 5/2/2019    Patient / Family response to discharge plan: Writer spoke with patient in his room and with wife, Mattie, over the phone.  Both in agreement with discharge to the TCU at [facility] for today. Handivan available to transport patient at 1100 today.  Patient used own wheelchair that wife had brought in previously.      Other Providers (Care Coordinator, County Services, PCA services etc): Yes: [facility] [place] - Jossy Hawkinsvanekarina #[phone].    CTS Hand Off Completed: Yes     PAS #: [id number]     ERNESTO Score: Average     Future Appointments:   Future Appointments   Date Time Provider Department Center   5/30/2019  2:50 PM Rosalino Melton MD skilled nursing [facility]       Discharge Disposition: transitional care unit    Discharge Planner   Discharge Plans in progress: Completed. Patient discharge to TCU at [facility] today. Handivan transport 1100.  Barriers to discharge plan: None   Follow up plan: Per PCP, TCU       Entered by: KULDEEP BEAULIEU 05/02/2019 11:41 AM           RAYSA Patel

## 2019-05-03 NOTE — LETTER
5/3/2019        RE: Derek Aragon  [address]  [facility] [address]        [facility]  PRIMARY CARE PROVIDER AND CLINIC:  Rosalino Melton MD, [address]  / [facility] [address]  Chief Complaint   Patient presents with     Hospital F/U     [facility] Medical Record Number:  [id number]  Place of Service where encounter took place:  [facility] ([facility] [[id number]]    Derek Aragon  is a 78 year old  (1940), admitted to the above facility from  [facility]. Hospital stay 4/1/19 through 5/2/19..  Admitted to this facility for  rehab, medical management and nursing care.    HPI:    HPI information obtained from: facility chart records, facility staff and [facility] chart review.   Brief Summary of Hospital Course:  Derek presented to the [facility] ED with decreased responsiveness and signs of septic shock.  Found to have ischemic colitis with septic shock.  Blood culture, urine culture and stool testing negative while in hospital.  CT of abdomen done and found to have colitis in descending colon.  Diarrhea became grossly bloody.  Was in ICU for treatment with ABX, IV fluids and vasopressors.  Did complete a 10 day empiric broad spectrum ABX therapy during his stay.  By time of discharge, diarrhea still present but bloody stools resolved.  It was suspected that the clolitis was caused by arterial thromboembolism associated with atrial fibrillation.  Updates on Status Since Skilled nursing Admission:  Today came to see Derek as he was initially sleeping in bed with his CPAP present.  Improving rash from ABX on lower legs.  Hx of CVA and so has left sided weakness.  4 point lift used for transfers for now.  Staff propel w/c.  Able to feed self with right hand.  Staff note he continues with loose stools.    CODE STATUS/ADVANCE DIRECTIVES DISCUSSION:   DNR only  Patient's living condition: lives with spouse  ALLERGIES: No known drug allergies  PAST MEDICAL  HISTORY:  has a past medical history of Anxiety state, unspecified, Diabetic eye exam (H) (10/10/12), Impotence of organic origin, Obesity, morbid, BMI 40.0-49.9 (H) (10/27/2015), Obesity, unspecified, Pure hypercholesterolemia, Sleep disturbance, unspecified, Type II or unspecified type diabetes mellitus without mention of complication, not stated as uncontrolled, Unspecified cerebral artery occlusion with cerebral infarction, and Unspecified essential hypertension.  PAST SURGICAL HISTORY:   has a past surgical history that includes REMOVAL GALLBLADDER; ENT surgery; Colonoscopy (12/21/2012); and OPEN RX FEMUR FX+INTRAMED EDE (Left).  FAMILY HISTORY: family history includes Cardiovascular in his mother; Cerebrovascular Disease in his father; Diabetes in his mother; Lipids in his sister; Obesity in his maternal grandmother and mother; Psychotic Disorder in his maternal grandfather and maternal grandmother.  SOCIAL HISTORY:   reports that he quit smoking about 34 years ago. His smoking use included cigarettes. He has a 20.00 pack-year smoking history. He has never used smokeless tobacco. He reports that he does not drink alcohol or use drugs.    Post Discharge Medication Reconciliation Status: discharge medications reconciled, continue medications without change    Current Outpatient Medications   Medication Sig Dispense Refill     albuterol (2.5 MG/3ML) 0.083% nebulizer solution NEBULIZE 1 VIAL BY MOUTH EV NANCY 6 HOURS AS NEEDED FOR W HEEZING / SHORTNESS OF DREW TH /DYSPNEA 90 mL 3     DULoxetine (CYMBALTA) 30 MG EC capsule TAKE 1 CAPSULE BY MOUTH ONCE  A  capsule 1     furosemide (LASIX) 20 MG tablet Take 1 tablet (20 mg) by mouth daily 90 tablet 3     hydrocortisone 2.5 % cream Apply topically 2 times daily       insulin aspart (NOVOLOG PEN) 100 UNIT/ML pen Inject 1-6 Units Subcutaneous 3 times daily (with meals) For  - 189 give 1 unit.  For  - 239 give 2 units.  For  - 289 give 3  "units.  For  - 339 give 4 units.  For  - 399 give 5 units.  For  or higher give 6 units.       insulin aspart (NOVOLOG PEN) 100 UNIT/ML pen Inject 5 Units Subcutaneous 3 times daily (with meals)       LANTUS SOLOSTAR 100 UNIT/ML soln Inject 15 Units Subcutaneous every morning 45 mL 5     nystatin (MYCOSTATIN) 097059 UNIT/GM external cream Apply topically 2 times daily       potassium chloride ER (K-DUR/KLOR-CON M) 20 MEQ CR tablet Take 2 tablets (40 mEq) by mouth 2 times daily       rOPINIRole (REQUIP) 0.25 MG tablet Take 1 tablet (0.25 mg) by mouth nightly as needed (restless legs) 90 tablet 1     simvastatin (ZOCOR) 40 MG tablet TAKE 1 TABLET (40 MG) BY MOUTH AT BEDTIME 90 tablet 2     blood glucose monitoring (BRD MotorcyclesET) lancets Use to test blood sugar 4 times daily or as directed. 3 Box 3     blood glucose monitoring (NO BRAND SPECIFIED) test strip Use to test blood sugar daily or as directed. 100 strip 1     hydrocortisone 2.5 % cream Apply topically 2 times daily as needed for rash or itching       NOVOFINE 30 30G X 8 MM insulin pen needle USE WITH INSULIN PEN TWICE A  each 3     ORDER FOR DME, SET TO LOCAL PRINT, Equipment being ordered: CPAP at previous home settings (Patient not taking: Reported on 6/1/2018)       order for DME Equipment ordered: RESMED Auto PAP Mask type: Full face  Settings: 5-10 CM H2O         ROS:  Unable as he is sleeping.  Staff note he sleeps in to about 9am or so.    Vitals:  /59   Pulse 93   Temp 97.8  F (36.6  C)   Resp 28   Ht 1.753 m (5' 9\")   Wt 134.3 kg (296 lb)   SpO2 93%   BMI 43.71 kg/m     Exam:  Obese gentleman laying flat in bed.  CPAP is present.  Did not arouse to touch or name.  Left upper extremity shows hand curled in - hemiparesis  Faded rash on legs otherwise skin is warm, pale, and dry.  3-4 loose stools a shift noted and barrier cream used on bottom to prevent moisture breakdown.  Radial pulse is regular.  Abdomen is " large, round and soft to touch.    Staff use the 4 point lift on him for transfers and propel his w/c.      Lab/Diagnostic data:  Component      Latest Ref Rng & Units 5/1/2019   Sodium      133 - 144 mmol/L 146 (H)   Potassium      3.4 - 5.3 mmol/L 3.5   Chloride      94 - 109 mmol/L 118 (H)   Carbon Dioxide      20 - 32 mmol/L 24   Anion Gap      3 - 14 mmol/L 4   Glucose      70 - 99 mg/dL 131 (H)   Urea Nitrogen      7 - 30 mg/dL 7   Creatinine      0.66 - 1.25 mg/dL 0.95   GFR Estimate      >60 mL/min/1.73:m2 77   GFR Estimate If Black      >60 mL/min/1.73:m2 89   Calcium      8.5 - 10.1 mg/dL 7.5 (L)   WBC      4.0 - 11.0 10e9/L 9.6   RBC Count      4.4 - 5.9 10e12/L 3.38 (L)   Hemoglobin      13.3 - 17.7 g/dL 9.9 (L)   Hematocrit      40.0 - 53.0 % 31.8 (L)   MCV      78 - 100 fl 94   MCH      26.5 - 33.0 pg 29.3   MCHC      31.5 - 36.5 g/dL 31.1 (L)   RDW      10.0 - 15.0 % 14.7   Platelet Count      150 - 450 10e9/L 310       ASSESSMENT/PLAN:  Current [place] scheduled appointments:  Next 5 appointments (look out 90 days)    May 30, 2019  2:50 PM CDT  [facility] [address] [place] [id number] [phone]         Ischemic colitis (H)  No evidence of bloody stools while here so far.  Just diarrhea 3-4x shift.    Diet as tolerated with moderate consistent carbs due to DM.  Will order a CBC for Monday as he was to have a K+ lab on Monday.  Will change that to a BMP  Will be attending therapies for strengthening with goal of returning home where his spouse is his primary care giver    Encephalomalacia with old cerebral infarction (H) right MCA territory  Left hemiparesis (H)  Aphasia as late effect of stroke  - able to manipulate his environment with the left upper extremity.  Wheelchair bound is a diagnosis in his Epic Chart.      Type 2 diabetes mellitus with diabetic neuropathy, with long-term current use of insulin  (H)  Currently on Lantus 15 units in AM, Novolog 5 units with meals and then sliding scale as well with meals.  No changes.  Sugars are done before meals.  Range so far has been 112 in AM to 220 before supper.  Will watch further out from admission his sugars.    Obesity, morbid, BMI 40.0-49.9 (H)  Dependent on staff for mobility and transfers.    Moderate consistent CHO diet.  Weight to be obtained yet.    Essential hypertension with goal blood pressure less than 140/90  Blood pressures so far have been 120-150's/40-70's.    Is on lasix 20mg daily with K+ 40meq twice a day.    Vitals daily while here and so will monitor.    Adjustment disorder with depressed mood  Continues with Cymbalta 30mg daily.  No concerns from nursing so far.  Continue to monitor mood.       Orders written by provider at facility  1.  Change K+ to a BMP on Monday  2.  Add CBC to Monday's lab due to ischemic colitis.    Total time spent with patient visit at the skilled nursing facility was 25 min including patient visit and review of past records. Greater than 50% of total time spent with counseling and coordinating care due to reviewing chart and discussion with nursing    Electronically signed by:  ELIAN Guardado CNP                         Sincerely,        ELIAN Guardado CNP

## 2019-05-03 NOTE — PROGRESS NOTES
[place] [facility]  PRIMARY CARE PROVIDER AND CLINIC:  Rosalino Melton MD, [address]  / [facility] [address]  Chief Complaint   Patient presents with     Hospital F/U     [place] Medical Record Number:  [id number]  Place of Service where encounter took place:  [facility] ([facility]) [[id number]]    Derek Aragon  is a 78 year old  (1940), admitted to the above facility from  [facility]. Hospital stay 4/1/19 through 5/2/19..  Admitted to this facility for  rehab, medical management and nursing care.    HPI:    HPI information obtained from: facility chart records, facility staff and [facility] chart review.   Brief Summary of Hospital Course:  Derek presented to the [facility] ED with decreased responsiveness and signs of septic shock.  Found to have ischemic colitis with septic shock.  Blood culture, urine culture and stool testing negative while in hospital.  CT of abdomen done and found to have colitis in descending colon.  Diarrhea became grossly bloody.  Was in ICU for treatment with ABX, IV fluids and vasopressors.  Did complete a 10 day empiric broad spectrum ABX therapy during his stay.  By time of discharge, diarrhea still present but bloody stools resolved.  It was suspected that the clolitis was caused by arterial thromboembolism associated with atrial fibrillation.  Updates on Status Since Skilled nursing Admission:  Today came to see Derek as he was initially sleeping in bed with his CPAP present.  Improving rash from ABX on lower legs.  Hx of CVA and so has left sided weakness.  4 point lift used for transfers for now.  Staff propel w/c.  Able to feed self with right hand.  Staff note he continues with loose stools.    CODE STATUS/ADVANCE DIRECTIVES DISCUSSION:   DNR only  Patient's living condition: lives with spouse  ALLERGIES: No known drug allergies  PAST MEDICAL HISTORY:  has a past medical history of Anxiety state, unspecified, Diabetic eye  exam (H) (10/10/12), Impotence of organic origin, Obesity, morbid, BMI 40.0-49.9 (H) (10/27/2015), Obesity, unspecified, Pure hypercholesterolemia, Sleep disturbance, unspecified, Type II or unspecified type diabetes mellitus without mention of complication, not stated as uncontrolled, Unspecified cerebral artery occlusion with cerebral infarction, and Unspecified essential hypertension.  PAST SURGICAL HISTORY:   has a past surgical history that includes REMOVAL GALLBLADDER; ENT surgery; Colonoscopy (12/21/2012); and OPEN RX FEMUR FX+INTRAMED EDE (Left).  FAMILY HISTORY: family history includes Cardiovascular in his mother; Cerebrovascular Disease in his father; Diabetes in his mother; Lipids in his sister; Obesity in his maternal grandmother and mother; Psychotic Disorder in his maternal grandfather and maternal grandmother.  SOCIAL HISTORY:   reports that he quit smoking about 34 years ago. His smoking use included cigarettes. He has a 20.00 pack-year smoking history. He has never used smokeless tobacco. He reports that he does not drink alcohol or use drugs.    Post Discharge Medication Reconciliation Status: discharge medications reconciled, continue medications without change    Current Outpatient Medications   Medication Sig Dispense Refill     albuterol (2.5 MG/3ML) 0.083% nebulizer solution NEBULIZE 1 VIAL BY MOUTH EV NANCY 6 HOURS AS NEEDED FOR W HEEZING / SHORTNESS OF DREW TH /DYSPNEA 90 mL 3     DULoxetine (CYMBALTA) 30 MG EC capsule TAKE 1 CAPSULE BY MOUTH ONCE  A  capsule 1     furosemide (LASIX) 20 MG tablet Take 1 tablet (20 mg) by mouth daily 90 tablet 3     hydrocortisone 2.5 % cream Apply topically 2 times daily       insulin aspart (NOVOLOG PEN) 100 UNIT/ML pen Inject 1-6 Units Subcutaneous 3 times daily (with meals) For  - 189 give 1 unit.  For  - 239 give 2 units.  For  - 289 give 3 units.  For  - 339 give 4 units.  For  - 399 give 5 units.  For  or  "higher give 6 units.       insulin aspart (NOVOLOG PEN) 100 UNIT/ML pen Inject 5 Units Subcutaneous 3 times daily (with meals)       LANTUS SOLOSTAR 100 UNIT/ML soln Inject 15 Units Subcutaneous every morning 45 mL 5     nystatin (MYCOSTATIN) 897468 UNIT/GM external cream Apply topically 2 times daily       potassium chloride ER (K-DUR/KLOR-CON M) 20 MEQ CR tablet Take 2 tablets (40 mEq) by mouth 2 times daily       rOPINIRole (REQUIP) 0.25 MG tablet Take 1 tablet (0.25 mg) by mouth nightly as needed (restless legs) 90 tablet 1     simvastatin (ZOCOR) 40 MG tablet TAKE 1 TABLET (40 MG) BY MOUTH AT BEDTIME 90 tablet 2     blood glucose monitoring ([employer]) lancets Use to test blood sugar 4 times daily or as directed. 3 Box 3     blood glucose monitoring (NO BRAND SPECIFIED) test strip Use to test blood sugar daily or as directed. 100 strip 1     hydrocortisone 2.5 % cream Apply topically 2 times daily as needed for rash or itching       NOVOFINE 30 30G X 8 MM insulin pen needle USE WITH INSULIN PEN TWICE A  each 3     ORDER FOR DME, SET TO LOCAL PRINT, Equipment being ordered: CPAP at previous home settings (Patient not taking: Reported on 6/1/2018)       order for DME Equipment ordered: RESMED Auto PAP Mask type: Full face  Settings: 5-10 CM H2O         ROS:  Unable as he is sleeping.  Staff note he sleeps in to about 9am or so.    Vitals:  /59   Pulse 93   Temp 97.8  F (36.6  C)   Resp 28   Ht 1.753 m (5' 9\")   Wt 134.3 kg (296 lb)   SpO2 93%   BMI 43.71 kg/m    Exam:  Obese gentleman laying flat in bed.  CPAP is present.  Did not arouse to touch or name.  Left upper extremity shows hand curled in - hemiparesis  Faded rash on legs otherwise skin is warm, pale, and dry.  3-4 loose stools a shift noted and barrier cream used on bottom to prevent moisture breakdown.  Radial pulse is regular.  Abdomen is large, round and soft to touch.    Staff use the 4 point lift on him for transfers and " propel his w/c.      Lab/Diagnostic data:  Component      Latest Ref Rng & Units 5/1/2019   Sodium      133 - 144 mmol/L 146 (H)   Potassium      3.4 - 5.3 mmol/L 3.5   Chloride      94 - 109 mmol/L 118 (H)   Carbon Dioxide      20 - 32 mmol/L 24   Anion Gap      3 - 14 mmol/L 4   Glucose      70 - 99 mg/dL 131 (H)   Urea Nitrogen      7 - 30 mg/dL 7   Creatinine      0.66 - 1.25 mg/dL 0.95   GFR Estimate      >60 mL/min/1.73:m2 77   GFR Estimate If Black      >60 mL/min/1.73:m2 89   Calcium      8.5 - 10.1 mg/dL 7.5 (L)   WBC      4.0 - 11.0 10e9/L 9.6   RBC Count      4.4 - 5.9 10e12/L 3.38 (L)   Hemoglobin      13.3 - 17.7 g/dL 9.9 (L)   Hematocrit      40.0 - 53.0 % 31.8 (L)   MCV      78 - 100 fl 94   MCH      26.5 - 33.0 pg 29.3   MCHC      31.5 - 36.5 g/dL 31.1 (L)   RDW      10.0 - 15.0 % 14.7   Platelet Count      150 - 450 10e9/L 310       ASSESSMENT/PLAN:  Current [place] scheduled appointments:  Next 5 appointments (look out 90 days)    May 30, 2019  2:50 PM CDT  [facility] [address] [place] [id number] [phone]         Ischemic colitis (H)  No evidence of bloody stools while here so far.  Just diarrhea 3-4x shift.    Diet as tolerated with moderate consistent carbs due to DM.  Will order a CBC for Monday as he was to have a K+ lab on Monday.  Will change that to a BMP  Will be attending therapies for strengthening with goal of returning home where his spouse is his primary care giver    Encephalomalacia with old cerebral infarction (H) right MCA territory  Left hemiparesis (H)  Aphasia as late effect of stroke  - able to manipulate his environment with the left upper extremity.  Wheelchair bound is a diagnosis in his Epic Chart.      Type 2 diabetes mellitus with diabetic neuropathy, with long-term current use of insulin (H)  Currently on Lantus 15 units in AM, Novolog 5 units with meals and then sliding scale  as well with meals.  No changes.  Sugars are done before meals.  Range so far has been 112 in AM to 220 before supper.  Will watch further out from admission his sugars.    Obesity, morbid, BMI 40.0-49.9 (H)  Dependent on staff for mobility and transfers.    Moderate consistent CHO diet.  Weight to be obtained yet.    Essential hypertension with goal blood pressure less than 140/90  Blood pressures so far have been 120-150's/40-70's.    Is on lasix 20mg daily with K+ 40meq twice a day.    Vitals daily while here and so will monitor.    Adjustment disorder with depressed mood  Continues with Cymbalta 30mg daily.  No concerns from nursing so far.  Continue to monitor mood.       Orders written by provider at facility  1.  Change K+ to a BMP on Monday  2.  Add CBC to Monday's lab due to ischemic colitis.    Total time spent with patient visit at the skilled nursing facility was 25 min including patient visit and review of past records. Greater than 50% of total time spent with counseling and coordinating care due to reviewing chart and discussion with nursing    Electronically signed by:  ELIAN Guardado CNP

## 2019-05-07 NOTE — PROGRESS NOTES
Received a request to submit a DTR for the termination of PCA, incontinence supplies, SNV, lifeline and EW. Documentation completed and faxed to the health plan. Care Coordinator aware.    Lakeshia Winkler RN  Utilization   [facility]  [phone]

## 2019-05-08 NOTE — PROGRESS NOTES
[place] [facility]  PRIMARY CARE PROVIDER AND CLINIC:  Rosalino Melton MD, [address] / [facility] [address]  Chief Complaint   Patient presents with     Hospital F/U     [place] Medical Record Number:  [id number]  Place of Service where encounter took place:  [facility] [place] ([facility]) [[id number]]    Derek Aragon  is a 78 year old  (1940), admitted to the above facility from  [facility]. Hospital stay 4/1/19 through 5/2/19..  Admitted to this facility for  rehab, medical management and nursing care.    HPI:    HPI information obtained from: facility staff, patient report, [place] Epic chart review and GNP.   Brief Summary of Hospital Course:   - Pt with PMH notable for multiple comorbidities admitted to the above hospital with septic shock 2/2 ischemic colitis (descending colon). ASA stopped.   - developed a-fib with aberrant conduction, Zio patch applied for monitoring.     Updates on Status Since Skilled nursing Admission:   - continued to have loose stool, c-diff negative.   - Pt seen and examined today, denies abdominal pain, but has watery stool, denies n/v. appetite is ok.   - denies chest pain, palpitation, cough or wheezing.   - reports started rehab program and making a good progress.   ============================================================  CODE STATUS/ADVANCE DIRECTIVES DISCUSSION:   DNR only    ALLERGIES: No known drug allergies  PAST MEDICAL HISTORY:  has a past medical history of Anxiety state, unspecified, Diabetic eye exam (H) (10/10/12), Impotence of organic origin, Obesity, morbid, BMI 40.0-49.9 (H) (10/27/2015), Obesity, unspecified, Pure hypercholesterolemia, Sleep disturbance, unspecified, Type II or unspecified type diabetes mellitus without mention of complication, not stated as uncontrolled, Unspecified cerebral artery occlusion with cerebral infarction, and Unspecified essential hypertension.  PAST SURGICAL HISTORY:   has a past  surgical history that includes REMOVAL GALLBLADDER; ENT surgery; Colonoscopy (12/21/2012); and OPEN RX FEMUR FX+INTRAMED EDE (Left).  FAMILY HISTORY: family history includes Cardiovascular in his mother; Cerebrovascular Disease in his father; Diabetes in his mother; Lipids in his sister; Obesity in his maternal grandmother and mother; Psychotic Disorder in his maternal grandfather and maternal grandmother.  SOCIAL HISTORY:   reports that he quit smoking about 34 years ago. His smoking use included cigarettes. He has a 20.00 pack-year smoking history. He has never used smokeless tobacco. He reports that he does not drink alcohol or use drugs.    Post Discharge Medication Reconciliation Status: discharge medications reconciled and changed, per note/orders (see AVS)  Current Outpatient Medications   Medication Sig Dispense Refill     albuterol (2.5 MG/3ML) 0.083% nebulizer solution NEBULIZE 1 VIAL BY MOUTH EV NANCY 6 HOURS AS NEEDED FOR W HEEZING / SHORTNESS OF DREW TH /DYSPNEA 90 mL 3     DULoxetine (CYMBALTA) 30 MG EC capsule TAKE 1 CAPSULE BY MOUTH ONCE  A  capsule 1     furosemide (LASIX) 20 MG tablet Take 1 tablet (20 mg) by mouth daily 90 tablet 3     hydrocortisone 2.5 % cream Apply topically 2 times daily       hydrocortisone 2.5 % cream Apply topically 2 times daily as needed for rash or itching       insulin aspart (NOVOLOG PEN) 100 UNIT/ML pen Inject 1-6 Units Subcutaneous 3 times daily (with meals) For  - 189 give 1 unit.  For  - 239 give 2 units.  For  - 289 give 3 units.  For  - 339 give 4 units.  For  - 399 give 5 units.  For  or higher give 6 units.       insulin aspart (NOVOLOG PEN) 100 UNIT/ML pen Inject 5 Units Subcutaneous 3 times daily (with meals)       LANTUS SOLOSTAR 100 UNIT/ML soln Inject 15 Units Subcutaneous every morning 45 mL 5     nystatin (MYCOSTATIN) 836560 UNIT/GM external cream Apply topically 2 times daily       ORDER FOR DME, SET TO LOCAL  "PRINT, Equipment being ordered: CPAP at previous home settings       potassium chloride ER (K-DUR/KLOR-CON M) 20 MEQ CR tablet Take 2 tablets (40 mEq) by mouth 2 times daily       rOPINIRole (REQUIP) 0.25 MG tablet Take 1 tablet (0.25 mg) by mouth nightly as needed (restless legs) 90 tablet 1     simvastatin (ZOCOR) 40 MG tablet TAKE 1 TABLET (40 MG) BY MOUTH AT BEDTIME 90 tablet 2     blood glucose monitoring (MICHELLE MICROLET) lancets Use to test blood sugar 4 times daily or as directed. 3 Box 3     blood glucose monitoring (NO BRAND SPECIFIED) test strip Use to test blood sugar daily or as directed. 100 strip 1     NOVOFINE 30 30G X 8 MM insulin pen needle USE WITH INSULIN PEN TWICE A  each 3     order for DME Equipment ordered: RESMED Auto PAP Mask type: Full face  Settings: 5-10 CM H2O       ROS:  10 point ROS of systems including Constitutional, Eyes, Respiratory, Cardiovascular, Gastroenterology, Genitourinary, Integumentary, Musculoskeletal, Psychiatric were all negative except for pertinent positives noted in my HPI.    Vitals:  /74   Temp 96.3  F (35.7  C)   Resp 20   Ht 1.753 m (5' 9\")   Wt 125.9 kg (277 lb 8 oz)   SpO2 95%   BMI 40.98 kg/m    Exam:  GENERAL APPEARANCE:  in no distress, cooperative, obese  ENT:  [place]. mouth and posterior oropharynx normal, moist mucous membranes, oral mucosa moist, no lesion noted.   EYES:  EOMI, Pupil rounded and equal.  RESP:  lungs clear to auscultation   CV:  S1S2 audible, regular HR, no murmur appreciated.   ABDOMEN:  soft, NT/ND, BS audible. no mass appreciated on palpation.   M/S:   no joint deformity noted on observation. 2+ pitting edema in legs.   SKIN:  No rash.   NEURO:   No NFD appreciated on observation.   PSYCH:  normal insight, judgement and memory, affect and mood normal    Lab/Diagnostic data: Reviewed in the chart and EHR.      ASSESSMENT/PLAN:  Ischemic colitis (H)  - ongoing diarrhea, c-diff negative, continue to monitor, follow up " with GI.     Paroxymal atrial fibrillation:  - CVR, follow on Zio patch result.     Type 2 diabetes mellitus with diabetic neuropathy, with long-term current use of insulin (H)   A1C 6.2 04/21/2019   - over controleld. Keep level b/w 7-8% (limited life expectancy).   - discontinue SSI, accu check for 5 days.     Obesity, morbid, BMI 40.0-49.9 (H)  - Body mass index is 40.98 kg/m .  .     MIO: on cpap, stable.   Essential hypertension with goal blood pressure less than 140/90  - controlled.     Physical Deconditioning:  - started rehab program, making a progress, continue until desired goal is achieved.   - hx of CVA with left hemiparesis.     Adjustment disorder with depressed mood: stable.       ORDERS:  transcribed by : Eugenie Domínguez  1. discontinue Insulin Aspart Sliding Scale  2. Ace check pre-meal x5 days  - - See above, otherwise, continue the rest of the current POC.       Electronically signed by:  Ghada Johnson MD

## 2019-05-09 NOTE — LETTER
5/9/2019        RE: Derek Aragon  [address]  [facility] [address]        [place] [facility]  PRIMARY CARE PROVIDER AND CLINIC:  Rosalino Melton MD, [address]  / [facility] [address]  Chief Complaint   Patient presents with     Hospital F/U     [place] Medical Record Number:  [id number]  Place of Service where encounter took place:  [facility] [place] ([facility]) [[id number]]    Derek Argaon  is a 78 year old  (1940), admitted to the above facility from  [facility]. Hospital stay 4/1/19 through 5/2/19..  Admitted to this facility for  rehab, medical management and nursing care.    HPI:    HPI information obtained from: facility staff, patient report, [facility] chart review and GNP.   Brief Summary of Hospital Course:   - Pt with PMH notable for multiple comorbidities admitted to the above hospital with septic shock 2/2 ischemic colitis (descending colon). ASA stopped.   - developed a-fib with aberrant conduction, Zio patch applied for monitoring.     Updates on Status Since Skilled nursing Admission:   - continued to have loose stool, c-diff negative.   - Pt seen and examined today, denies abdominal pain, but has watery stool, denies n/v. appetite is ok.   - denies chest pain, palpitation, cough or wheezing.   - reports started rehab program and making a good progress.   ============================================================  CODE STATUS/ADVANCE DIRECTIVES DISCUSSION:   DNR only    ALLERGIES: No known drug allergies  PAST MEDICAL HISTORY:  has a past medical history of Anxiety state, unspecified, Diabetic eye exam (H) (10/10/12), Impotence of organic origin, Obesity, morbid, BMI 40.0-49.9 (H) (10/27/2015), Obesity, unspecified, Pure hypercholesterolemia, Sleep disturbance, unspecified, Type II or unspecified type diabetes mellitus without mention of complication, not stated as uncontrolled, Unspecified cerebral artery occlusion with cerebral infarction,  and Unspecified essential hypertension.  PAST SURGICAL HISTORY:   has a past surgical history that includes REMOVAL GALLBLADDER; ENT surgery; Colonoscopy (12/21/2012); and OPEN RX FEMUR FX+INTRAMED EDE (Left).  FAMILY HISTORY: family history includes Cardiovascular in his mother; Cerebrovascular Disease in his father; Diabetes in his mother; Lipids in his sister; Obesity in his maternal grandmother and mother; Psychotic Disorder in his maternal grandfather and maternal grandmother.  SOCIAL HISTORY:   reports that he quit smoking about 34 years ago. His smoking use included cigarettes. He has a 20.00 pack-year smoking history. He has never used smokeless tobacco. He reports that he does not drink alcohol or use drugs.    Post Discharge Medication Reconciliation Status: discharge medications reconciled and changed, per note/orders (see AVS)  Current Outpatient Medications   Medication Sig Dispense Refill     albuterol (2.5 MG/3ML) 0.083% nebulizer solution NEBULIZE 1 VIAL BY MOUTH EV NANCY 6 HOURS AS NEEDED FOR W HEEZING / SHORTNESS OF DREW TH /DYSPNEA 90 mL 3     DULoxetine (CYMBALTA) 30 MG EC capsule TAKE 1 CAPSULE BY MOUTH ONCE  A  capsule 1     furosemide (LASIX) 20 MG tablet Take 1 tablet (20 mg) by mouth daily 90 tablet 3     hydrocortisone 2.5 % cream Apply topically 2 times daily       hydrocortisone 2.5 % cream Apply topically 2 times daily as needed for rash or itching       insulin aspart (NOVOLOG PEN) 100 UNIT/ML pen Inject 1-6 Units Subcutaneous 3 times daily (with meals) For  - 189 give 1 unit.  For  - 239 give 2 units.  For  - 289 give 3 units.  For  - 339 give 4 units.  For  - 399 give 5 units.  For  or higher give 6 units.       insulin aspart (NOVOLOG PEN) 100 UNIT/ML pen Inject 5 Units Subcutaneous 3 times daily (with meals)       LANTUS SOLOSTAR 100 UNIT/ML soln Inject 15 Units Subcutaneous every morning 45 mL 5     nystatin (MYCOSTATIN) [id number] UNIT/GM  "external cream Apply topically 2 times daily       ORDER FOR DME, SET TO LOCAL PRINT, Equipment being ordered: CPAP at previous home settings       potassium chloride ER (K-DUR/KLOR-CON M) 20 MEQ CR tablet Take 2 tablets (40 mEq) by mouth 2 times daily       rOPINIRole (REQUIP) 0.25 MG tablet Take 1 tablet (0.25 mg) by mouth nightly as needed (restless legs) 90 tablet 1     simvastatin (ZOCOR) 40 MG tablet TAKE 1 TABLET (40 MG) BY MOUTH AT BEDTIME 90 tablet 2     blood glucose monitoring ([employer] Management ServicesET) lancets Use to test blood sugar 4 times daily or as directed. 3 Box 3     blood glucose monitoring (NO BRAND SPECIFIED) test strip Use to test blood sugar daily or as directed. 100 strip 1     NOVOFINE 30 30G X 8 MM insulin pen needle USE WITH INSULIN PEN TWICE A  each 3     order for DME Equipment ordered: RESMED Auto PAP Mask type: Full face  Settings: 5-10 CM H2O       ROS:  10 point ROS of systems including Constitutional, Eyes, Respiratory, Cardiovascular, Gastroenterology, Genitourinary, Integumentary, Musculoskeletal, Psychiatric were all negative except for pertinent positives noted in my HPI.    Vitals:  /74   Temp 96.3  F (35.7  C)   Resp 20   Ht 1.753 m (5' 9\")   Wt 125.9 kg (277 lb 8 oz)   SpO2 95%   BMI 40.98 kg/m     Exam:  GENERAL APPEARANCE:  in no distress, cooperative, obese  ENT:  [place]. mouth and posterior oropharynx normal, moist mucous membranes, oral mucosa moist, no lesion noted.   EYES:  EOMI, Pupil rounded and equal.  RESP:  lungs clear to auscultation   CV:  S1S2 audible, regular HR, no murmur appreciated.   ABDOMEN:  soft, NT/ND, BS audible. no mass appreciated on palpation.   M/S:   no joint deformity noted on observation. 2+ pitting edema in legs.   SKIN:  No rash.   NEURO:   No NFD appreciated on observation.   PSYCH:  normal insight, judgement and memory, affect and mood normal    Lab/Diagnostic data: Reviewed in the chart and EHR.      ASSESSMENT/PLAN:  Ischemic " colitis (H)  - ongoing diarrhea, c-diff negative, continue to monitor, follow up with GI.     Paroxymal atrial fibrillation:  - CVR, follow on Zio patch result.     Type 2 diabetes mellitus with diabetic neuropathy, with long-term current use of insulin (H)   A1C 6.2 04/21/2019   - over controleld. Keep level b/w 7-8% (limited life expectancy).   - discontinue SSI, accu check for 5 days.     Obesity, morbid, BMI 40.0-49.9 (H)  - Body mass index is 40.98 kg/m .  .     MIO: on cpap, stable.   Essential hypertension with goal blood pressure less than 140/90  - controlled.     Physical Deconditioning:  - started rehab program, making a progress, continue until desired goal is achieved.   - hx of CVA with left hemiparesis.     Adjustment disorder with depressed mood: stable.       ORDERS:  transcribed by : Eugenie Domínguez  1. discontinue Insulin Aspart Sliding Scale  2. Ace check pre-meal x5 days  - - See above, otherwise, continue the rest of the current POC.       Electronically signed by:  Ghada Johnson MD                         Sincerely,        Ghada Johnson MD

## 2019-05-15 NOTE — LETTER
5/15/2019        RE: Derek Aragon  [address]  [facility] [address]        [place] [facility]  [place] Medical Record Number:  [id number]  Place of Service where encounter took place:  No question data found.  Chief Complaint   Patient presents with     Nursing Home Acute       HPI:    Derek Aragon  is a 78 year old (1940), who is being seen today for an episodic care visit.  HPI information obtained from: facility chart records, facility staff, patient report and [facility] chart review. Today's concern is:    1. Type 2 diabetes mellitus with complication, with long-term current use of insulin (H)    2. Failure to thrive in adult    3. Aphasia as late effect of stroke    4. History of CVA (cerebrovascular accident) wit left hemiparesis      - staff updating this NP on Derek's status has he continues to show decline and not eating well.  Currently on insulin and looking to see if adjustments need to be made.    Derek came here due to colitis and attends therapies but spouse notices weight loss, no interest in eating and feels he is given up on life.  Staff have been talking with spouse ongoing.      Past Medical and Surgical History reviewed in Epic today.    MEDICATIONS:  Current Outpatient Medications   Medication Sig Dispense Refill     albuterol (2.5 MG/3ML) 0.083% nebulizer solution NEBULIZE 1 VIAL BY MOUTH EV NANCY 6 HOURS AS NEEDED FOR W HEEZING / SHORTNESS OF DREW TH /DYSPNEA 90 mL 3     DULoxetine (CYMBALTA) 30 MG EC capsule TAKE 1 CAPSULE BY MOUTH ONCE  A  capsule 1     furosemide (LASIX) 20 MG tablet Take 1 tablet (20 mg) by mouth daily 90 tablet 3     hydrocortisone 2.5 % cream Apply topically 2 times daily       hydrocortisone 2.5 % cream Apply topically 2 times daily as needed for rash or itching       LANTUS SOLOSTAR 100 UNIT/ML soln Inject 15 Units Subcutaneous every morning 45 mL 5     nystatin (MYCOSTATIN) 840834 UNIT/GM external cream Apply topically 2 times daily    "    potassium chloride ER (K-DUR/KLOR-CON M) 20 MEQ CR tablet Take 2 tablets (40 mEq) by mouth 2 times daily       rOPINIRole (REQUIP) 0.25 MG tablet Take 1 tablet (0.25 mg) by mouth nightly as needed (restless legs) 90 tablet 1     simvastatin (ZOCOR) 40 MG tablet TAKE 1 TABLET (40 MG) BY MOUTH AT BEDTIME 90 tablet 2     blood glucose monitoring (MICHELLE MICROLET) lancets Use to test blood sugar 4 times daily or as directed. 3 Box 3     blood glucose monitoring (NO BRAND SPECIFIED) test strip Use to test blood sugar daily or as directed. 100 strip 1     insulin aspart (NOVOLOG PEN) 100 UNIT/ML pen Inject 1-6 Units Subcutaneous 3 times daily (with meals) For  - 189 give 1 unit.  For  - 239 give 2 units.  For  - 289 give 3 units.  For  - 339 give 4 units.  For  - 399 give 5 units.  For  or higher give 6 units.       insulin aspart (NOVOLOG PEN) 100 UNIT/ML pen Inject 5 Units Subcutaneous 3 times daily (with meals)       NOVOFINE 30 30G X 8 MM insulin pen needle USE WITH INSULIN PEN TWICE A  each 3     ORDER FOR DME, SET TO LOCAL PRINT, Equipment being ordered: CPAP at previous home settings       order for DME Equipment ordered: RESMED Auto PAP Mask type: Full face  Settings: 5-10 CM H2O         REVIEW OF SYSTEMS:  Limited secondary to aphasia impairment but today pt reports no pain upon assessment    Objective:  /83   Pulse 84   Temp 96.8  F (36  C)   Resp 24   Ht 1.753 m (5' 9\")   Wt 116.8 kg (257 lb 8 oz)   SpO2 90%   BMI 38.03 kg/m     Exam:  Up in wheelchair at dining room table.  Just looking straight ahead.    Obese abdomen.  No oxygen used.  No SOB noted.     Blood sugars TID  7am = 's  12N = 110-190's  5pm = 's    Labs:   Component      Latest Ref Rng & Units 5/6/2019   Potassium      3.4 - 5.3 mmol/L 3.6       ASSESSMENT/PLAN:  Type 2 diabetes mellitus with complication, with long-term current use of insulin (H)  - currently on Lantus 15 " units in AM.  Will lower this to 12 units in AM and then discontinue the Novolog 5 units with meals.  No sliding scale at this time.  - LANTUS SOLOSTAR 100 UNIT/ML soln; Inject 12 Units Subcutaneous every morning    Failure to thrive in adult  Was 196 on 5/3/19 and now 257 as of yesterday.  Spouse brings in his favorites but he does not even want to eat that.  Spouse has things to think about for future.  Emotionally hard for her as she was his care giver for some time.    Aphasia as late effect of stroke  History of CVA (cerebrovascular accident) wit left hemiparesis  - for now is in therapies.  Staff assist with cares and mobility.  Continue to assess pain and provide a safe environment.      Orders written by provider at facility and transcribed by : Yumiko Schaefer MA  1.  Discontinue Novolog.  2.  Decrease Lantus 12 units subcutaneous every day.  Dx: DM      Electronically signed by:  ELIAN Guardado CNP               Sincerely,        ELIAN Guardado CNP

## 2019-05-16 NOTE — PROGRESS NOTES
[place] [facility]  [place] Medical Record Number:  [id number]  Place of Service where encounter took place:  No question data found.  Chief Complaint   Patient presents with     Nursing Home Acute       HPI:    Derek Aragon  is a 78 year old (1940), who is being seen today for an episodic care visit.  HPI information obtained from: facility chart records, facility staff, patient report and [facility] chart review. Today's concern is:    1. Type 2 diabetes mellitus with complication, with long-term current use of insulin (H)    2. Failure to thrive in adult    3. Aphasia as late effect of stroke    4. History of CVA (cerebrovascular accident) wit left hemiparesis      - staff updating this NP on Derek's status has he continues to show decline and not eating well.  Currently on insulin and looking to see if adjustments need to be made.    Derek came here due to colitis and attends therapies but spouse notices weight loss, no interest in eating and feels he is given up on life.  Staff have been talking with spouse ongoing.      Past Medical and Surgical History reviewed in Epic today.    MEDICATIONS:  Current Outpatient Medications   Medication Sig Dispense Refill     albuterol (2.5 MG/3ML) 0.083% nebulizer solution NEBULIZE 1 VIAL BY MOUTH EV NANCY 6 HOURS AS NEEDED FOR W HEEZING / SHORTNESS OF DREW TH /DYSPNEA 90 mL 3     DULoxetine (CYMBALTA) 30 MG EC capsule TAKE 1 CAPSULE BY MOUTH ONCE  A  capsule 1     furosemide (LASIX) 20 MG tablet Take 1 tablet (20 mg) by mouth daily 90 tablet 3     hydrocortisone 2.5 % cream Apply topically 2 times daily       hydrocortisone 2.5 % cream Apply topically 2 times daily as needed for rash or itching       LANTUS SOLOSTAR 100 UNIT/ML soln Inject 15 Units Subcutaneous every morning 45 mL 5     nystatin (MYCOSTATIN) 860792 UNIT/GM external cream Apply topically 2 times daily       potassium chloride ER (K-DUR/KLOR-CON M) 20 MEQ CR tablet Take 2 tablets (40  "mEq) by mouth 2 times daily       rOPINIRole (REQUIP) 0.25 MG tablet Take 1 tablet (0.25 mg) by mouth nightly as needed (restless legs) 90 tablet 1     simvastatin (ZOCOR) 40 MG tablet TAKE 1 TABLET (40 MG) BY MOUTH AT BEDTIME 90 tablet 2     blood glucose monitoring (MICHELLE MICROLET) lancets Use to test blood sugar 4 times daily or as directed. 3 Box 3     blood glucose monitoring (NO BRAND SPECIFIED) test strip Use to test blood sugar daily or as directed. 100 strip 1     insulin aspart (NOVOLOG PEN) 100 UNIT/ML pen Inject 1-6 Units Subcutaneous 3 times daily (with meals) For  - 189 give 1 unit.  For  - 239 give 2 units.  For  - 289 give 3 units.  For  - 339 give 4 units.  For  - 399 give 5 units.  For  or higher give 6 units.       insulin aspart (NOVOLOG PEN) 100 UNIT/ML pen Inject 5 Units Subcutaneous 3 times daily (with meals)       NOVOFINE 30 30G X 8 MM insulin pen needle USE WITH INSULIN PEN TWICE A  each 3     ORDER FOR DME, SET TO LOCAL PRINT, Equipment being ordered: CPAP at previous home settings       order for DME Equipment ordered: RESMED Auto PAP Mask type: Full face  Settings: 5-10 CM H2O         REVIEW OF SYSTEMS:  Limited secondary to aphasia impairment but today pt reports no pain upon assessment    Objective:  /83   Pulse 84   Temp 96.8  F (36  C)   Resp 24   Ht 1.753 m (5' 9\")   Wt 116.8 kg (257 lb 8 oz)   SpO2 90%   BMI 38.03 kg/m    Exam:  Up in wheelchair at dining room table.  Just looking straight ahead.    Obese abdomen.  No oxygen used.  No SOB noted.     Blood sugars TID  7am = 's  12N = 110-190's  5pm = 's    Labs:   Component      Latest Ref Rng & Units 5/6/2019   Potassium      3.4 - 5.3 mmol/L 3.6       ASSESSMENT/PLAN:  Type 2 diabetes mellitus with complication, with long-term current use of insulin (H)  - currently on Lantus 15 units in AM.  Will lower this to 12 units in AM and then discontinue the Novolog 5 " units with meals.  No sliding scale at this time.  - LANTUS SOLOSTAR 100 UNIT/ML soln; Inject 12 Units Subcutaneous every morning    Failure to thrive in adult  Was 196 on 5/3/19 and now 257 as of yesterday.  Spouse brings in his favorites but he does not even want to eat that.  Spouse has things to think about for future.  Emotionally hard for her as she was his care giver for some time.    Aphasia as late effect of stroke  History of CVA (cerebrovascular accident) wit left hemiparesis  - for now is in therapies.  Staff assist with cares and mobility.  Continue to assess pain and provide a safe environment.      Orders written by provider at facility and transcribed by : Yumiko Schaefer MA  1.  Discontinue Novolog.  2.  Decrease Lantus 12 units subcutaneous every day.  Dx: DM      Electronically signed by:  ELIAN Guardado CNP

## 2019-05-21 NOTE — PROGRESS NOTES
[facility] Care Coordination Contact    cc attempted to contact Mattie, members wife, to provide emotional support with all of the recent POC changes. VM left.    Jossy Cortes RN-[facility]   [phone]

## 2019-05-21 NOTE — PROGRESS NOTES
We received your order for [facility], if pt admits we are requesting orders as below. Admit nurse will contact you for on going orders if pt admitted.     OK to admit to hospice. OK for hospice orders. OK for current meds and treatments.  MSW eval and treat and admit to hospice by hospice consult.    Will you continue to follow as attending while pt is on hospice?    Thank you for your assistance,     Maritza Martinez RN [facility]  Hospice Day Triage RN/Referral Navigator    [phone]  [email or web address]

## 2019-05-22 NOTE — TELEPHONE ENCOUNTER
Derek is in the nursing home and is being placed on hospice care today. Wife was leaving for appointment with them when I called.    She would like to talk with Dr Melton about his opinion on what led up to these events and why Derek is not getting better. She said that she hasn't gotten any deffinative answers and is a little overwhelmed with all that has happened to this point.    She would like a call from Dr Melton when he returns to clinic.    Debbie CUELLOO/

## 2019-05-22 NOTE — TELEPHONE ENCOUNTER
[employer] is calling with urgent ziopack results. Patient wore the monitor for 14 days. He was in A Fib 5% of the time. He had a rapid A fib episode where his HR was 226 bpm which lasted for 1 minute. She will be faxing over the report.     Will route to PCP and Dr. Sharma (DP) to review.     Maty Reyna, LEANNEN, RN  [facility]

## 2019-05-22 NOTE — TELEPHONE ENCOUNTER
The patient has not been seen by his provider since October.  I believe we should set him up with his primary care provider for their earliest mutual convenience.  If the patient has recurrence of arrhythmia or cardiac symptoms in the interim, he should present to the emergency department immediately.    Zachray

## 2019-05-23 NOTE — TELEPHONE ENCOUNTER
Spoke with Evelin, patient was just admitted to hospice yesterday with DNR/DNI and report was sent prior to that all being completed. Physician given results of Ziopatch here at clinic as well.    Debbie Peña XRO/

## 2019-05-23 NOTE — TELEPHONE ENCOUNTER
A ziopatch report was received in the Clinic today, sent per Dr Rosalino Ellington.    Abnormal tracings with AF/RVR/ NSVT..  Since receiving the report, writer called Dr Melton office- Leticia reports that since this was sent, the pt is now in Hospice/DNI/DNR.  There is no plan to proceed further. [email or web address]

## 2019-05-23 NOTE — PROGRESS NOTES
[facility] Care Coordination Contact    Care Coordinator started the discharge process for 5/31/19.     and Lakeshia MITCHELL notified of change to LTC.    Confirmation that Hospice started on 5/22/2019 with  Hospice.    5181 initiated.    Jossy Cortes RN-[facility]   [phone]       5/28/2019  cc notified that member passed away on 5/26/2019.  Discharge completed.     Jossy Cortes RN-[facility]   [phone]

## 2019-05-29 RX ORDER — INSULIN GLARGINE 100 [IU]/ML
12 INJECTION, SOLUTION SUBCUTANEOUS EVERY MORNING
Qty: 45 ML | Refills: 5
Start: 2019-05-29

## 2023-04-14 NOTE — PROGRESS NOTES
Pt attempted to drink po contrast for CT, drank 120 ml and nausea increased.  Pt unable to drink any more.  Radiology staff informed. Pt transferred to Xray cart via mechanical lift and assist of 4 staff.  Escorted to CT by Maddie RAMSAY.  Cardiac continuous monitor in place.  Insulin gtt continues.     Heterosexual

## 2023-05-08 NOTE — TELEPHONE ENCOUNTER
I approve of requested home care orders.    Rosalino Melton    
Ok to continue SN every other week and 2 PRN for community wellness, diabetes, assessment and education       Bettie Cabrera RN  [facility]   [phone]    [facility] utilizes an encounter to take the place of a direct phone call to your office. Please take a moment to review the below request. Please reply or route message to author of this encounter.  Message will act as a verbal OK of orders requested below. Thank you.      
wear glasses, dry eyes